# Patient Record
Sex: MALE | Race: WHITE | Employment: OTHER | URBAN - METROPOLITAN AREA
[De-identification: names, ages, dates, MRNs, and addresses within clinical notes are randomized per-mention and may not be internally consistent; named-entity substitution may affect disease eponyms.]

---

## 2022-11-11 NOTE — PROGRESS NOTES
Patient awake, alert, and oriented.  No complaints of pain; abdomen soft and tender.  Patient passing flatus without difficulty.  Vital signs stable.  Patient tolerated po fluids well.  Dr. Bardales spoke with patient and family member prior to discharge.  Patient and family verbalize understanding of all discharge instructions given.  Patient discharged to home accompanied by family   Place patient label inside box (if no patient label, complete below)  Name:  :    MR#:   Erin Calender / PROCEDURE  I (we), Micheal Morales (Patient Name) authorize Kamille Hadley MD (Provider / Dawn High) and/or such assistants as may be selected by him/her, to perform the following operation/procedure(s): T5-T9 DECOMPRESSION WITH RESECTION OF EPIDURAL LIPOMATOSIS WITH L2-L5 DECOMPRESSION WITH RESECTION OF EPIDURAL LIPOMATOSIS       Note: If unable to obtain consent prior to an emergent procedure, document the emergent reason in the medical record. This procedure has been explained to my (our) satisfaction and included in the explanation was: The intended benefit, nature, and extent of the procedure to be performed; The significant risks involved and the probability of success; Alternative procedures and methods of treatment; The dangers and probable consequences of such alternatives (including no procedure or treatment); The expected consequences of the procedure on my future health; Whether other qualified individuals would be performing important surgical tasks and/or whether  would be present to advise or support the procedure. I (we) understand that there are other risks of infection and other serious complications in the pre-operative/procedural and postoperative/procedural stages of my (our) care. I (we) have asked all of the questions which I (we) thought were important in deciding whether or not to undergo treatment or diagnosis. These questions have been answered to my (our) satisfaction. I (we) understand that no assurance can be given that the procedure will be a success, and no guarantee or warranty of success has been given to me (us).     It has been explained to me (us) that during the course of the operation/procedure, unforeseen conditions may be revealed that necessitate extension of the original procedure(s) or different procedure(s) than those set forth in Paragraph 1. I (we) authorize and request that the above-named physician, his/her assistants or his/her designees, perform procedures as necessary and desirable if deemed to be in my (our) best interest.     Revised 8/2/2021                                                                          Page 1 of 2       I acknowledge that health care personnel may be observing this procedure for the purpose of medical education or other specified purposes as may be necessary as requested and/or approved by my (our) physician. I (we) consent to the disposal by the hospital Pathologist of the removed tissue, parts or organs in accordance with hospital policy. I do ____ do not ____ consent to the use of a local infiltration pain blocking agent that will be used by my provider/surgical provider to help alleviate pain during my procedure. I do ____ do not ____ consent to an emergent blood transfusion in the case of a life-threatening situation that requires blood components to be administered. This consent is valid for 24 hours from the beginning of the procedure. This patient does ____ or does not ____ currently have a DNR status/order. If DNR order is in place, obtain Addendum to the Surgical Consent for ALL Patients with a DNR Order to address evie-operative status for limited intervention or DNR suspension.      I have read and fully understand the above Consent for Operation/Procedure and that all blanks were completed before I signed the consent.   _____________________________       _____________________      ____/____am/pm  Signature of Patient or legal representative      Printed Name / Relationship            Date / Time   ____________________________       _____________________      ____/____am/pm  Witness to Signature                                    Printed Name                    Date / Time    If patient is unable to sign or is a minor, complete the following)  Patient is a minor, ____ years of age, or unable to sign because:   ______________________________________________________________________________________________    If a phone consent is obtained, consent will be documented by using two health care professionals, each affirming that the consenting party has no questions and gives consent for the procedure discussed with the physician/provider.   _____________________          ____________________       _____/_____am/pm   2nd witness to phone consent        Printed name           Date / Time    Informed Consent:  I have provided the explanation described above in section 1 to the patient and/or legal representative.  I have provided the patient and/or legal representative with an opportunity to ask any questions about the proposed operation/procedure.   ___________________________          ____________________         ____/____am/pm  Provider / Proceduralist                            Printed name            Date / Time  Revised 8/2/2021                                                                      Page 2 of 2

## 2022-11-14 ENCOUNTER — ANESTHESIA EVENT (OUTPATIENT)
Dept: OPERATING ROOM | Age: 67
DRG: 519 | End: 2022-11-14
Payer: MEDICARE

## 2022-11-14 RX ORDER — DIMENHYDRINATE 50 MG
TABLET ORAL
Status: ON HOLD | COMMUNITY
End: 2022-11-21 | Stop reason: HOSPADM

## 2022-11-14 RX ORDER — CITALOPRAM 40 MG/1
40 TABLET ORAL DAILY
COMMUNITY

## 2022-11-14 RX ORDER — BISOPROLOL FUMARATE 10 MG/1
10 TABLET, FILM COATED ORAL NIGHTLY
Status: ON HOLD | COMMUNITY
End: 2022-11-24 | Stop reason: HOSPADM

## 2022-11-14 RX ORDER — ASPIRIN 81 MG/1
81 TABLET, CHEWABLE ORAL DAILY
Status: ON HOLD | COMMUNITY
End: 2022-11-21 | Stop reason: HOSPADM

## 2022-11-14 RX ORDER — LOSARTAN POTASSIUM AND HYDROCHLOROTHIAZIDE 25; 100 MG/1; MG/1
1 TABLET ORAL DAILY
Status: ON HOLD | COMMUNITY
End: 2022-11-24 | Stop reason: HOSPADM

## 2022-11-14 RX ORDER — EZETIMIBE 10 MG/1
10 TABLET ORAL DAILY
Status: ON HOLD | COMMUNITY
End: 2022-11-24 | Stop reason: HOSPADM

## 2022-11-14 RX ORDER — ASCORBIC ACID 500 MG
1 TABLET ORAL DAILY
Status: ON HOLD | COMMUNITY
Start: 2010-12-07 | End: 2022-11-21 | Stop reason: HOSPADM

## 2022-11-14 RX ORDER — M-VIT,TX,IRON,MINS/CALC/FOLIC 27MG-0.4MG
1 TABLET ORAL DAILY
Status: ON HOLD | COMMUNITY
End: 2022-11-21 | Stop reason: HOSPADM

## 2022-11-14 RX ORDER — OMEPRAZOLE 20 MG/1
20 CAPSULE, DELAYED RELEASE ORAL DAILY
COMMUNITY

## 2022-11-14 RX ORDER — ROSUVASTATIN CALCIUM 10 MG/1
10 TABLET, COATED ORAL DAILY
COMMUNITY

## 2022-11-14 NOTE — PROGRESS NOTES
[facility] PRE-SURGICAL TESTING INSTRUCTIONS                      PRIOR TO PROCEDURE DATE:    1. PLEASE FOLLOW ANY INSTRUCTIONS GIVEN TO YOU PER YOUR SURGEON. 2. Arrange for someone to drive you home and be with you for the first 24 hours after discharge for your safety after your procedure for which you received sedation. Ensure it is someone we can share information with regarding your discharge. NOTE: At this time ONLY 2 ADULTS may accompany you   One person MUST stay at hospital entire time if outpatient surgery      3. You must contact your surgeon for instructions IF:  You are taking any blood thinners, aspirin, anti-inflammatory or vitamins. There is a change in your physical condition such as a cold, fever, rash, cuts, sores, or any other infection, especially near your surgical site. 4. Do not drink alcohol the day before or day of your procedure. Do not use any recreational marijuana at least 24 hours or street drugs (heroin, cocaine) at minimum 5 days prior to your procedure. 5. A Pre-Surgical History and Physical MUST be completed WITHIN 30 DAYS OR LESS prior to your procedure. by your Physician or an Urgent Care        THE DAY OF YOUR PROCEDURE:  1. Follow instructions for ARRIVAL TIME as DIRECTED BY YOUR SURGEON. 2. Enter the MAIN entrance from [address] and follow the signs to the free Parking [employer] or [employer] (offered free of charge 7 am-5pm). 3. Enter the Main Entrance of the hospital (do not enter from the lower level of the parking garage). Upon entrance, check in with the  at the surgical information desk on your LEFT. Bring your insurance card and photo ID to register      4. DO NOT EAT ANYTHING 8 hours prior to arrival for surgery. You may have up to 8 ounces of water 4 hours prior to your arrival for surgery.    NOTE: ALL Gastric, Bariatric & Bowel surgery patients - you MUST follow your surgeon's instructions regarding eating/ drinking as you will have very specific instructions to follow. If you did not receive these, call your surgeon's office immediately. 5. MEDICATIONS:  Take the following medications with a SMALL sip of water: CELEXA  (BASAL RATE INSULIN PUMP) OMEPRAZOLE  Use your usual dose of inhalers the morning of surgery. BRING your rescue inhaler with you to hospital.   Anesthesia does NOT want you to take insulin the morning of surgery. They will control your blood sugar while you are at the hospital. Please contact your ordering physician for instructions regarding your insulin the night before your procedure. If you have an insulin pump, please keep it set on basal rate. Bariatric patient's call your surgeon if on diabetic medications as some may need to be stopped 1 week prior to surgery    6. Do not swallow additional water when brushing teeth. No gum, candy, mints, or ice chips. Refrain from smoking or at least decrease the amount on day of surgery. 7. Morning of surgery:   Take a shower with an antibacterial soap (i.e., Safeguard or Dial) OR your physician may have instructed you to use Hibiclens. Dress in loose, comfortable clothing appropriate for redressing after your procedure. Do not wear jewelry (including body piercings), make-up (especially NO eye make-up), fingernail polish (NO toenail polish if foot/leg surgery), lotion, powders, or metal hairclips. Do not shave or wax for 72 hours prior to procedure near your operative site. Shaving with a razor can irritate your skin and make it easier to develop an infection. On the day of your procedure, any hair that needs to be removed near the surgical site will be 'clipped' by a healthcare worker using a special clipper designed to avoid skin irritation. 8. Dentures, glasses, or contacts will need to be removed before your procedure. Bring cases for your glasses, contacts, dentures, or hearing aids to protect them while you are in surgery.       9. If you use a CPAP, please bring it with you on the day of your procedure. 10. We recommend that valuable personal belongings such as cash, cell phones, e-tablets, or jewelry, be left at home during your stay. The hospital will not be responsible for valuables that are not secured in the hospital safe. However, if your insurance requires a co-pay, you may want to bring a method of payment, i.e., Check or credit card, if you wish to pay your co-pay the day of surgery. 11. If you are to stay overnight, you may bring a bag with personal items. Please have any large items you may need brought in by your family after your arrival to your hospital room. 12. If you have a Living Will or Durable Power of , please bring a copy on the day of your procedure. How we keep you safe and work to prevent surgical site infections:   1. Health care workers should always check your ID bracelet to verify your name and birth date. You will be asked many times to state your name, date of birth, and allergies. 2. Health care workers should always clean their hands with soap or alcohol gel before providing care to you. It is okay to ask anyone if they cleaned their hands before they touch you. 3. You will be actively involved in verifying the type of procedure you are having and ensuring the correct surgical site. This will be confirmed multiple times prior to your procedure. Do NOT fatou your surgery site UNLESS instructed to by your surgeon. 4. When you are in the operating room, your surgical site will be cleansed with a special soap, and in most cases, you will be given an antibiotic before the surgery begins. What to expect AFTER your procedure? 1. Immediately following your procedure, your will be taken to the PACU for the first phase of your recovery.   Your nurse will help you recover from any potential side effects of anesthesia, such as extreme drowsiness, changes in your vital signs or breathing patterns. Nausea, headache, muscle aches, or sore throat may also occur after anesthesia. Your nurse will help you manage these potential side effects. 2. For comfort and safety, arrange to have someone at home with you for the first 24 hours after discharge. 3. You and your family will be given written instructions about your diet, activity, dressing care, medications, and return visits. 4. Once at home, should issues with nausea, pain, or bleeding occur, or should you notice any signs of infection, you should call your surgeon. 5. Always clean your hands before and after caring for your wound. Do not let your family touch your surgery site without cleaning their hands. 6. Narcotic pain medications can cause significant constipation. You may want to add a stool softener to your postoperative medication schedule or speak to your surgeon on how best to manage this SIDE EFFECT. SPECIAL INSTRUCTIONS BRING CPAP    Thank you for allowing us to care for you. We strive to exceed your expectations in the delivery of care and service provided to you and your family. If you need to contact the [place] [address] staff for any reason, please call us at [phone]    Instructions reviewed with patient during preadmission testing phone interview.   Mary Delacruz RN.11/14/2022 .9:15 AM      ADDITIONAL EDUCATIONAL INFORMATION REVIEWED PER PHONE WITH YOU AND/OR YOUR FAMILY:  Yes Hibiclens® Bathing Instructions   Yes Antibacterial Soap

## 2022-11-15 ENCOUNTER — HOSPITAL ENCOUNTER (INPATIENT)
Age: 67
LOS: 6 days | Discharge: NURSING, REHAB OR LONG-TERM CARE | DRG: 519 | End: 2022-11-21
Attending: NEUROLOGICAL SURGERY | Admitting: NEUROLOGICAL SURGERY
Payer: MEDICARE

## 2022-11-15 ENCOUNTER — APPOINTMENT (OUTPATIENT)
Dept: GENERAL RADIOLOGY | Age: 67
DRG: 519 | End: 2022-11-15
Attending: NEUROLOGICAL SURGERY
Payer: MEDICARE

## 2022-11-15 ENCOUNTER — ANESTHESIA (OUTPATIENT)
Dept: OPERATING ROOM | Age: 67
DRG: 519 | End: 2022-11-15
Payer: MEDICARE

## 2022-11-15 DIAGNOSIS — M48.061 SPINAL STENOSIS OF LUMBAR REGION, UNSPECIFIED WHETHER NEUROGENIC CLAUDICATION PRESENT: ICD-10-CM

## 2022-11-15 DIAGNOSIS — M47.14 THORACIC MYELOPATHY: ICD-10-CM

## 2022-11-15 DIAGNOSIS — M48.04 THORACIC STENOSIS: ICD-10-CM

## 2022-11-15 PROBLEM — M48.062 LUMBAR STENOSIS WITH NEUROGENIC CLAUDICATION: Status: ACTIVE | Noted: 2022-11-15

## 2022-11-15 LAB
ABO/RH: NORMAL
ANTIBODY SCREEN: NORMAL
APTT: 28.1 SEC (ref 23–34.3)
EKG ATRIAL RATE: 61 BPM
EKG DIAGNOSIS: NORMAL
EKG P AXIS: 35 DEGREES
EKG P-R INTERVAL: 182 MS
EKG Q-T INTERVAL: 424 MS
EKG QRS DURATION: 90 MS
EKG QTC CALCULATION (BAZETT): 426 MS
EKG R AXIS: -19 DEGREES
EKG T AXIS: 14 DEGREES
EKG VENTRICULAR RATE: 61 BPM
GLUCOSE BLD-MCNC: 139 MG/DL (ref 70–99)
GLUCOSE BLD-MCNC: 156 MG/DL (ref 70–99)
GLUCOSE BLD-MCNC: 173 MG/DL (ref 70–99)
GLUCOSE BLD-MCNC: 194 MG/DL (ref 70–99)
GLUCOSE BLD-MCNC: 248 MG/DL (ref 70–99)
HCT VFR BLD CALC: 41.2 % (ref 40.5–52.5)
HEMOGLOBIN: 14.6 G/DL (ref 13.5–17.5)
INR BLD: 1.07 (ref 0.87–1.14)
MCH RBC QN AUTO: 33 PG (ref 26–34)
MCHC RBC AUTO-ENTMCNC: 35.5 G/DL (ref 31–36)
MCV RBC AUTO: 92.9 FL (ref 80–100)
PDW BLD-RTO: 13.4 % (ref 12.4–15.4)
PERFORMED ON: ABNORMAL
PLATELET # BLD: 174 K/UL (ref 135–450)
PMV BLD AUTO: 7.8 FL (ref 5–10.5)
PROTHROMBIN TIME: 13.8 SEC (ref 11.7–14.5)
RBC # BLD: 4.44 M/UL (ref 4.2–5.9)
WBC # BLD: 3.9 K/UL (ref 4–11)

## 2022-11-15 PROCEDURE — 6360000002 HC RX W HCPCS: Performed by: NEUROLOGICAL SURGERY

## 2022-11-15 PROCEDURE — 72020 X-RAY EXAM OF SPINE 1 VIEW: CPT

## 2022-11-15 PROCEDURE — 6360000002 HC RX W HCPCS: Performed by: PHYSICIAN ASSISTANT

## 2022-11-15 PROCEDURE — 01NB0ZZ RELEASE LUMBAR NERVE, OPEN APPROACH: ICD-10-PCS | Performed by: NEUROLOGICAL SURGERY

## 2022-11-15 PROCEDURE — 2500000003 HC RX 250 WO HCPCS: Performed by: NEUROLOGICAL SURGERY

## 2022-11-15 PROCEDURE — 3600000004 HC SURGERY LEVEL 4 BASE: Performed by: NEUROLOGICAL SURGERY

## 2022-11-15 PROCEDURE — 2580000003 HC RX 258: Performed by: NURSE PRACTITIONER

## 2022-11-15 PROCEDURE — 2580000003 HC RX 258: Performed by: PHYSICIAN ASSISTANT

## 2022-11-15 PROCEDURE — 51798 US URINE CAPACITY MEASURE: CPT

## 2022-11-15 PROCEDURE — 3700000000 HC ANESTHESIA ATTENDED CARE: Performed by: NEUROLOGICAL SURGERY

## 2022-11-15 PROCEDURE — 85730 THROMBOPLASTIN TIME PARTIAL: CPT

## 2022-11-15 PROCEDURE — 86901 BLOOD TYPING SEROLOGIC RH(D): CPT

## 2022-11-15 PROCEDURE — 7100000001 HC PACU RECOVERY - ADDTL 15 MIN: Performed by: NEUROLOGICAL SURGERY

## 2022-11-15 PROCEDURE — 86900 BLOOD TYPING SEROLOGIC ABO: CPT

## 2022-11-15 PROCEDURE — 1200000000 HC SEMI PRIVATE

## 2022-11-15 PROCEDURE — 93005 ELECTROCARDIOGRAM TRACING: CPT | Performed by: NEUROLOGICAL SURGERY

## 2022-11-15 PROCEDURE — 3209999900 FLUORO FOR SURGICAL PROCEDURES

## 2022-11-15 PROCEDURE — 00NY0ZZ RELEASE LUMBAR SPINAL CORD, OPEN APPROACH: ICD-10-PCS | Performed by: NEUROLOGICAL SURGERY

## 2022-11-15 PROCEDURE — 3600000014 HC SURGERY LEVEL 4 ADDTL 15MIN: Performed by: NEUROLOGICAL SURGERY

## 2022-11-15 PROCEDURE — 6360000002 HC RX W HCPCS: Performed by: ANESTHESIOLOGY

## 2022-11-15 PROCEDURE — 2500000003 HC RX 250 WO HCPCS: Performed by: NURSE ANESTHETIST, CERTIFIED REGISTERED

## 2022-11-15 PROCEDURE — 6370000000 HC RX 637 (ALT 250 FOR IP): Performed by: INTERNAL MEDICINE

## 2022-11-15 PROCEDURE — 6360000002 HC RX W HCPCS: Performed by: NURSE ANESTHETIST, CERTIFIED REGISTERED

## 2022-11-15 PROCEDURE — 2580000003 HC RX 258: Performed by: ANESTHESIOLOGY

## 2022-11-15 PROCEDURE — 86850 RBC ANTIBODY SCREEN: CPT

## 2022-11-15 PROCEDURE — 2580000003 HC RX 258: Performed by: NEUROLOGICAL SURGERY

## 2022-11-15 PROCEDURE — 6370000000 HC RX 637 (ALT 250 FOR IP): Performed by: PHYSICIAN ASSISTANT

## 2022-11-15 PROCEDURE — 6360000002 HC RX W HCPCS: Performed by: NURSE PRACTITIONER

## 2022-11-15 PROCEDURE — 7100000000 HC PACU RECOVERY - FIRST 15 MIN: Performed by: NEUROLOGICAL SURGERY

## 2022-11-15 PROCEDURE — 00NX0ZZ RELEASE THORACIC SPINAL CORD, OPEN APPROACH: ICD-10-PCS | Performed by: NEUROLOGICAL SURGERY

## 2022-11-15 PROCEDURE — 3700000001 HC ADD 15 MINUTES (ANESTHESIA): Performed by: NEUROLOGICAL SURGERY

## 2022-11-15 PROCEDURE — 85610 PROTHROMBIN TIME: CPT

## 2022-11-15 PROCEDURE — 88304 TISSUE EXAM BY PATHOLOGIST: CPT

## 2022-11-15 PROCEDURE — 2720000010 HC SURG SUPPLY STERILE: Performed by: NEUROLOGICAL SURGERY

## 2022-11-15 PROCEDURE — 2709999900 HC NON-CHARGEABLE SUPPLY: Performed by: NEUROLOGICAL SURGERY

## 2022-11-15 PROCEDURE — C9290 INJ, BUPIVACAINE LIPOSOME: HCPCS | Performed by: NEUROLOGICAL SURGERY

## 2022-11-15 PROCEDURE — A4217 STERILE WATER/SALINE, 500 ML: HCPCS | Performed by: NEUROLOGICAL SURGERY

## 2022-11-15 PROCEDURE — 85027 COMPLETE CBC AUTOMATED: CPT

## 2022-11-15 PROCEDURE — 6370000000 HC RX 637 (ALT 250 FOR IP): Performed by: NURSE PRACTITIONER

## 2022-11-15 PROCEDURE — 93010 ELECTROCARDIOGRAM REPORT: CPT | Performed by: INTERNAL MEDICINE

## 2022-11-15 PROCEDURE — 2580000003 HC RX 258: Performed by: NURSE ANESTHETIST, CERTIFIED REGISTERED

## 2022-11-15 RX ORDER — SODIUM CHLORIDE 0.9 % (FLUSH) 0.9 %
5-40 SYRINGE (ML) INJECTION EVERY 12 HOURS SCHEDULED
Status: DISCONTINUED | OUTPATIENT
Start: 2022-11-15 | End: 2022-11-21 | Stop reason: HOSPADM

## 2022-11-15 RX ORDER — SODIUM CHLORIDE, SODIUM LACTATE, POTASSIUM CHLORIDE, CALCIUM CHLORIDE 600; 310; 30; 20 MG/100ML; MG/100ML; MG/100ML; MG/100ML
INJECTION, SOLUTION INTRAVENOUS CONTINUOUS
Status: DISCONTINUED | OUTPATIENT
Start: 2022-11-15 | End: 2022-11-15 | Stop reason: HOSPADM

## 2022-11-15 RX ORDER — LIDOCAINE HYDROCHLORIDE AND EPINEPHRINE 10; 10 MG/ML; UG/ML
INJECTION, SOLUTION INFILTRATION; PERINEURAL PRN
Status: DISCONTINUED | OUTPATIENT
Start: 2022-11-15 | End: 2022-11-15 | Stop reason: HOSPADM

## 2022-11-15 RX ORDER — PROPOFOL 10 MG/ML
INJECTION, EMULSION INTRAVENOUS CONTINUOUS PRN
Status: DISCONTINUED | OUTPATIENT
Start: 2022-11-15 | End: 2022-11-15 | Stop reason: SDUPTHER

## 2022-11-15 RX ORDER — SENNA AND DOCUSATE SODIUM 50; 8.6 MG/1; MG/1
1 TABLET, FILM COATED ORAL 2 TIMES DAILY
Status: DISCONTINUED | OUTPATIENT
Start: 2022-11-15 | End: 2022-11-21 | Stop reason: HOSPADM

## 2022-11-15 RX ORDER — POLYETHYLENE GLYCOL 3350 17 G/17G
17 POWDER, FOR SOLUTION ORAL DAILY
Status: DISCONTINUED | OUTPATIENT
Start: 2022-11-15 | End: 2022-11-21 | Stop reason: HOSPADM

## 2022-11-15 RX ORDER — SODIUM CHLORIDE 0.9 % (FLUSH) 0.9 %
5-40 SYRINGE (ML) INJECTION EVERY 12 HOURS SCHEDULED
Status: DISCONTINUED | OUTPATIENT
Start: 2022-11-15 | End: 2022-11-15 | Stop reason: HOSPADM

## 2022-11-15 RX ORDER — DIPHENHYDRAMINE HYDROCHLORIDE 50 MG/ML
12.5 INJECTION INTRAMUSCULAR; INTRAVENOUS
Status: DISCONTINUED | OUTPATIENT
Start: 2022-11-15 | End: 2022-11-15 | Stop reason: HOSPADM

## 2022-11-15 RX ORDER — ENOXAPARIN SODIUM 100 MG/ML
40 INJECTION SUBCUTANEOUS DAILY
Status: DISCONTINUED | OUTPATIENT
Start: 2022-11-16 | End: 2022-11-21 | Stop reason: HOSPADM

## 2022-11-15 RX ORDER — CLINDAMYCIN PHOSPHATE 900 MG/50ML
900 INJECTION INTRAVENOUS ONCE
Status: COMPLETED | OUTPATIENT
Start: 2022-11-15 | End: 2022-11-15

## 2022-11-15 RX ORDER — EZETIMIBE 10 MG/1
10 TABLET ORAL DAILY
Status: DISCONTINUED | OUTPATIENT
Start: 2022-11-15 | End: 2022-11-21 | Stop reason: HOSPADM

## 2022-11-15 RX ORDER — SODIUM CHLORIDE 0.9 % (FLUSH) 0.9 %
5-40 SYRINGE (ML) INJECTION PRN
Status: DISCONTINUED | OUTPATIENT
Start: 2022-11-15 | End: 2022-11-21 | Stop reason: HOSPADM

## 2022-11-15 RX ORDER — POLYETHYLENE GLYCOL 3350 17 G/17G
17 POWDER, FOR SOLUTION ORAL DAILY PRN
Status: DISCONTINUED | OUTPATIENT
Start: 2022-11-15 | End: 2022-11-21 | Stop reason: HOSPADM

## 2022-11-15 RX ORDER — FENTANYL CITRATE 50 UG/ML
INJECTION, SOLUTION INTRAMUSCULAR; INTRAVENOUS PRN
Status: DISCONTINUED | OUTPATIENT
Start: 2022-11-15 | End: 2022-11-15 | Stop reason: SDUPTHER

## 2022-11-15 RX ORDER — GLYCOPYRROLATE 0.2 MG/ML
INJECTION INTRAMUSCULAR; INTRAVENOUS PRN
Status: DISCONTINUED | OUTPATIENT
Start: 2022-11-15 | End: 2022-11-15 | Stop reason: SDUPTHER

## 2022-11-15 RX ORDER — METHOCARBAMOL 750 MG/1
750 TABLET, FILM COATED ORAL 4 TIMES DAILY PRN
Status: DISCONTINUED | OUTPATIENT
Start: 2022-11-16 | End: 2022-11-15

## 2022-11-15 RX ORDER — ONDANSETRON 4 MG/1
4 TABLET, ORALLY DISINTEGRATING ORAL EVERY 8 HOURS PRN
Status: DISCONTINUED | OUTPATIENT
Start: 2022-11-15 | End: 2022-11-21 | Stop reason: HOSPADM

## 2022-11-15 RX ORDER — EPHEDRINE SULFATE 50 MG/ML
INJECTION INTRAVENOUS PRN
Status: DISCONTINUED | OUTPATIENT
Start: 2022-11-15 | End: 2022-11-15 | Stop reason: SDUPTHER

## 2022-11-15 RX ORDER — DIAZEPAM 5 MG/1
5 TABLET ORAL EVERY 6 HOURS PRN
Status: DISCONTINUED | OUTPATIENT
Start: 2022-11-15 | End: 2022-11-21 | Stop reason: HOSPADM

## 2022-11-15 RX ORDER — METHOCARBAMOL 750 MG/1
750 TABLET, FILM COATED ORAL EVERY 6 HOURS
Status: DISCONTINUED | OUTPATIENT
Start: 2022-11-16 | End: 2022-11-17

## 2022-11-15 RX ORDER — SODIUM CHLORIDE, SODIUM LACTATE, POTASSIUM CHLORIDE, CALCIUM CHLORIDE 600; 310; 30; 20 MG/100ML; MG/100ML; MG/100ML; MG/100ML
INJECTION, SOLUTION INTRAVENOUS CONTINUOUS PRN
Status: DISCONTINUED | OUTPATIENT
Start: 2022-11-15 | End: 2022-11-15 | Stop reason: SDUPTHER

## 2022-11-15 RX ORDER — SUCCINYLCHOLINE/SOD CL,ISO/PF 200MG/10ML
SYRINGE (ML) INTRAVENOUS PRN
Status: DISCONTINUED | OUTPATIENT
Start: 2022-11-15 | End: 2022-11-15 | Stop reason: SDUPTHER

## 2022-11-15 RX ORDER — ROSUVASTATIN CALCIUM 10 MG/1
10 TABLET, COATED ORAL DAILY
Status: DISCONTINUED | OUTPATIENT
Start: 2022-11-15 | End: 2022-11-21 | Stop reason: HOSPADM

## 2022-11-15 RX ORDER — CITALOPRAM 40 MG/1
40 TABLET ORAL DAILY
Status: DISCONTINUED | OUTPATIENT
Start: 2022-11-15 | End: 2022-11-21 | Stop reason: HOSPADM

## 2022-11-15 RX ORDER — ONDANSETRON 2 MG/ML
INJECTION INTRAMUSCULAR; INTRAVENOUS PRN
Status: DISCONTINUED | OUTPATIENT
Start: 2022-11-15 | End: 2022-11-15 | Stop reason: SDUPTHER

## 2022-11-15 RX ORDER — DEXTROSE MONOHYDRATE 100 MG/ML
INJECTION, SOLUTION INTRAVENOUS CONTINUOUS PRN
Status: DISCONTINUED | OUTPATIENT
Start: 2022-11-15 | End: 2022-11-21 | Stop reason: HOSPADM

## 2022-11-15 RX ORDER — ONDANSETRON 2 MG/ML
4 INJECTION INTRAMUSCULAR; INTRAVENOUS EVERY 6 HOURS PRN
Status: DISCONTINUED | OUTPATIENT
Start: 2022-11-15 | End: 2022-11-21 | Stop reason: HOSPADM

## 2022-11-15 RX ORDER — METOPROLOL SUCCINATE 100 MG/1
100 TABLET, EXTENDED RELEASE ORAL DAILY
Status: DISCONTINUED | OUTPATIENT
Start: 2022-11-15 | End: 2022-11-21 | Stop reason: HOSPADM

## 2022-11-15 RX ORDER — SODIUM CHLORIDE 9 MG/ML
INJECTION, SOLUTION INTRAVENOUS PRN
Status: DISCONTINUED | OUTPATIENT
Start: 2022-11-15 | End: 2022-11-15 | Stop reason: HOSPADM

## 2022-11-15 RX ORDER — MEPERIDINE HYDROCHLORIDE 25 MG/ML
12.5 INJECTION INTRAMUSCULAR; INTRAVENOUS; SUBCUTANEOUS AS NEEDED
Status: DISCONTINUED | OUTPATIENT
Start: 2022-11-15 | End: 2022-11-15 | Stop reason: HOSPADM

## 2022-11-15 RX ORDER — SODIUM CHLORIDE 0.9 % (FLUSH) 0.9 %
5-40 SYRINGE (ML) INJECTION PRN
Status: DISCONTINUED | OUTPATIENT
Start: 2022-11-15 | End: 2022-11-15 | Stop reason: HOSPADM

## 2022-11-15 RX ORDER — METHOCARBAMOL 750 MG/1
750 TABLET, FILM COATED ORAL 4 TIMES DAILY
Status: DISCONTINUED | OUTPATIENT
Start: 2022-11-16 | End: 2022-11-15

## 2022-11-15 RX ORDER — LOSARTAN POTASSIUM AND HYDROCHLOROTHIAZIDE 25; 100 MG/1; MG/1
1 TABLET ORAL DAILY
Status: DISCONTINUED | OUTPATIENT
Start: 2022-11-16 | End: 2022-11-21 | Stop reason: HOSPADM

## 2022-11-15 RX ORDER — PROCHLORPERAZINE EDISYLATE 5 MG/ML
5 INJECTION INTRAMUSCULAR; INTRAVENOUS
Status: DISCONTINUED | OUTPATIENT
Start: 2022-11-15 | End: 2022-11-15 | Stop reason: HOSPADM

## 2022-11-15 RX ORDER — SODIUM CHLORIDE, SODIUM LACTATE, POTASSIUM CHLORIDE, CALCIUM CHLORIDE 600; 310; 30; 20 MG/100ML; MG/100ML; MG/100ML; MG/100ML
INJECTION, SOLUTION INTRAVENOUS CONTINUOUS PRN
Status: DISCONTINUED | OUTPATIENT
Start: 2022-11-15 | End: 2022-11-15

## 2022-11-15 RX ORDER — REMIFENTANIL HYDROCHLORIDE 1 MG/ML
INJECTION, POWDER, LYOPHILIZED, FOR SOLUTION INTRAVENOUS CONTINUOUS PRN
Status: DISCONTINUED | OUTPATIENT
Start: 2022-11-15 | End: 2022-11-15 | Stop reason: SDUPTHER

## 2022-11-15 RX ORDER — OXYCODONE HYDROCHLORIDE 5 MG/1
5 TABLET ORAL EVERY 4 HOURS PRN
Status: DISCONTINUED | OUTPATIENT
Start: 2022-11-15 | End: 2022-11-21 | Stop reason: HOSPADM

## 2022-11-15 RX ORDER — CALCIUM CARBONATE 200(500)MG
1000 TABLET,CHEWABLE ORAL 3 TIMES DAILY PRN
Status: DISCONTINUED | OUTPATIENT
Start: 2022-11-15 | End: 2022-11-21 | Stop reason: HOSPADM

## 2022-11-15 RX ORDER — PANTOPRAZOLE SODIUM 40 MG/1
40 TABLET, DELAYED RELEASE ORAL
Status: DISCONTINUED | OUTPATIENT
Start: 2022-11-16 | End: 2022-11-17

## 2022-11-15 RX ORDER — PROPOFOL 10 MG/ML
INJECTION, EMULSION INTRAVENOUS PRN
Status: DISCONTINUED | OUTPATIENT
Start: 2022-11-15 | End: 2022-11-15 | Stop reason: SDUPTHER

## 2022-11-15 RX ORDER — SODIUM CHLORIDE, SODIUM LACTATE, POTASSIUM CHLORIDE, CALCIUM CHLORIDE 600; 310; 30; 20 MG/100ML; MG/100ML; MG/100ML; MG/100ML
INJECTION, SOLUTION INTRAVENOUS CONTINUOUS
Status: DISCONTINUED | OUTPATIENT
Start: 2022-11-15 | End: 2022-11-16

## 2022-11-15 RX ORDER — ONDANSETRON 2 MG/ML
4 INJECTION INTRAMUSCULAR; INTRAVENOUS
Status: DISCONTINUED | OUTPATIENT
Start: 2022-11-15 | End: 2022-11-15 | Stop reason: HOSPADM

## 2022-11-15 RX ORDER — FAMOTIDINE 20 MG/1
20 TABLET, FILM COATED ORAL 2 TIMES DAILY
Status: DISCONTINUED | OUTPATIENT
Start: 2022-11-15 | End: 2022-11-17

## 2022-11-15 RX ORDER — MIDAZOLAM HYDROCHLORIDE 1 MG/ML
INJECTION INTRAMUSCULAR; INTRAVENOUS PRN
Status: DISCONTINUED | OUTPATIENT
Start: 2022-11-15 | End: 2022-11-15 | Stop reason: SDUPTHER

## 2022-11-15 RX ORDER — LIDOCAINE HYDROCHLORIDE 20 MG/ML
INJECTION, SOLUTION INTRAVENOUS PRN
Status: DISCONTINUED | OUTPATIENT
Start: 2022-11-15 | End: 2022-11-15 | Stop reason: SDUPTHER

## 2022-11-15 RX ORDER — MAGNESIUM HYDROXIDE 1200 MG/15ML
LIQUID ORAL CONTINUOUS PRN
Status: DISCONTINUED | OUTPATIENT
Start: 2022-11-15 | End: 2022-11-15 | Stop reason: HOSPADM

## 2022-11-15 RX ORDER — OXYCODONE HYDROCHLORIDE 5 MG/1
10 TABLET ORAL EVERY 4 HOURS PRN
Status: DISCONTINUED | OUTPATIENT
Start: 2022-11-15 | End: 2022-11-21 | Stop reason: HOSPADM

## 2022-11-15 RX ORDER — SODIUM CHLORIDE 9 MG/ML
INJECTION, SOLUTION INTRAVENOUS PRN
Status: DISCONTINUED | OUTPATIENT
Start: 2022-11-15 | End: 2022-11-21 | Stop reason: HOSPADM

## 2022-11-15 RX ORDER — HYDRALAZINE HYDROCHLORIDE 20 MG/ML
10 INJECTION INTRAMUSCULAR; INTRAVENOUS
Status: DISCONTINUED | OUTPATIENT
Start: 2022-11-15 | End: 2022-11-15 | Stop reason: HOSPADM

## 2022-11-15 RX ORDER — DEXTROSE MONOHYDRATE 100 MG/ML
INJECTION, SOLUTION INTRAVENOUS CONTINUOUS PRN
Status: DISCONTINUED | OUTPATIENT
Start: 2022-11-15 | End: 2022-11-15 | Stop reason: SDUPTHER

## 2022-11-15 RX ORDER — SODIUM PHOSPHATE, DIBASIC AND SODIUM PHOSPHATE, MONOBASIC 7; 19 G/133ML; G/133ML
1 ENEMA RECTAL DAILY PRN
Status: DISCONTINUED | OUTPATIENT
Start: 2022-11-15 | End: 2022-11-21 | Stop reason: HOSPADM

## 2022-11-15 RX ORDER — ROCURONIUM BROMIDE 10 MG/ML
INJECTION, SOLUTION INTRAVENOUS PRN
Status: DISCONTINUED | OUTPATIENT
Start: 2022-11-15 | End: 2022-11-15 | Stop reason: SDUPTHER

## 2022-11-15 RX ADMIN — SENNOSIDES AND DOCUSATE SODIUM 1 TABLET: 50; 8.6 TABLET ORAL at 20:09

## 2022-11-15 RX ADMIN — PHENYLEPHRINE HYDROCHLORIDE 100 MCG: 10 INJECTION, SOLUTION INTRAMUSCULAR; INTRAVENOUS; SUBCUTANEOUS at 11:13

## 2022-11-15 RX ADMIN — ROCURONIUM BROMIDE 45 MG: 10 INJECTION INTRAVENOUS at 09:17

## 2022-11-15 RX ADMIN — FENTANYL CITRATE 50 MCG: 50 INJECTION, SOLUTION INTRAMUSCULAR; INTRAVENOUS at 11:59

## 2022-11-15 RX ADMIN — ONDANSETRON 4 MG: 2 INJECTION INTRAMUSCULAR; INTRAVENOUS at 11:35

## 2022-11-15 RX ADMIN — FAMOTIDINE 20 MG: 20 TABLET ORAL at 20:09

## 2022-11-15 RX ADMIN — OXYCODONE 10 MG: 5 TABLET ORAL at 18:22

## 2022-11-15 RX ADMIN — PROPOFOL 120 MCG/KG/MIN: 10 INJECTION, EMULSION INTRAVENOUS at 09:17

## 2022-11-15 RX ADMIN — SUGAMMADEX 200 MG: 100 INJECTION, SOLUTION INTRAVENOUS at 10:02

## 2022-11-15 RX ADMIN — CALCIUM CARBONATE 1000 MG: 500 TABLET, CHEWABLE ORAL at 22:30

## 2022-11-15 RX ADMIN — LIDOCAINE HYDROCHLORIDE 100 MG: 20 INJECTION, SOLUTION INTRAVENOUS at 08:55

## 2022-11-15 RX ADMIN — REMIFENTANIL HYDROCHLORIDE 0.1 MCG/KG/MIN: 1 INJECTION, POWDER, LYOPHILIZED, FOR SOLUTION INTRAVENOUS at 09:17

## 2022-11-15 RX ADMIN — METHOCARBAMOL 1000 MG: 100 INJECTION, SOLUTION INTRAMUSCULAR; INTRAVENOUS at 20:09

## 2022-11-15 RX ADMIN — OXYCODONE 10 MG: 5 TABLET ORAL at 14:19

## 2022-11-15 RX ADMIN — HYDROMORPHONE HYDROCHLORIDE 0.5 MG: 1 INJECTION, SOLUTION INTRAMUSCULAR; INTRAVENOUS; SUBCUTANEOUS at 19:19

## 2022-11-15 RX ADMIN — ROSUVASTATIN CALCIUM 10 MG: 10 TABLET, COATED ORAL at 14:19

## 2022-11-15 RX ADMIN — PHENYLEPHRINE HYDROCHLORIDE 100 MCG: 10 INJECTION, SOLUTION INTRAMUSCULAR; INTRAVENOUS; SUBCUTANEOUS at 11:33

## 2022-11-15 RX ADMIN — HYDROMORPHONE HYDROCHLORIDE 0.5 MG: 1 INJECTION, SOLUTION INTRAMUSCULAR; INTRAVENOUS; SUBCUTANEOUS at 12:22

## 2022-11-15 RX ADMIN — DIAZEPAM 5 MG: 5 TABLET ORAL at 15:05

## 2022-11-15 RX ADMIN — DIAZEPAM 5 MG: 5 TABLET ORAL at 22:30

## 2022-11-15 RX ADMIN — DEXMEDETOMIDINE HYDROCHLORIDE 4 MCG: 100 INJECTION, SOLUTION INTRAVENOUS at 09:00

## 2022-11-15 RX ADMIN — VANCOMYCIN HYDROCHLORIDE 1500 MG: 10 INJECTION, POWDER, LYOPHILIZED, FOR SOLUTION INTRAVENOUS at 09:00

## 2022-11-15 RX ADMIN — METHOCARBAMOL 1000 MG: 100 INJECTION, SOLUTION INTRAMUSCULAR; INTRAVENOUS at 17:18

## 2022-11-15 RX ADMIN — GLYCOPYRROLATE 0.2 MG: 0.2 INJECTION INTRAMUSCULAR; INTRAVENOUS at 09:02

## 2022-11-15 RX ADMIN — METOPROLOL SUCCINATE 100 MG: 100 TABLET, EXTENDED RELEASE ORAL at 14:18

## 2022-11-15 RX ADMIN — MIDAZOLAM HYDROCHLORIDE 2 MG: 2 INJECTION, SOLUTION INTRAMUSCULAR; INTRAVENOUS at 08:47

## 2022-11-15 RX ADMIN — DEXMEDETOMIDINE HYDROCHLORIDE 4 MCG: 100 INJECTION, SOLUTION INTRAVENOUS at 08:51

## 2022-11-15 RX ADMIN — POLYETHYLENE GLYCOL 3350 17 G: 17 POWDER, FOR SOLUTION ORAL at 14:18

## 2022-11-15 RX ADMIN — HYDROMORPHONE HYDROCHLORIDE 0.5 MG: 1 INJECTION, SOLUTION INTRAMUSCULAR; INTRAVENOUS; SUBCUTANEOUS at 23:56

## 2022-11-15 RX ADMIN — SODIUM CHLORIDE, POTASSIUM CHLORIDE, SODIUM LACTATE AND CALCIUM CHLORIDE: 600; 310; 30; 20 INJECTION, SOLUTION INTRAVENOUS at 07:34

## 2022-11-15 RX ADMIN — OXYCODONE 10 MG: 5 TABLET ORAL at 22:30

## 2022-11-15 RX ADMIN — PHENYLEPHRINE HYDROCHLORIDE 100 MCG: 10 INJECTION, SOLUTION INTRAMUSCULAR; INTRAVENOUS; SUBCUTANEOUS at 10:57

## 2022-11-15 RX ADMIN — SODIUM CHLORIDE, POTASSIUM CHLORIDE, SODIUM LACTATE AND CALCIUM CHLORIDE: 600; 310; 30; 20 INJECTION, SOLUTION INTRAVENOUS at 15:00

## 2022-11-15 RX ADMIN — ROCURONIUM BROMIDE 5 MG: 10 INJECTION INTRAVENOUS at 08:55

## 2022-11-15 RX ADMIN — PHENOL 1 SPRAY: 1.5 LIQUID ORAL at 22:30

## 2022-11-15 RX ADMIN — Medication 160 MG: at 08:56

## 2022-11-15 RX ADMIN — HYDROMORPHONE HYDROCHLORIDE 0.5 MG: 1 INJECTION, SOLUTION INTRAMUSCULAR; INTRAVENOUS; SUBCUTANEOUS at 12:43

## 2022-11-15 RX ADMIN — SODIUM CHLORIDE, SODIUM LACTATE, POTASSIUM CHLORIDE, AND CALCIUM CHLORIDE: .6; .31; .03; .02 INJECTION, SOLUTION INTRAVENOUS at 08:52

## 2022-11-15 RX ADMIN — VANCOMYCIN HYDROCHLORIDE 1500 MG: 10 INJECTION, POWDER, LYOPHILIZED, FOR SOLUTION INTRAVENOUS at 20:17

## 2022-11-15 RX ADMIN — SODIUM CHLORIDE, SODIUM LACTATE, POTASSIUM CHLORIDE, AND CALCIUM CHLORIDE: .6; .31; .03; .02 INJECTION, SOLUTION INTRAVENOUS at 09:47

## 2022-11-15 RX ADMIN — SENNOSIDES AND DOCUSATE SODIUM 1 TABLET: 50; 8.6 TABLET ORAL at 14:19

## 2022-11-15 RX ADMIN — EZETIMIBE 10 MG: 10 TABLET ORAL at 15:38

## 2022-11-15 RX ADMIN — PROPOFOL 150 MG: 10 INJECTION, EMULSION INTRAVENOUS at 08:55

## 2022-11-15 RX ADMIN — FENTANYL CITRATE 50 MCG: 50 INJECTION, SOLUTION INTRAMUSCULAR; INTRAVENOUS at 11:40

## 2022-11-15 RX ADMIN — EPHEDRINE SULFATE 10 MG: 50 INJECTION INTRAVENOUS at 09:21

## 2022-11-15 RX ADMIN — CLINDAMYCIN PHOSPHATE 900 MG: 900 INJECTION, SOLUTION INTRAVENOUS at 08:49

## 2022-11-15 RX ADMIN — SODIUM CHLORIDE, PRESERVATIVE FREE 10 ML: 5 INJECTION INTRAVENOUS at 20:09

## 2022-11-15 RX ADMIN — CITALOPRAM 40 MG: 40 TABLET, FILM COATED ORAL at 14:18

## 2022-11-15 RX ADMIN — HYDROMORPHONE HYDROCHLORIDE 0.5 MG: 1 INJECTION, SOLUTION INTRAMUSCULAR; INTRAVENOUS; SUBCUTANEOUS at 16:04

## 2022-11-15 ASSESSMENT — PAIN SCALES - GENERAL
PAINLEVEL_OUTOF10: 10
PAINLEVEL_OUTOF10: 8
PAINLEVEL_OUTOF10: 9
PAINLEVEL_OUTOF10: 7
PAINLEVEL_OUTOF10: 9
PAINLEVEL_OUTOF10: 5

## 2022-11-15 ASSESSMENT — PAIN DESCRIPTION - LOCATION
LOCATION: BACK

## 2022-11-15 ASSESSMENT — PAIN DESCRIPTION - ONSET
ONSET: ON-GOING

## 2022-11-15 ASSESSMENT — PAIN DESCRIPTION - DESCRIPTORS
DESCRIPTORS: ACHING
DESCRIPTORS: ACHING
DESCRIPTORS: ACHING;DISCOMFORT
DESCRIPTORS: ACHING
DESCRIPTORS: ACHING;DISCOMFORT
DESCRIPTORS: ACHING
DESCRIPTORS: BURNING;ACHING
DESCRIPTORS: ACHING;DISCOMFORT

## 2022-11-15 ASSESSMENT — PAIN DESCRIPTION - ORIENTATION
ORIENTATION: UPPER;LOWER
ORIENTATION: LOWER
ORIENTATION: OUTER;UPPER
ORIENTATION: LOWER
ORIENTATION: LOWER

## 2022-11-15 ASSESSMENT — PAIN DESCRIPTION - FREQUENCY
FREQUENCY: CONTINUOUS

## 2022-11-15 ASSESSMENT — LIFESTYLE VARIABLES: SMOKING_STATUS: 0

## 2022-11-15 ASSESSMENT — PAIN - FUNCTIONAL ASSESSMENT
PAIN_FUNCTIONAL_ASSESSMENT: 0-10
PAIN_FUNCTIONAL_ASSESSMENT: PREVENTS OR INTERFERES SOME ACTIVE ACTIVITIES AND ADLS

## 2022-11-15 ASSESSMENT — PAIN DESCRIPTION - PAIN TYPE
TYPE: ACUTE PAIN;SURGICAL PAIN

## 2022-11-15 NOTE — PROGRESS NOTES
Pt alert and oriented. IV started x 2. Blood drawn and sent to lab. MRSA swab sent. Vancomycin and Clindamycin to OR.

## 2022-11-15 NOTE — PROGRESS NOTES
PACU Transfer Note    Vitals:    11/15/22 1300   BP: 122/68   Pulse: 69   Resp: 12   Temp:    SpO2: 100%       In: 2200 [I.V.:1900]  Out: 350 [Drains:50]    Pain assessment:  level of pain (1-10, 10 severe), 5/10 upper/lower back . Report given to Rekha Kellogg RN at bedside. Patient moving all extremities without difficulty. Patient to room #5509 as scheduled.    Pain Level: 5    Report given to Receiving unit RN.    11/15/2022 1:18 PM

## 2022-11-15 NOTE — PROGRESS NOTES
Notified Dr. Chan Braun patient has insulin pump with continual basal rate. Dr. Chan Braun will discuss with patient.

## 2022-11-15 NOTE — BRIEF OP NOTE
Brief Postoperative Note      Patient: Rachel Oro  YOB: 1955  MRN: [id number]    Date of Procedure: 11/15/2022    Pre-Op Diagnosis: Thoracic myelopathy [M47.14] Thoracic stenosis [M48.04] Spinal stenosis of lumbar region, unspecified whether neurogenic claudication present [M48.061]    Post-Op Diagnosis: Same       Procedure(s):  T5-T9 DECOMPRESSION WITH RESECTION OF EPIDURAL LIPOMATOSIS WITH L2-L5 DECOMPRESSION WITH RESECTION OF EPIDURAL LIPOMATOSIS    Surgeon(s):  Shantanu Riley MD    Assistant:  Physician Assistant: SASHA Molina    Anesthesia: General    Estimated Blood Loss (mL): 093     Complications: None    Specimens:   ID Type Source Tests Collected by Time Destination   A : A) THORACIC EPIDURAL LIPOMA Tissue Tissue SURGICAL PATHOLOGY Shantanu Riley MD 11/15/2022 1035    B : B) LUMBAR EPIDURAL LIPOMA Tissue Tissue SURGICAL PATHOLOGY Shantanu Riley MD 11/15/2022 1041        Implants:  * No implants in log *      Drains:   Closed/Suction Drain Left Back Accordion (Active)       Closed/Suction Drain Right Back Accordion (Active)       Findings: Large Epidural Lipomatosis - lipoma at T5-T9 and L2-L5    Electronically signed by Shantanu Riley MD on 11/15/2022 at 11:24 AM

## 2022-11-15 NOTE — PROGRESS NOTES
Pt admitted to [address]. Oriented to room and call light. 4 eyes skin assessment completed with 2nd RN. All fall precautions in place.

## 2022-11-15 NOTE — CONSULTS
Hospital Medicine  Consult History & Physical        Chief Complaint: Neck pain, back pain    Date of Service: Pt seen/examined in consultation on 11/15/21    History Of Present Illness:      61 y.o. male who we are asked to see/evaluate by Nithya Pedroza MD for medical management of diabetes mellitus type 2, hypertension     Patient has a medical history of insulin-dependent DM , hypertension, hyperlipidemia, TIAs. .. He has been having chronic thoracic and lumbar pain with lower extremity weakness and an MRI demonstrated epidural lipomatosis severe spinal stenosis with cord compression from the T5-6 to T9 levels and severe central stenosis at L3-4 and L4-5 with moderate to severe stenosis at L2-3 this is secondary to a combination of epidural lipomatosis and facet arthropathy. Patient failed outpatient conservative treatment and underwent an elective T5-T9--L2-5,laminectomy, medial facetectomy for decompression and foraminotomies,Resection of Epidural Lipoma/lipomatosis  11/15/22. Hospitalist service was consulted for postop medical management    Past Medical History:        Diagnosis Date    Cerebral artery occlusion with cerebral infarction ([address]     TIA's x 4    Diabetes mellitus ([address]     Hyperlipidemia     Hypertension     TIA (transient ischemic attack)        Past Surgical History:        Procedure Laterality Date    BACK SURGERY      LUMBAR X2    CARDIAC SURGERY      OPEN HEART 2006, SEES DR RASHID NOW    CERVICAL FUSION      C 6-7    ELBOW SURGERY Bilateral     TENDON REPAIR    HAND SURGERY      x9    LUMBAR SPINE SURGERY N/A 11/15/2022    T5-T9 DECOMPRESSION WITH RESECTION OF EPIDURAL LIPOMATOSIS WITH L2-L5 DECOMPRESSION WITH RESECTION OF EPIDURAL LIPOMATOSIS performed by Nithya Pedroza MD at [address] [place] [address] Bilateral     ROTATOR CUFF       Medications Prior to Admission:    Prior to Admission medications    Medication Sig Start Date End Date Taking?  Authorizing Provider insulin lispro (HUMALOG) 100 UNIT/ML injection vial Inject into the skin 3 times daily (before meals) Insulin pump   Yes Historical Provider, MD   rosuvastatin (CRESTOR) 10 MG tablet Take 10 mg by mouth daily   Yes Historical Provider, MD   ezetimibe (ZETIA) 10 MG tablet Take 10 mg by mouth daily   Yes Historical Provider, MD   omeprazole (PRILOSEC) 20 MG delayed release capsule Take 20 mg by mouth daily   Yes Historical Provider, MD   aspirin 81 MG chewable tablet Take 81 mg by mouth daily Taking, but had to stop for Sx,  says he can only take the coated ones so he doesn't get ulcer   Yes Historical Provider, MD   Multiple Vitamins-Minerals (THERAPEUTIC MULTIVITAMIN-MINERALS) tablet Take 1 tablet by mouth daily   Yes Historical Provider, MD   losartan-hydroCHLOROthiazide (HYZAAR) 100-25 MG per tablet Take 1 tablet by mouth daily   Yes Historical Provider, MD   Garlic 7518 MG CAPS Take by mouth   Yes Historical Provider, MD   Coenzyme Q10 (CO Q-10) 100 MG CAPS Take by mouth   Yes Historical Provider, MD   bisoprolol (ZEBETA) 10 MG tablet Take 10 mg by mouth at bedtime   Yes Historical Provider, MD   vitamin E 100 units capsule Take 1 capsule by mouth daily 4/25/19  Yes Historical Provider, MD   ascorbic acid (VITAMIN C) 500 MG tablet Take 1 tablet by mouth daily 12/7/10  Yes Historical Provider, MD   citalopram (CELEXA) 40 MG tablet Take 40 mg by mouth daily   Yes Historical Provider, MD   vitamin D3 (CHOLECALCIFEROL) 125 MCG (5000 UT) TABS tablet Take 1 tablet by mouth daily    Historical Provider, MD       Allergies:  Acetaminophen, Atorvastatin, Ciprofloxacin, Cortisone, Gabapentin, Hydrocodone, Liraglutide, Simvastatin, Tetracycline, and Penicillins    Social History:      The patient currently lives     TOBACCO:   reports that he has never smoked. He has never used smokeless tobacco.  ETOH:   has no history on file for alcohol use.       Family History:      Reviewed in detail and negative for DM, CAD, Cancer, CVA. Positive as follows:    History reviewed. No pertinent family history. REVIEW OF SYSTEMS COMPLETED:   Pertinent positives as noted in the HPI. All other systems reviewed and negative. PHYSICAL EXAM PERFORMED:  /70   Pulse 70   Temp 97.4 °F (36.3 °C) (Oral)   Resp 14   Ht 5' 8.5\" (1.74 m)   Wt 217 lb (98.4 kg)   SpO2 99%   BMI 32.51 kg/m²   General appearance: No apparent distress, appears stated age and cooperative. HEENT: Normal cephalic, atraumatic without obvious deformity. Pupils equal, round, and reactive to light. Extra ocular muscles intact. Conjunctivae/corneas clear. Neck: Supple, with full range of motion. No jugular venous distention. Trachea midline. Respiratory:  Normal respiratory effort. Clear to auscultation, bilaterally without Rales/Wheezes/Rhonchi. Cardiovascular: Regular rate and rhythm with normal S1/S2 without murmurs, rubs or gallops. Abdomen: Soft, non-tender, non-distended with normal bowel sounds. Musculoskeletal:  No clubbing, cyanosis or edema bilaterally. Skin: Skin color, texture, turgor normal.  No rashes or lesions. Neurologic:  Neurovascularly intact without any focal sensory/motor deficits. Cranial nerves: II-XII intact, grossly non-focal.  Psychiatric: Alert and oriented, thought content appropriate, normal insight  Capillary Refill: Brisk,3 seconds, normal   Peripheral Pulses: +2 palpable, equal bilaterally     Labs:     Recent Labs     11/15/22  0757   WBC 3.9*   HGB 14.6   HCT 41.2        No results for input(s): NA, K, CL, CO2, BUN, CREATININE, CALCIUM, PHOS in the last 72 hours. Invalid input(s): MAGNES  No results for input(s): AST, ALT, BILIDIR, BILITOT, ALKPHOS in the last 72 hours. Recent Labs     11/15/22  0757   INR 1.07     No results for input(s): Gisele Tellez in the last 72 hours.     Urinalysis:  No results found for: Sonia Furnish, BACTERIA, RBCUA, BLOODU, [address], [address]    Radiology: I have reviewed the radiology reports with the following interpretation:     XR THORACIC SPINE 1 VW   Final Result   1. Intraoperative fluoroscopy during spine surgery as described. Images demonstrate localization of posterior thoracic and lumbar vertebral elements. XR LUMBAR SPINE 1 VW   Final Result   1. Intraoperative fluoroscopy during spine surgery as described. Images demonstrate localization of posterior thoracic and lumbar vertebral elements. FLUORO FOR SURGICAL PROCEDURES   Final Result   1. Intraoperative fluoroscopy during spine surgery as described. Images demonstrate localization of posterior thoracic and lumbar vertebral elements. EKG:  I have reviewed the EKG with the following interpretation:    @ekgread@      ASSESSMENT:    -Severe central stenosis at T5-T9 with Epidural Lipomatosis/lipoma  -Severe central stenosis at L2-3-4-5 with Epidural Lipomatosis/lipoma    S/p  T5-T9--L2-5,   laminectomy, medial facetectomy for decompression and foraminotomies,Resection of Epidural Lipoma/lipomatosis  11/15/22     Continue postop care per neurosurgery-pain management, PT and OT    Insulin-dependent diabetes mellitus type 2-on insulin pump-continue with insulin pump    Essential hypertension-stable-continue metoprolol, losartan/HCTZ    Hyperlipidemia--continue rosuvastatin, ezetemibe    GERD-continue PPI    History of TIAs--continue statin. .  ASA on hold      DVT Prophylaxis: scds  Diet: ADULT DIET;  Regular  Code Status: Full Code        Thank you for the consultation, will follow up as needed    Electronically signed by Riccardo Mojica MD on 11/15/22 at 2:48 PM EST

## 2022-11-15 NOTE — H&P
[address] [facility] Update H & P  Department of General Surgery   Surgical Service   Pre-operative History and Physical  Last H & P within the last 30 days. DIAGNOSIS:   Thoracic myelopathy [M47.14]  Thoracic stenosis [M48.04]  Spinal stenosis of lumbar region, unspecified whether neurogenic claudication present [M48.061]    Procedure(s):  T5-T9 DECOMPRESSION WITH RESECTION OF EPIDURAL LIPOMATOSIS WITH L2-L5 DECOMPRESSION WITH RESECTION OF EPIDURAL LIPOMATOSIS     History obtained from: Patient interview and EHR     HISTORY OF PRESENT ILLNESS:   The patient is a 61 y.o. male with c/o chronic thoracic pain, lumbar pain  bilateral LE pain and weakness in the setting of MRI demonstrated epidural lipomatosis which demonstrates severe spinal stenosis with cord compression from the T5-6 to T9 levels and severe central stenosis at L3-4 and L4-5 with moderate to severe stenosis at L2-3 this is secondary to a combination of epidural lipomatosis and facet arthropathy. Their symptoms have been recalcitrant to conservative treatment and the patient presents today for the above procedure. Illness Screening: Patient denies fever, chills, worsening cough, or close contact with sick individuals. Past Medical History:        Diagnosis Date    Diabetes mellitus ([address]     Hyperlipidemia     Hypertension     TIA (transient ischemic attack)      Past Surgical History:        Procedure Laterality Date    BACK SURGERY      LUMBAR X2    CARDIAC SURGERY      OPEN HEART 2006, SEES DR RASHID NOW    CERVICAL FUSION      C 6-7    ELBOW SURGERY Bilateral     TENDON REPAIR    HAND SURGERY      x9    SHOULDER SURGERY Bilateral     ROTATOR CUFF           Medications Prior to Admission:      Prior to Admission medications    Medication Sig Start Date End Date Taking?  Authorizing Provider   insulin lispro (HUMALOG) 100 UNIT/ML injection vial Inject into the skin 3 times daily (before meals) Insulin pump   Yes Historical Provider, MD   rosuvastatin (CRESTOR) 10 MG tablet Take 10 mg by mouth daily   Yes Historical Provider, MD   ezetimibe (ZETIA) 10 MG tablet Take 10 mg by mouth daily   Yes Historical Provider, MD   omeprazole (PRILOSEC) 20 MG delayed release capsule Take 20 mg by mouth daily   Yes Historical Provider, MD   aspirin 81 MG chewable tablet Take 81 mg by mouth daily   Yes Historical Provider, MD   Multiple Vitamins-Minerals (THERAPEUTIC MULTIVITAMIN-MINERALS) tablet Take 1 tablet by mouth daily   Yes Historical Provider, MD   losartan-hydroCHLOROthiazide (HYZAAR) 100-25 MG per tablet Take 1 tablet by mouth daily   Yes Historical Provider, MD   Garlic 7691 MG CAPS Take by mouth   Yes Historical Provider, MD   Coenzyme Q10 (CO Q-10) 100 MG CAPS Take by mouth   Yes Historical Provider, MD   bisoprolol (ZEBETA) 10 MG tablet Take 10 mg by mouth at bedtime   Yes Historical Provider, MD   vitamin E 100 units capsule Take 1 capsule by mouth daily 4/25/19  Yes Historical Provider, MD   ascorbic acid (VITAMIN C) 500 MG tablet Take 1 tablet by mouth daily 12/7/10  Yes Historical Provider, MD   citalopram (CELEXA) 40 MG tablet Take 40 mg by mouth daily   Yes Historical Provider, MD   vitamin D3 (CHOLECALCIFEROL) 125 MCG (5000 UT) TABS tablet Take 1 tablet by mouth daily    Historical Provider, MD         Allergies:  Acetaminophen, Atorvastatin, Ciprofloxacin, Cortisone, Gabapentin, Hydrocodone, Liraglutide, Simvastatin, Tetracycline, and Penicillins    PHYSICAL EXAM:      /70   Pulse 65   Temp 97.5 °F (36.4 °C) (Temporal)   Resp 14   Ht 5' 8.5\" (1.74 m)   Wt 217 lb (98.4 kg)   SpO2 96%   BMI 32.51 kg/m²      Airway:  Airway patent with no audible stridor    Heart:  Regular rate and rhythm, No murmur noted    Lungs:  No increased work of breathing, good air exchange, clear to auscultation bilaterally, no crackles or wheezing    Abdomen:  Soft, non-distended, non-tender, no masses palpated    ASSESSMENT AND PLAN    Patient is a 61 y.o. male with above specified procedure planned. 1.  The patients history and physical was obtained and signed off by the pre-admission testing department. Patient seen and focused exam done today- no new changes since last physical exam on 11/7/22    2. Access to ancillary services are available per request of the provider.     AURELIO Castro - DEEPA     11/15/2022

## 2022-11-15 NOTE — ANESTHESIA POSTPROCEDURE EVALUATION
Department of Anesthesiology  Postprocedure Note    Patient: Luc Patel  MRN: [id number]  YOB: 1955  Date of evaluation: 11/15/2022      Procedure Summary     Date: 11/15/22 Room / Location: [address]    Anesthesia Start: 5008 Anesthesia Stop: 9526    Procedure: T5-T9 DECOMPRESSION WITH RESECTION OF EPIDURAL LIPOMATOSIS WITH L2-L5 DECOMPRESSION WITH RESECTION OF EPIDURAL LIPOMATOSIS (Back) Diagnosis:       Thoracic myelopathy      Thoracic stenosis      Spinal stenosis of lumbar region, unspecified whether neurogenic claudication present      (Thoracic myelopathy [M47.14] Thoracic stenosis [M48.04] Spinal stenosis of lumbar region, unspecified whether neurogenic claudication present [M48.061])    Surgeons: Cresencio Walker MD Responsible Provider: Alverto Membreno DO    Anesthesia Type: general ASA Status: 3          Anesthesia Type: No value filed.     Vida Phase I: Vida Score: 8    Vida Phase II:        Anesthesia Post Evaluation    Patient location during evaluation: PACU  Level of consciousness: awake  Complications: no  Multimodal analgesia pain management approach

## 2022-11-15 NOTE — OP NOTE
OPERATIVE NOTE    PATIENT NAME: Nancy Walton  YOB: 1955  MEDICAL RECORD# [id number]  SURGERY DATE: 11/15/2022  SURGEON:  Real Jesus MD  ASSISTANT:   Higinio Rose PA-C was the assistant for the procedure. She provided medically necessary assistance in patient positioning, prep and drape, intraoperative exposure and critical anatomical retraction, closure and postoperative care. PREOPERATIVE DIAGNOSIS:  1. Severe central stenosis at T5-T9 with Epidural Lipomatosis/lipoma  2. Severe central stenosis at L2-3-4-5 with Epidural Lipomatosis/lipoma     POSTOPERATIVE DIAGNOSIS:  1. Same     PROCEDURES PERFORMED:  1. W7-T1-S9-T8-T9 laminectomy, medial facetectomy for decompression and foraminotomies  2. L2/3, L3-4, L4-5, L5  laminectomy, medial facetectomy for decompression and foraminotomies  3. Microdissection using the operating room microscope. 4. Resection of Epidural Lipoma/lipomatosis - W7-Z2-E2-T8-T9 and L2/3, L3-4, L4-5, L5   5. EVOKES  6. Fluoroscopy     ANESTHESIA: General     INDICATIONS FOR SURGERY: The patient is 61 y.o. with back and leg pain and myelopathy. Symptoms failed to respond to conservative intervention. An MRI scan was performed and this showed evidence of   lumbar and degenerative disc disease; however, the most significant finding was a severe central  stenosis at K4-A6-F3-T8-T9 and L2/3, L3-4, L4-5, L5/S1 with associated cord compression and epidural lipomatosis and lipoma. Having failed conservative management and experiencing persistent symptoms, the patient elected to proceed ahead with the surgical option of I9-C3-A6-T8-T9 and L2/3, L3-4, L4-5, L5 laminectomy, decompression and foraminotomies. DETAILS OF PROCEDURE: The patient was brought to the operating room and placed under general anesthesia. The patient was then placed prone on a [employer]. All bony prominences were inspected and padded prior to sterile draping. EVOKES were started.   The Thoracic and lumbosacral area was then prepped and draped in the usual sterile fashion. Using a #15 blade knife, the skin was incised in the midline with 2 incisions and monopolar cautery was used to dissect through the subcutaneous tissue to open the fascia and reflect paraspinal muscles laterally, exposing the posterior elements at T0-R8-U4-T8-T9 and L2/3, L3-4, L4-5, L5. A self-retaining retractor was placed. Lateral fluoroscopic imaging confirmed proper localization at Q5-N4-V8-T8-T9 and L2/3, L3-4, L4-5, L5/. The  Y2-K0-N9-T8-T9 and L2/3, L3-4, L4-5, L5 spinous process were removed with the double action rongeur. The microscope was then brought into the field and used to assist with performing the microsurgical decompression. Using the high-speed [employer] drill, I carefully burred down the adjacent laminar portions of D8-J4-T8-T8-T9 and L2/3, L3-4, L4-5, L5  exposing the intervening ligamentum flavum, which was removed with a 2-mm micropunch bilaterally. Medial facetectomies were performed at each level for full thecal sac decompression - I7-D5-T1-T8-T9 and L2/3, L3-4, L4-5, L5. There was severe  stenosis at the E8-Q7-U9-T8-T9 and L2/3, L3-4, L4-5, L5/S1 levels from the epidural lipoma/lipomatosis, the ligament was removed along the lateral recess bilaterally using the 2 mm micropunch. The descending  nerve roots were then generously decompressed and followed rostrally performing bilateral foraminotomies at each level - E5-X3-S8-T8-T9 and L2/3, L3-4, L4-5, L5. All of L5 was removed. The epidural lipomatosis/lipoma was resected from K3-F5-X9-T8-T9 and L2/3, L3-4, L4-5, L5 and sent to pathology. The entire course of the exiting thecal sac and nerve roots were explored and found to be free of compression. EVOKES were stable. The wounds was copiously irrigated with antibiotic solution and 0.5% Marcaine injected into the subcutaneous tissues. A   drain was place into the subfascial space in each incision.  The wounds were  then closed in the usual fashion using interrupted 0 Vicryl sutures in the fascia, followed by 3-0 Vicryl in the deep dermis and running 4-0 Monocryl in the subcuticular layer. A Dermabond dressing was then applied. The patient was extubated in the operating room and transferred to the recovery room in stable condition. There were no complications. All needle, instrument, and sponge counts were correct. Kenzie Wright PA-C was the assistant for the procedure. She provided medically necessary assistance in patient positioning, prep and drape, intraoperative exposure and critical anatomical retraction, closure and postoperative care. In accordance with CMS guidelines, I attest that I was present for the entire procedure from the creation of the skin incision to the closure. ESTIMATED BLOOD LOSS: 300 mL     SPECIMEN: EPIDURAL LIPOMA    COMPLICATIONS: No complications apparent. DRAINS: ARIEL drain x2     DISPOSITION: The patient was transferred to the PACU in stable condition, awake, alert, and moving all extremities on command.      Dictated by: Marivel Benites MD

## 2022-11-15 NOTE — ANESTHESIA PRE PROCEDURE
Department of Anesthesiology  Preprocedure Note       Name:  Gogo Green   Age:  61 y.o.  :  1955                                          MRN:  [id number]         Date:  11/15/2022      Surgeon: Ren Rene):  Nithya Pedroza MD    Procedure: Procedure(s):  T5-T9 DECOMPRESSION WITH RESECTION OF EPIDURAL LIPOMATOSIS WITH L2-L5 DECOMPRESSION WITH RESECTION OF EPIDURAL LIPOMATOSIS    Medications prior to admission:   Prior to Admission medications    Medication Sig Start Date End Date Taking?  Authorizing Provider   insulin lispro (HUMALOG) 100 UNIT/ML injection vial Inject into the skin 3 times daily (before meals) Insulin pump   Yes Historical Provider, MD   rosuvastatin (CRESTOR) 10 MG tablet Take 10 mg by mouth daily   Yes Historical Provider, MD   ezetimibe (ZETIA) 10 MG tablet Take 10 mg by mouth daily   Yes Historical Provider, MD   omeprazole (PRILOSEC) 20 MG delayed release capsule Take 20 mg by mouth daily   Yes Historical Provider, MD   aspirin 81 MG chewable tablet Take 81 mg by mouth daily   Yes Historical Provider, MD   Multiple Vitamins-Minerals (THERAPEUTIC MULTIVITAMIN-MINERALS) tablet Take 1 tablet by mouth daily   Yes Historical Provider, MD   losartan-hydroCHLOROthiazide (HYZAAR) 100-25 MG per tablet Take 1 tablet by mouth daily   Yes Historical Provider, MD   Garlic 4488 MG CAPS Take by mouth   Yes Historical Provider, MD   Coenzyme Q10 (CO Q-10) 100 MG CAPS Take by mouth   Yes Historical Provider, MD   bisoprolol (ZEBETA) 10 MG tablet Take 10 mg by mouth at bedtime   Yes Historical Provider, MD   vitamin E 100 units capsule Take 1 capsule by mouth daily 19  Yes Historical Provider, MD   ascorbic acid (VITAMIN C) 500 MG tablet Take 1 tablet by mouth daily 12/7/10  Yes Historical Provider, MD   citalopram (CELEXA) 40 MG tablet Take 40 mg by mouth daily   Yes Historical Provider, MD   vitamin D3 (CHOLECALCIFEROL) 125 MCG (5000 UT) TABS tablet Take 1 tablet by mouth daily    Historical Provider, MD       Current medications:    Current Facility-Administered Medications   Medication Dose Route Frequency Provider Last Rate Last Admin    clindamycin (CLEOCIN) 900 mg in dextrose 5 % 50 mL IVPB  900 mg IntraVENous Once Mónica Dang MD        vancomycin (VANCOCIN) 1,500 mg in dextrose 5 % 250 mL IVPB  15 mg/kg IntraVENous Once Mónica Dang MD        lactated ringers infusion   IntraVENous Continuous Amarilis Shahrzad,  mL/hr at 11/15/22 0734 New Bag at 11/15/22 0734    sodium chloride flush 0.9 % injection 5-40 mL  5-40 mL IntraVENous 2 times per day Amarilis Crick, DO        sodium chloride flush 0.9 % injection 5-40 mL  5-40 mL IntraVENous PRN Amarilis Crick, DO        0.9 % sodium chloride infusion   IntraVENous PRN Amarilis Crick, DO           Allergies: Allergies   Allergen Reactions    Acetaminophen Other (See Comments)     Patient states Acetaminophen affects his glucose monitor    Atorvastatin Other (See Comments) and Myalgia       Joint pain      Ciprofloxacin Other (See Comments)     Joint and muscle pain       Cortisone Other (See Comments)     Severe HYPERGLYCEMIA      Gabapentin Other (See Comments)     Other reaction(s): Other (See Comments)  Increased sugar readings  Increased sugar readings  Increased sugar readings  Increased sugar readings      Hydrocodone Other (See Comments)     Agitation, unable to sleep    Liraglutide Other (See Comments)     Heartburn    Simvastatin Other (See Comments) and Myalgia     Joint pain    Tetracycline     Penicillins Rash       Problem List:  There is no problem list on file for this patient.       Past Medical History:        Diagnosis Date    Cerebral artery occlusion with cerebral infarction ([address]     TIA's x 4    Diabetes mellitus ([address]     Hyperlipidemia     Hypertension     TIA (transient ischemic attack)        Past Surgical History:        Procedure Laterality Date    BACK SURGERY      LUMBAR X2    CARDIAC SURGERY      OPEN HEART 2006, SEES DR RASHID NOW    CERVICAL FUSION      C 6-7    ELBOW SURGERY Bilateral     TENDON REPAIR    HAND SURGERY      x9    SHOULDER SURGERY Bilateral     ROTATOR CUFF       Social History:    Social History     Tobacco Use    Smoking status: Never    Smokeless tobacco: Never   Substance Use Topics    Alcohol use: Not on file     Comment: 2 beer a week                                Counseling given: Not Answered      Vital Signs (Current):   Vitals:    11/14/22 0846 11/15/22 0714   BP:  132/70   Pulse:  65   Resp:  14   Temp:  97.5 °F (36.4 °C)   TempSrc:  Temporal   SpO2:  96%   Weight: 215 lb (97.5 kg) 217 lb (98.4 kg)   Height: 5' 8.5\" (1.74 m) 5' 8.5\" (1.74 m)                                              BP Readings from Last 3 Encounters:   11/15/22 132/70       NPO Status: Time of last liquid consumption: 0500 (sips with meds)                        Time of last solid consumption: 1400                        Date of last liquid consumption: 11/15/22                        Date of last solid food consumption: 11/14/22    BMI:   Wt Readings from Last 3 Encounters:   11/15/22 217 lb (98.4 kg)     Body mass index is 32.51 kg/m².     CBC:   Lab Results   Component Value Date/Time    WBC 3.9 11/15/2022 07:57 AM    RBC 4.44 11/15/2022 07:57 AM    HGB 14.6 11/15/2022 07:57 AM    HCT 41.2 11/15/2022 07:57 AM    MCV 92.9 11/15/2022 07:57 AM    RDW 13.4 11/15/2022 07:57 AM     11/15/2022 07:57 AM       CMP: No results found for: NA, K, CL, CO2, BUN, CREATININE, GFRAA, AGRATIO, LABGLOM, GLUCOSE, GLU, PROT, CALCIUM, BILITOT, ALKPHOS, AST, ALT    POC Tests:   Recent Labs     11/15/22  0738   POCGLU 156*       Coags:   Lab Results   Component Value Date/Time    APTT 28.1 11/15/2022 07:57 AM       HCG (If Applicable): No results found for: PREGTESTUR, PREGSERUM, HCG, HCGQUANT     ABGs: No results found for: PHART, PO2ART, DKA8QUZ, OMI4OOW, BEART, D8UFSHLT     Type & Screen (If Applicable):  No results found for: LABABO, LABRH    Drug/Infectious Status (If Applicable):  No results found for: HIV, HEPCAB    COVID-19 Screening (If Applicable): No results found for: COVID19        Anesthesia Evaluation  Patient summary reviewed and Nursing notes reviewed no history of anesthetic complications:   Airway: Mallampati: II  TM distance: <3 FB   Neck ROM: full  Mouth opening: > = 3 FB   Dental: normal exam         Pulmonary: breath sounds clear to auscultation      (-) not a current smoker (quit 1980      )                           Cardiovascular:  Exercise tolerance: good (>4 METS),   (+) hypertension:, CABG/stent (cabg 2006   2V    doing well ):,     (-) past MI    NYHA Classification: II  ECG reviewed  Rhythm: regular  Rate: normal      Cleared by cardiology     Beta Blocker:  Not on Beta Blocker      ROS comment:        Neuro/Psych:   (+) CVA: no interval change, TIA (x 4    tx with asa only   some memory deficits? ),              ROS comment: Bilateral leg pain  No weakness legs GI/Hepatic/Renal:        (-) GERD       Endo/Other:    (+) Diabetes (pt wishes to leave insulin monitor on whilst in sx    well controlled HgA1c  < 7)Type I DM, well controlled, using insulin, . Abdominal:             Vascular: Other Findings:           Anesthesia Plan      general     ASA 3       Induction: intravenous. MIPS: Prophylactic antiemetics administered. Anesthetic plan and risks discussed with patient and spouse. Plan discussed with CRNA.     Attending anesthesiologist reviewed and agrees with Preprocedure content                Anant Oliveira DO   11/15/2022

## 2022-11-15 NOTE — PROGRESS NOTES
Patient to room # 3 PACu post op   Date: 11/15/22 Room / Location: [facility]   Anesthesia Start: 8629 Anesthesia Stop: 9625   Procedure: T5-T9 DECOMPRESSION WITH RESECTION OF EPIDURAL LIPOMATOSIS WITH L2-L5 DECOMPRESSION WITH RESECTION OF EPIDURAL LIPOMATOSIS (Back) Diagnosis:        Thoracic myelopathy       Thoracic stenosis       Spinal stenosis of lumbar region, unspecified whether neurogenic claudication present       (Thoracic myelopathy [M47.14] Thoracic stenosis [M48.04] Spinal stenosis of lumbar region, unspecified whether neurogenic claudication present [M48.061])   Surgeons: Yasir Oh MD Responsible Provider: Chetan De León DO   Anesthesia Type: Not recorded ASA Status: 3   Report received from CRNA at bedside- no problems or concerns during the case. Patient drowsy, awkes easily when spoken to. Upper and lower back incision with prinio intact. ARIEL drain x 2 incisions- compressed with bloddy drainage present. Follows commands- moving upper and lower extremities without difficulty. Good peripheral pulses present. Denies any numbness or tingling. Post op Blood sugar 248 in PACU- patient has own insulin pump intact and bolus given as needed. Wife- called and at bedside- gave patient personal glasses on. Dilaudid IV given for discomfort in upper and lower back area.

## 2022-11-16 LAB
ANION GAP SERPL CALCULATED.3IONS-SCNC: 10 MMOL/L (ref 3–16)
BUN BLDV-MCNC: 9 MG/DL (ref 7–20)
CALCIUM SERPL-MCNC: 8.5 MG/DL (ref 8.3–10.6)
CHLORIDE BLD-SCNC: 93 MMOL/L (ref 99–110)
CO2: 28 MMOL/L (ref 21–32)
CREAT SERPL-MCNC: 0.7 MG/DL (ref 0.8–1.3)
CREAT SERPL-MCNC: 0.8 MG/DL (ref 0.8–1.3)
GFR SERPL CREATININE-BSD FRML MDRD: >60 ML/MIN/{1.73_M2}
GFR SERPL CREATININE-BSD FRML MDRD: >60 ML/MIN/{1.73_M2}
GLUCOSE BLD-MCNC: 124 MG/DL (ref 70–99)
GLUCOSE BLD-MCNC: 127 MG/DL (ref 70–99)
GLUCOSE BLD-MCNC: 142 MG/DL (ref 70–99)
GLUCOSE BLD-MCNC: 159 MG/DL (ref 70–99)
GLUCOSE BLD-MCNC: 177 MG/DL (ref 70–99)
GLUCOSE BLD-MCNC: 178 MG/DL (ref 70–99)
HCT VFR BLD CALC: 36.9 % (ref 40.5–52.5)
HEMOGLOBIN: 12.9 G/DL (ref 13.5–17.5)
MCH RBC QN AUTO: 33 PG (ref 26–34)
MCHC RBC AUTO-ENTMCNC: 35.1 G/DL (ref 31–36)
MCV RBC AUTO: 93.9 FL (ref 80–100)
PDW BLD-RTO: 13 % (ref 12.4–15.4)
PERFORMED ON: ABNORMAL
PLATELET # BLD: 171 K/UL (ref 135–450)
PMV BLD AUTO: 7.9 FL (ref 5–10.5)
POTASSIUM SERPL-SCNC: 3.9 MMOL/L (ref 3.5–5.1)
RBC # BLD: 3.93 M/UL (ref 4.2–5.9)
SODIUM BLD-SCNC: 131 MMOL/L (ref 136–145)
WBC # BLD: 7.2 K/UL (ref 4–11)

## 2022-11-16 PROCEDURE — 97162 PT EVAL MOD COMPLEX 30 MIN: CPT

## 2022-11-16 PROCEDURE — 99024 POSTOP FOLLOW-UP VISIT: CPT | Performed by: NURSE PRACTITIONER

## 2022-11-16 PROCEDURE — 6360000002 HC RX W HCPCS: Performed by: PHYSICIAN ASSISTANT

## 2022-11-16 PROCEDURE — 51798 US URINE CAPACITY MEASURE: CPT

## 2022-11-16 PROCEDURE — 6360000002 HC RX W HCPCS: Performed by: NURSE PRACTITIONER

## 2022-11-16 PROCEDURE — 82565 ASSAY OF CREATININE: CPT

## 2022-11-16 PROCEDURE — 83036 HEMOGLOBIN GLYCOSYLATED A1C: CPT

## 2022-11-16 PROCEDURE — 2580000003 HC RX 258: Performed by: NURSE PRACTITIONER

## 2022-11-16 PROCEDURE — 2580000003 HC RX 258: Performed by: PHYSICIAN ASSISTANT

## 2022-11-16 PROCEDURE — 6370000000 HC RX 637 (ALT 250 FOR IP): Performed by: NURSE PRACTITIONER

## 2022-11-16 PROCEDURE — 51701 INSERT BLADDER CATHETER: CPT

## 2022-11-16 PROCEDURE — 36415 COLL VENOUS BLD VENIPUNCTURE: CPT

## 2022-11-16 PROCEDURE — 97530 THERAPEUTIC ACTIVITIES: CPT

## 2022-11-16 PROCEDURE — 80048 BASIC METABOLIC PNL TOTAL CA: CPT

## 2022-11-16 PROCEDURE — 97116 GAIT TRAINING THERAPY: CPT

## 2022-11-16 PROCEDURE — 6370000000 HC RX 637 (ALT 250 FOR IP): Performed by: PHYSICIAN ASSISTANT

## 2022-11-16 PROCEDURE — 85027 COMPLETE CBC AUTOMATED: CPT

## 2022-11-16 PROCEDURE — 1200000000 HC SEMI PRIVATE

## 2022-11-16 RX ORDER — LIDOCAINE 4 G/G
2 PATCH TOPICAL DAILY
Status: DISCONTINUED | OUTPATIENT
Start: 2022-11-16 | End: 2022-11-21 | Stop reason: HOSPADM

## 2022-11-16 RX ADMIN — OXYCODONE 10 MG: 5 TABLET ORAL at 18:48

## 2022-11-16 RX ADMIN — OXYCODONE 10 MG: 5 TABLET ORAL at 14:47

## 2022-11-16 RX ADMIN — FAMOTIDINE 20 MG: 20 TABLET ORAL at 20:08

## 2022-11-16 RX ADMIN — SENNOSIDES AND DOCUSATE SODIUM 1 TABLET: 50; 8.6 TABLET ORAL at 20:08

## 2022-11-16 RX ADMIN — LOSARTAN POTASSIUM AND HYDROCHLOROTHIAZIDE 1 TABLET: 100; 25 TABLET, FILM COATED ORAL at 08:07

## 2022-11-16 RX ADMIN — SODIUM CHLORIDE, PRESERVATIVE FREE 10 ML: 5 INJECTION INTRAVENOUS at 11:38

## 2022-11-16 RX ADMIN — EZETIMIBE 10 MG: 10 TABLET ORAL at 08:07

## 2022-11-16 RX ADMIN — METHOCARBAMOL 1000 MG: 100 INJECTION, SOLUTION INTRAMUSCULAR; INTRAVENOUS at 04:19

## 2022-11-16 RX ADMIN — HYDROMORPHONE HYDROCHLORIDE 0.5 MG: 1 INJECTION, SOLUTION INTRAMUSCULAR; INTRAVENOUS; SUBCUTANEOUS at 17:22

## 2022-11-16 RX ADMIN — SENNOSIDES AND DOCUSATE SODIUM 1 TABLET: 50; 8.6 TABLET ORAL at 08:07

## 2022-11-16 RX ADMIN — HYDROMORPHONE HYDROCHLORIDE 0.5 MG: 1 INJECTION, SOLUTION INTRAMUSCULAR; INTRAVENOUS; SUBCUTANEOUS at 20:09

## 2022-11-16 RX ADMIN — DIAZEPAM 5 MG: 5 TABLET ORAL at 06:41

## 2022-11-16 RX ADMIN — CITALOPRAM 40 MG: 40 TABLET, FILM COATED ORAL at 08:07

## 2022-11-16 RX ADMIN — SODIUM CHLORIDE, PRESERVATIVE FREE 10 ML: 5 INJECTION INTRAVENOUS at 20:08

## 2022-11-16 RX ADMIN — HYDROMORPHONE HYDROCHLORIDE 0.5 MG: 1 INJECTION, SOLUTION INTRAMUSCULAR; INTRAVENOUS; SUBCUTANEOUS at 08:08

## 2022-11-16 RX ADMIN — SODIUM CHLORIDE, POTASSIUM CHLORIDE, SODIUM LACTATE AND CALCIUM CHLORIDE: 600; 310; 30; 20 INJECTION, SOLUTION INTRAVENOUS at 03:09

## 2022-11-16 RX ADMIN — DIAZEPAM 5 MG: 5 TABLET ORAL at 13:50

## 2022-11-16 RX ADMIN — OXYCODONE 10 MG: 5 TABLET ORAL at 10:38

## 2022-11-16 RX ADMIN — POLYETHYLENE GLYCOL 3350 17 G: 17 POWDER, FOR SOLUTION ORAL at 08:07

## 2022-11-16 RX ADMIN — FAMOTIDINE 20 MG: 20 TABLET ORAL at 08:07

## 2022-11-16 RX ADMIN — OXYCODONE 10 MG: 5 TABLET ORAL at 22:43

## 2022-11-16 RX ADMIN — ENOXAPARIN SODIUM 40 MG: 100 INJECTION SUBCUTANEOUS at 08:06

## 2022-11-16 RX ADMIN — METHOCARBAMOL 1000 MG: 100 INJECTION, SOLUTION INTRAMUSCULAR; INTRAVENOUS at 15:27

## 2022-11-16 RX ADMIN — METHOCARBAMOL 750 MG: 750 TABLET ORAL at 20:08

## 2022-11-16 RX ADMIN — SODIUM CHLORIDE: 9 INJECTION, SOLUTION INTRAVENOUS at 03:05

## 2022-11-16 RX ADMIN — OXYCODONE 10 MG: 5 TABLET ORAL at 06:41

## 2022-11-16 RX ADMIN — ROSUVASTATIN CALCIUM 10 MG: 10 TABLET, COATED ORAL at 08:06

## 2022-11-16 RX ADMIN — DIAZEPAM 5 MG: 5 TABLET ORAL at 20:08

## 2022-11-16 RX ADMIN — HYDROMORPHONE HYDROCHLORIDE 0.5 MG: 1 INJECTION, SOLUTION INTRAMUSCULAR; INTRAVENOUS; SUBCUTANEOUS at 04:19

## 2022-11-16 RX ADMIN — METOPROLOL SUCCINATE 100 MG: 100 TABLET, EXTENDED RELEASE ORAL at 08:06

## 2022-11-16 RX ADMIN — HYDROMORPHONE HYDROCHLORIDE 0.5 MG: 1 INJECTION, SOLUTION INTRAMUSCULAR; INTRAVENOUS; SUBCUTANEOUS at 12:40

## 2022-11-16 RX ADMIN — PANTOPRAZOLE SODIUM 40 MG: 40 TABLET, DELAYED RELEASE ORAL at 06:41

## 2022-11-16 RX ADMIN — OXYCODONE 10 MG: 5 TABLET ORAL at 03:05

## 2022-11-16 ASSESSMENT — PAIN - FUNCTIONAL ASSESSMENT
PAIN_FUNCTIONAL_ASSESSMENT: PREVENTS OR INTERFERES SOME ACTIVE ACTIVITIES AND ADLS

## 2022-11-16 ASSESSMENT — PAIN DESCRIPTION - PAIN TYPE
TYPE: ACUTE PAIN;SURGICAL PAIN

## 2022-11-16 ASSESSMENT — PAIN SCALES - GENERAL
PAINLEVEL_OUTOF10: 9
PAINLEVEL_OUTOF10: 9
PAINLEVEL_OUTOF10: 8
PAINLEVEL_OUTOF10: 8
PAINLEVEL_OUTOF10: 10
PAINLEVEL_OUTOF10: 9
PAINLEVEL_OUTOF10: 10
PAINLEVEL_OUTOF10: 9
PAINLEVEL_OUTOF10: 10
PAINLEVEL_OUTOF10: 8
PAINLEVEL_OUTOF10: 9

## 2022-11-16 ASSESSMENT — PAIN DESCRIPTION - FREQUENCY
FREQUENCY: CONTINUOUS

## 2022-11-16 ASSESSMENT — PAIN DESCRIPTION - DESCRIPTORS
DESCRIPTORS: ACHING;BURNING;DISCOMFORT;DULL
DESCRIPTORS: ACHING;BURNING;DISCOMFORT
DESCRIPTORS: ACHING;DISCOMFORT
DESCRIPTORS: ACHING;DISCOMFORT

## 2022-11-16 ASSESSMENT — PAIN DESCRIPTION - LOCATION
LOCATION: BACK

## 2022-11-16 ASSESSMENT — PAIN DESCRIPTION - ORIENTATION
ORIENTATION: LOWER
ORIENTATION: LOWER;UPPER
ORIENTATION: LOWER
ORIENTATION: LOWER;UPPER

## 2022-11-16 ASSESSMENT — PAIN DESCRIPTION - ONSET
ONSET: ON-GOING

## 2022-11-16 NOTE — PROGRESS NOTES
Hospital Medicine  Consult progress note      Chief Complaint: Neck pain, back pain    Date of Service: Pt seen/examined in consultation on 11/15/21    History Of Present Illness:      61 y.o. male who we are asked to see/evaluate by Evelyn Walton MD for medical management of diabetes mellitus type 2, hypertension     Patient has a medical history of insulin-dependent DM , hypertension, hyperlipidemia, TIAs. .. He has been having chronic thoracic and lumbar pain with lower extremity weakness and an MRI demonstrated epidural lipomatosis severe spinal stenosis with cord compression from the T5-6 to T9 levels and severe central stenosis at L3-4 and L4-5 with moderate to severe stenosis at L2-3 this is secondary to a combination of epidural lipomatosis and facet arthropathy. Patient failed outpatient conservative treatment and underwent an elective T5-T9--L2-5,laminectomy, medial facetectomy for decompression and foraminotomies,Resection of Epidural Lipoma/lipomatosis  11/15/22. Hospitalist service was consulted for postop medical management    Interval history:  Patient was seen and examined at bedside today, alert and oriented. Pleasant gentleman. No acute events reported or observed overnight. Was ambulating with physical therapist upon my evaluation. Continue to report back pain, improve with analgesia. No new complaints.       Past Medical History:        Diagnosis Date    Cerebral artery occlusion with cerebral infarction ([address]     TIA's x 4    Diabetes mellitus ([address]     Hyperlipidemia     Hypertension     TIA (transient ischemic attack)        Past Surgical History:        Procedure Laterality Date    BACK SURGERY      LUMBAR X2    CARDIAC SURGERY      OPEN HEART 2006, SEES DR RASHID NOW    CERVICAL FUSION      C 6-7    ELBOW SURGERY Bilateral     TENDON REPAIR    HAND SURGERY      x9    LUMBAR SPINE SURGERY N/A 11/15/2022    T5-T9 DECOMPRESSION WITH RESECTION OF EPIDURAL LIPOMATOSIS WITH Tetracycline, and Penicillins    Social History:      The patient currently lives     TOBACCO:   reports that he has never smoked. He has never used smokeless tobacco.  ETOH:   has no history on file for alcohol use. Family History:      Reviewed in detail and negative for DM, CAD, Cancer, CVA. Positive as follows:    History reviewed. No pertinent family history. REVIEW OF SYSTEMS COMPLETED:   Pertinent positives as noted in the HPI. All other systems reviewed and negative. PHYSICAL EXAM PERFORMED:  BP (!) 102/55 Comment: Resting in bed  Pulse 79   Temp 98.5 °F (36.9 °C) (Oral)   Resp 17   Ht 5' 8.5\" (1.74 m)   Wt 217 lb (98.4 kg)   SpO2 92%   BMI 32.51 kg/m²   General appearance: No apparent distress, appears stated age and cooperative. HEENT: Normal cephalic, atraumatic without obvious deformity. Pupils equal, round, and reactive to light. Extra ocular muscles intact. Conjunctivae/corneas clear. Neck: Supple, with full range of motion. No jugular venous distention. Trachea midline. Respiratory:  Normal respiratory effort. Clear to auscultation, bilaterally without Rales/Wheezes/Rhonchi. Cardiovascular: Regular rate and rhythm with normal S1/S2 without murmurs, rubs or gallops. Abdomen: Soft, non-tender, non-distended with normal bowel sounds. Musculoskeletal:  No clubbing, cyanosis or edema bilaterally. Skin: Skin color, texture, turgor normal.  No rashes or lesions. Neurologic:  Neurovascularly intact without any focal sensory/motor deficits. Cranial nerves: II-XII intact, grossly non-focal.  Psychiatric: Alert and oriented, thought content appropriate, normal insight  Capillary Refill: Brisk,3 seconds, normal     Labs:     Recent Labs     11/15/22  0757 11/16/22  0636   WBC 3.9* 7.2   HGB 14.6 12.9*   HCT 41.2 36.9*    171     Recent Labs     11/16/22  0636   CREATININE 0.8     No results for input(s): AST, ALT, BILIDIR, BILITOT, ALKPHOS in the last 72 hours.   Recent Labs 11/15/22  0757   INR 1.07     No results for input(s): Carina Gallardo in the last 72 hours. Urinalysis:  No results found for: Vicky Serrano, BACTERIA, RBCUA, BLOODU, [address], [address]    Radiology: I have reviewed the radiology reports with the following interpretation:     XR THORACIC SPINE 1 VW   Final Result   1. Intraoperative fluoroscopy during spine surgery as described. Images demonstrate localization of posterior thoracic and lumbar vertebral elements. XR LUMBAR SPINE 1 VW   Final Result   1. Intraoperative fluoroscopy during spine surgery as described. Images demonstrate localization of posterior thoracic and lumbar vertebral elements. FLUORO FOR SURGICAL PROCEDURES   Final Result   1. Intraoperative fluoroscopy during spine surgery as described. Images demonstrate localization of posterior thoracic and lumbar vertebral elements. ASSESSMENT:    -Severe central stenosis at T5-T9 with Epidural Lipomatosis/lipoma  -Severe central stenosis at L2-3-4-5 with Epidural Lipomatosis/lipoma    S/p  T5-T9--L2-5,   laminectomy, medial facetectomy for decompression and foraminotomies,Resection of Epidural Lipoma/lipomatosis  11/15/22     Continue postop care per neurosurgery-pain management, PT and OT    Insulin-dependent diabetes mellitus type 2-on insulin pump-continue with insulin pump    Essential hypertension-elevated-  -Decrease IV fluid from 125 mL to 50 mL/h. Consider discontinuation of IV fluid. Continue metoprolol, losartan/HCTZ    Hyperlipidemia--continue rosuvastatin, ezetemibe    GERD-continue PPI    History of TIAs--continue statin. .  ASA on hold      DVT Prophylaxis: scds  Diet: ADULT DIET;  Regular  Code Status: Full Code        Thank you for the consultation, will follow up as needed    Electronically signed by Ricarda Pfeiffer MD on 11/16/2022 at 2:02 PM

## 2022-11-16 NOTE — PLAN OF CARE
Problem: Pain  Goal: Verbalizes/displays adequate comfort level or baseline comfort level  Outcome: Progressing   Pain to upper and lower back treated with PO and IV medication as well as ambulation and ICE therapy. Problem: Safety - Adult  Goal: Free from fall injury  Outcome: Progressing  Calls out appropriately. Bed locked in lowest position. Bed alarm on. Call light/belongings within reach.

## 2022-11-16 NOTE — PROGRESS NOTES
Physical Therapy  Facility/Department: [facility] [address] ORTHO/NEURO  Physical Therapy Initial Assessment / treatment    Name: Jose Barros  : 1955  MRN: [id number]  Date of Service: 2022    Discharge Recommendations: Jose Barros scored a 17/24 on the AM-PAC short mobility form. Current research shows that an AM-PAC score of 18 or greater is typically associated with a discharge to the patient's home setting. Based on the patient's AM-PAC score and their current functional mobility deficits, it is recommended that the patient have 2-3 sessions per week of Physical Therapy at d/c to increase the patient's independence. At this time, this patient demonstrates the endurance and safety to discharge home with home services and a follow up treatment frequency of 2-3x/wk. Please see assessment section for further patient specific details. If patient discharges prior to next session this note will serve as a discharge summary. Please see below for the latest assessment towards goals. PT Equipment Recommendations  Other: may need RW - will continue to assess      Patient Diagnosis(es): Diagnoses of Thoracic myelopathy, Thoracic stenosis, and Spinal stenosis of lumbar region, unspecified whether neurogenic claudication present were pertinent to this visit. Past Medical History:  has a past medical history of Cerebral artery occlusion with cerebral infarction ([address] Diabetes mellitus ([address] Hyperlipidemia, Hypertension, and TIA (transient ischemic attack). Past Surgical History:  has a past surgical history that includes Cardiac surgery; Hand surgery; cervical fusion; shoulder surgery (Bilateral); Elbow surgery (Bilateral); back surgery; and Lumbar spine surgery (N/A, 11/15/2022).     Assessment   Body Structures, Functions, Activity Limitations Requiring Skilled Therapeutic Intervention: Decreased functional mobility   Assessment: Pt is 61 y.o. male POD #1 s/p T5-T9 DECOMPRESSION WITH RESECTION OF EPIDURAL LIPOMATOSIS WITH L2-L5 DECOMPRESSION WITH RESECTION OF EPIDURAL LIPOMATOSIS. Pt is from home and indep with ambulation at baseline. Requires CGA to min A and use of RW for safe amb today. Pt adament about taking long walk in dal[place] this AM (note 3rd amb in dalal today per pt report). Increased pain post amb. Rec frequent shorter walkers for pain control purposes. Discussed with RN and pt. Pt prefers to amb without AD at home - will trial amb without AD at future visit if appropriate. Pt plans to return home at d/c with family providing assist.  Rec 24hr assist initially and home PT. Will follow  Treatment Diagnosis: impaired gait and transfers  Therapy Prognosis: Good  Decision Making: Medium Complexity  Requires PT Follow-Up: Yes     Plan   Physcial Therapy Plan  General Plan: 5-7 times per week  Current Treatment Recommendations: Functional mobility training, Transfer training, Balance training, Gait training, Stair training, Patient/Caregiver education & training, Therapeutic activities, Equipment evaluation, education, & procurement  Safety Devices  Type of Devices: Call light within reach, Bed alarm in place, Left in bed, Nurse notified, Gait belt     Restrictions  Position Activity Restriction  Spinal Precautions: No Bending, No Twisting, No Lifting  Other position/activity restrictions: activity as tolerated , 2 drains in place, insulin pump    Subjective   General  Chart Reviewed: Yes  Additional Pertinent Hx: Admit 11/15 for T5-T9 DECOMPRESSION WITH RESECTION OF EPIDURAL LIPOMATOSIS WITH L2-L5 DECOMPRESSION WITH RESECTION OF EPIDURAL LIPOMATOSIS; PMHx:  Referring Practitioner: SASHA Dodson  Diagnosis: lumbar and thoracic spinal stenosis  Subjective  Subjective: Pt found standing in room with nsg staff, pt preparing to amb into bathroom. Reports 10/10 back pain, RN notified. \"I've alredy walked twice. \"         Social/Functional History  Social/Functional History  Lives With: Spouse  Type of Home: House  Home Layout: Able to Live on Main level with bedroom/bathroom (bed/bath on main level; 13 steps down to basement)  Bathroom Shower/Tub: Walk-in shower  Bathroom Toilet: Standard  Bathroom Equipment:  (none)  Home Equipment:  (none)  ADL Assistance: Independent  Homemaking Assistance: Independent  Ambulation Assistance: Independent  Transfer Assistance: Needs assistance (pt reports wife provides assist with getting LEs into bed at home; otherwise, pt indep)  Active : Yes  Occupation: Part time employment  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  Additional Comments: wife works as RN on the weekends  [address]: Within Functional Limits  Hearing  Hearing: Within functional limits    Cognition   Orientation  Overall Orientation Status: Within Functional Limits  Cognition  Overall Cognitive Status: Exceptions     Objective               AROM RLE (degrees)  RLE AROM: WFL  AROM LLE (degrees)  LLE AROM : WFL  Strength RLE  Strength RLE: WFL  Strength LLE  Strength LLE: WFL           Bed mobility  Rolling to Left: Contact guard assistance  Sit to Supine:  Moderate assistance (HOB elevated, use of rail, cues for log roll)  Scooting: Contact guard assistance (scooting to EOB)  Transfers  Sit to Stand: Contact guard assistance  Stand to Sit: Contact guard assistance  Comment: cues for safe hand placement with transfers  Ambulation  Device: [address]: Minimal assistance (mostly CGA but required min A towards end of amb)  Quality of Gait: slow but continuous steps, guarded posture with B shoulders elevated - min cues to correct, slightly unsteady towards end of amb when pain increased  Gait Deviations: Decreased step height;Decreased step length  Distance: 30 ft; 400 ft     Balance  Sitting - Static: Fair  Sitting - Dynamic: Fair  Standing - Static: Fair  Standing - Dynamic: Fair           OutComes Score AM-PAC Score  AM-PAC Inpatient Mobility Raw Score : 17 (11/16/22 1044)  AM-PAC Inpatient T-Scale Score : 42.13 (11/16/22 1044)  Mobility Inpatient CMS 0-100% Score: 50.57 (11/16/22 1044)  Mobility Inpatient CMS G-Code Modifier : CK (11/16/22 1044)          Tinneti Score       Goals  Short Term Goals  Time Frame for Short Term Goals: discharge  Short Term Goal 1: Pt will transfer supine <--> sit with CGA via log roll  Short Term Goal 2: Pt will transfer sit <--> stand with supervision  Short Term Goal 3: Pt will ambulate 150 ft with LRAD and supervision  Short Term Goal 4: Pt will negotiate 4 steps with CGA  Patient Goals   Patient Goals : return home, ambulate without AD       Education  Patient Education  Education Given To: Patient  Education Provided: Role of Therapy  Education Method: Demonstration  Barriers to Learning: None  Education Outcome: Verbalized understanding;Continued education needed      Therapy Time   Individual Concurrent Group Co-treatment   Time In 0940         Time Out 1025         Minutes 45               Timed Code Treatment Minutes:  30    Total Treatment Minutes:  45    If patient is discharged prior to next treatment, this note will serve as the discharge summary.   Marisol Galvin, PT, DPT  [phone]

## 2022-11-16 NOTE — PROGRESS NOTES
NEUROSURGERY POST-OP PROGRESS NOTE    Patient Name: Sharron Beltran YOB: 1955   Sex: Male Age: 61 yrs     Medical Record Number: [id number] Acct Number: [de-identified]   Room Number: 6805/3373-98 Hospital Day: Hospital Day: 2     Interval History:  Post-operative Day# 1 s/p Procedure(s) (LRB):  T5-T9 DECOMPRESSION WITH RESECTION OF EPIDURAL LIPOMATOSIS WITH L2-L5 DECOMPRESSION WITH RESECTION OF EPIDURAL LIPOMATOSIS (N/A)    Subjective: Pt struggling with incisional pain. The medicine takes the edge off he reports. Objective:    VITAL SIGNS   BP (!) 102/55 Comment: Resting in bed  Pulse 79   Temp 98.5 °F (36.9 °C) (Oral)   Resp 17   Ht 5' 8.5\" (1.74 m)   Wt 217 lb (98.4 kg)   SpO2 92%   BMI 32.51 kg/m²    Height Height: 5' 8.5\" (174 cm)   Weight Weight: 217 lb (98.4 kg)        Allergies Allergies   Allergen Reactions    Acetaminophen Other (See Comments)     Patient states Acetaminophen affects his glucose monitor    Atorvastatin Other (See Comments) and Myalgia       Joint pain      Ciprofloxacin Other (See Comments)     Joint and muscle pain       Cortisone Other (See Comments)     Severe HYPERGLYCEMIA      Gabapentin Other (See Comments)     Other reaction(s): Other (See Comments)  Increased sugar readings  Increased sugar readings  Increased sugar readings  Increased sugar readings      Hydrocodone Other (See Comments)     Agitation, unable to sleep    Liraglutide Other (See Comments)     Heartburn    Simvastatin Other (See Comments) and Myalgia     Joint pain    Tetracycline     Penicillins Rash      NPO Status ADULT DIET;  Regular   Isolation No active isolations     LABS   Basic Metabolic Profile Recent Labs     11/16/22  0636   CREATININE 0.8      Complete Blood Count Recent Labs     11/15/22  0757 11/16/22  0636   WBC 3.9* 7.2 RBC 4.44 3.93*      Coagulation Studies Recent Labs     11/15/22  0757   INR 1.07        MEDICATIONS   Inpatient Medications     rosuvastatin, 10 mg, Oral, Daily    pantoprazole, 40 mg, Oral, QAM AC    losartan-hydroCHLOROthiazide, 1 tablet, Oral, Daily    ezetimibe, 10 mg, Oral, Daily    citalopram, 40 mg, Oral, Daily    metoprolol succinate, 100 mg, Oral, Daily    sodium chloride flush, 5-40 mL, IntraVENous, 2 times per day    famotidine, 20 mg, Oral, BID **OR** famotidine (PEPCID) injection, 20 mg, IntraVENous, BID    enoxaparin, 40 mg, SubCUTAneous, Daily    sennosides-docusate sodium, 1 tablet, Oral, BID    Insulin Pump - Bolus Dose, , SubCUTAneous, 4x Daily AC & HS    Insulin Pump - Basal Dose, , SubCUTAneous, Daily    methocarbamol IVPB, 1,000 mg, IntraVENous, Q8H **FOLLOWED BY** methocarbamol, 750 mg, Oral, Q6H    polyethylene glycol, 17 g, Oral, Daily   Infusions    lactated ringers 125 mL/hr at 11/16/22 0309    sodium chloride Stopped (11/16/22 0306)    dextrose        Antibiotics   Recent Abx Admin                     vancomycin (VANCOCIN) 1,500 mg in dextrose 5 % 250 mL IVPB (mg) 1,500 mg New Bag 11/15/22 2017                     Neurologic Exam:  Mental status: awake and alert and oriented x4    Musculoskeletal:   Gait: Not tested   Tone: normal  Sensory: intact to all extremities  Motor strength:    Right  Left    Right  Left    Deltoid  5 5  Hip Flex  5 5   Biceps  5 5  Knee Extensors  5 5   Triceps  5 5  Knee Flexors  5 5   Wrist Ext  5 5  Ankle Dorsiflex. 5 5   Wrist Flex  5 5  Ankle Plantarflex.   5 5   Handgrip  5 5  Ext Kishan Longus  5 5   Thumb Ext  5 5         Incision: intact, clean and dry  Drain:295/230=925    Respiratory:  Unlabored respiratory pattern    Abdomen:   Soft, ND   Last BM preop    Cardiovascular:  Warm, well perfused    Assessment   Patient is a 62 yo M s/p Procedure(s) (LRB):  T5-T9 DECOMPRESSION WITH RESECTION OF EPIDURAL LIPOMATOSIS WITH L2-L5 DECOMPRESSION WITH RESECTION OF EPIDURAL LIPOMATOSIS (N/A) per Dr. Gerson Dominguez . Plan:    Neurologic exam frequency:q4  Mobility:PT/OT eval  Diet:ADAT  Antibiotics:post op complete  Nichols:removed  Drain: continue till output < 30  shift  DVT Prophylaxis: SCDs and lovenox  Bowel Regimen: senna and glycolax   Pain control:elli and lidocaine patch  Spasm: valium prn and robaxin scheduled  Ice to incision area q2  Incisional Care:open to air and wash with warm soapy water daily  Hospitalist for comorbidity management  Dispo Plannin-2 more days to get pain under control   Patient was discussed with Dr. Gerson Dominguez who agrees with above assessment and plan. Electronically signed by: AURELIO West CNP, 2022 11:21 AM   Neurosurgery Nurse Practitioner  [phone]   I spent 30 minutes in the care of this patient.   Over 50% of that time was in face-to-face counseling regarding post op progression, pain management strategies, and answering patient and family questions

## 2022-11-16 NOTE — PROGRESS NOTES
Patient a/o x4. VSS. Pain to upper and lower back treated with PO and IV medication. Incisions C/D/I with scant serosanguinous drainage. x2 ARIEL in place to bulb suction with bloody output. Patient ambulating well x1 walker/gb. Straight cath x1 overnight. Resting intermittently overnight.

## 2022-11-16 NOTE — CARE COORDINATION
CM following. Patient is from home with his wife; no current home services. Referral placed to [facility]. Patient continues inpatient stay for pain control and drain monitoring. Pt may need RW at dc; cm will continue to follow chart for dc needs.     Electronically signed by Fei Skelton RN on 11/16/2022 at 2:01 PM

## 2022-11-16 NOTE — PROGRESS NOTES
VSS on room air. Aox4. No acute events this shift. Ambulating assist x1 with rw and gb, denies dizziness. Voiding via BRP/urinal, experiencing retention, but not enough to meet straight cath orders. Pt urinated 200ml at 1626, PVR was 271. Will continue to monitor. Skin C/D/I with exception to incisions. Incisions C/D/I, cleansed and painted with CHG this shift. Drains x2 C/D/I, compressed. Tolerating oral diet and PO fluids, denies N/V. Endorsing pain 9/10 to back, treated with PRN pain medications and scheduled medications per MAR. All fall precautions in place.

## 2022-11-16 NOTE — PLAN OF CARE
Problem: Pain  Goal: Verbalizes/displays adequate comfort level or baseline comfort level  11/16/2022 1148 by Mikki Merida RN  Outcome: Progressing   Pt endorsing pain to back. Being treated with PRN pain medication, rest, and frequent repositioning with pillow support for comfort and pressure relief. Pt reports some relief from pain with above interventions. Problem: Safety - Adult  Goal: Free from fall injury  11/16/2022 1148 by Mikki Merida RN  Outcome: Progressing   All fall precautions in place. Bed locked and in lowest position with alarm on. Overbed table and personal belonings within reach. Call light within reach and patient instructed to use call light for assistance. Non-skid socks on.

## 2022-11-17 ENCOUNTER — APPOINTMENT (OUTPATIENT)
Dept: CT IMAGING | Age: 67
DRG: 519 | End: 2022-11-17
Attending: NEUROLOGICAL SURGERY
Payer: MEDICARE

## 2022-11-17 ENCOUNTER — APPOINTMENT (OUTPATIENT)
Dept: ULTRASOUND IMAGING | Age: 67
DRG: 519 | End: 2022-11-17
Attending: NEUROLOGICAL SURGERY
Payer: MEDICARE

## 2022-11-17 LAB
AMYLASE: 16 U/L (ref 25–115)
ESTIMATED AVERAGE GLUCOSE: 151.3 MG/DL
GLUCOSE BLD-MCNC: 138 MG/DL (ref 70–99)
GLUCOSE BLD-MCNC: 138 MG/DL (ref 70–99)
GLUCOSE BLD-MCNC: 171 MG/DL (ref 70–99)
HBA1C MFR BLD: 6.9 %
LIPASE: 6 U/L (ref 13–60)
PERFORMED ON: ABNORMAL
TROPONIN: <0.01 NG/ML

## 2022-11-17 PROCEDURE — 6360000002 HC RX W HCPCS: Performed by: PHYSICIAN ASSISTANT

## 2022-11-17 PROCEDURE — 6370000000 HC RX 637 (ALT 250 FOR IP): Performed by: NURSE PRACTITIONER

## 2022-11-17 PROCEDURE — 97116 GAIT TRAINING THERAPY: CPT

## 2022-11-17 PROCEDURE — 83690 ASSAY OF LIPASE: CPT

## 2022-11-17 PROCEDURE — 97535 SELF CARE MNGMENT TRAINING: CPT

## 2022-11-17 PROCEDURE — 74177 CT ABD & PELVIS W/CONTRAST: CPT

## 2022-11-17 PROCEDURE — 82150 ASSAY OF AMYLASE: CPT

## 2022-11-17 PROCEDURE — 84484 ASSAY OF TROPONIN QUANT: CPT

## 2022-11-17 PROCEDURE — 36415 COLL VENOUS BLD VENIPUNCTURE: CPT

## 2022-11-17 PROCEDURE — 6370000000 HC RX 637 (ALT 250 FOR IP): Performed by: PHYSICIAN ASSISTANT

## 2022-11-17 PROCEDURE — 2580000003 HC RX 258: Performed by: PHYSICIAN ASSISTANT

## 2022-11-17 PROCEDURE — 97166 OT EVAL MOD COMPLEX 45 MIN: CPT

## 2022-11-17 PROCEDURE — 6360000004 HC RX CONTRAST MEDICATION: Performed by: INTERNAL MEDICINE

## 2022-11-17 PROCEDURE — 6370000000 HC RX 637 (ALT 250 FOR IP): Performed by: INTERNAL MEDICINE

## 2022-11-17 PROCEDURE — 1200000000 HC SEMI PRIVATE

## 2022-11-17 PROCEDURE — 99024 POSTOP FOLLOW-UP VISIT: CPT | Performed by: NURSE PRACTITIONER

## 2022-11-17 PROCEDURE — 76770 US EXAM ABDO BACK WALL COMP: CPT

## 2022-11-17 PROCEDURE — 93005 ELECTROCARDIOGRAM TRACING: CPT | Performed by: NURSE PRACTITIONER

## 2022-11-17 RX ORDER — METHOCARBAMOL 500 MG/1
1000 TABLET, FILM COATED ORAL EVERY 6 HOURS
Status: DISCONTINUED | OUTPATIENT
Start: 2022-11-17 | End: 2022-11-21 | Stop reason: HOSPADM

## 2022-11-17 RX ORDER — PANTOPRAZOLE SODIUM 40 MG/1
40 TABLET, DELAYED RELEASE ORAL
Status: DISCONTINUED | OUTPATIENT
Start: 2022-11-17 | End: 2022-11-21 | Stop reason: HOSPADM

## 2022-11-17 RX ORDER — TAMSULOSIN HYDROCHLORIDE 0.4 MG/1
0.4 CAPSULE ORAL DAILY
Status: DISCONTINUED | OUTPATIENT
Start: 2022-11-17 | End: 2022-11-21 | Stop reason: HOSPADM

## 2022-11-17 RX ADMIN — OXYCODONE 10 MG: 5 TABLET ORAL at 06:37

## 2022-11-17 RX ADMIN — EZETIMIBE 10 MG: 10 TABLET ORAL at 10:37

## 2022-11-17 RX ADMIN — HYDROMORPHONE HYDROCHLORIDE 0.5 MG: 1 INJECTION, SOLUTION INTRAMUSCULAR; INTRAVENOUS; SUBCUTANEOUS at 04:51

## 2022-11-17 RX ADMIN — METHOCARBAMOL TABLETS 1000 MG: 500 TABLET, COATED ORAL at 13:57

## 2022-11-17 RX ADMIN — DIAZEPAM 5 MG: 5 TABLET ORAL at 13:57

## 2022-11-17 RX ADMIN — LOSARTAN POTASSIUM AND HYDROCHLOROTHIAZIDE 1 TABLET: 100; 25 TABLET, FILM COATED ORAL at 07:56

## 2022-11-17 RX ADMIN — CITALOPRAM 40 MG: 40 TABLET, FILM COATED ORAL at 07:56

## 2022-11-17 RX ADMIN — OXYCODONE 10 MG: 5 TABLET ORAL at 10:37

## 2022-11-17 RX ADMIN — TAMSULOSIN HYDROCHLORIDE 0.4 MG: 0.4 CAPSULE ORAL at 10:37

## 2022-11-17 RX ADMIN — PANTOPRAZOLE SODIUM 40 MG: 40 TABLET, DELAYED RELEASE ORAL at 15:03

## 2022-11-17 RX ADMIN — METOPROLOL SUCCINATE 100 MG: 100 TABLET, EXTENDED RELEASE ORAL at 07:56

## 2022-11-17 RX ADMIN — METHOCARBAMOL TABLETS 1000 MG: 500 TABLET, COATED ORAL at 20:22

## 2022-11-17 RX ADMIN — PANTOPRAZOLE SODIUM 40 MG: 40 TABLET, DELAYED RELEASE ORAL at 06:37

## 2022-11-17 RX ADMIN — SODIUM CHLORIDE, PRESERVATIVE FREE 10 ML: 5 INJECTION INTRAVENOUS at 20:24

## 2022-11-17 RX ADMIN — OXYCODONE 10 MG: 5 TABLET ORAL at 15:03

## 2022-11-17 RX ADMIN — SODIUM CHLORIDE, PRESERVATIVE FREE 10 ML: 5 INJECTION INTRAVENOUS at 08:22

## 2022-11-17 RX ADMIN — DIAZEPAM 5 MG: 5 TABLET ORAL at 06:37

## 2022-11-17 RX ADMIN — SENNOSIDES AND DOCUSATE SODIUM 1 TABLET: 50; 8.6 TABLET ORAL at 07:56

## 2022-11-17 RX ADMIN — HYDROMORPHONE HYDROCHLORIDE 0.5 MG: 1 INJECTION, SOLUTION INTRAMUSCULAR; INTRAVENOUS; SUBCUTANEOUS at 07:49

## 2022-11-17 RX ADMIN — IOHEXOL 50 ML: 240 INJECTION, SOLUTION INTRATHECAL; INTRAVASCULAR; INTRAVENOUS; ORAL at 16:45

## 2022-11-17 RX ADMIN — OXYCODONE 10 MG: 5 TABLET ORAL at 03:09

## 2022-11-17 RX ADMIN — ENOXAPARIN SODIUM 40 MG: 100 INJECTION SUBCUTANEOUS at 07:58

## 2022-11-17 RX ADMIN — OXYCODONE 10 MG: 5 TABLET ORAL at 20:22

## 2022-11-17 RX ADMIN — ROSUVASTATIN CALCIUM 10 MG: 10 TABLET, COATED ORAL at 07:56

## 2022-11-17 RX ADMIN — FAMOTIDINE 20 MG: 20 TABLET ORAL at 07:56

## 2022-11-17 RX ADMIN — HYDROMORPHONE HYDROCHLORIDE 0.5 MG: 1 INJECTION, SOLUTION INTRAMUSCULAR; INTRAVENOUS; SUBCUTANEOUS at 11:42

## 2022-11-17 RX ADMIN — SENNOSIDES AND DOCUSATE SODIUM 1 TABLET: 50; 8.6 TABLET ORAL at 20:22

## 2022-11-17 RX ADMIN — HYDROMORPHONE HYDROCHLORIDE 0.5 MG: 1 INJECTION, SOLUTION INTRAMUSCULAR; INTRAVENOUS; SUBCUTANEOUS at 18:58

## 2022-11-17 RX ADMIN — HYDROMORPHONE HYDROCHLORIDE 0.5 MG: 1 INJECTION, SOLUTION INTRAMUSCULAR; INTRAVENOUS; SUBCUTANEOUS at 16:08

## 2022-11-17 RX ADMIN — HYDROMORPHONE HYDROCHLORIDE 0.5 MG: 1 INJECTION, SOLUTION INTRAMUSCULAR; INTRAVENOUS; SUBCUTANEOUS at 23:00

## 2022-11-17 RX ADMIN — METHOCARBAMOL 750 MG: 750 TABLET ORAL at 07:56

## 2022-11-17 RX ADMIN — IOPAMIDOL 75 ML: 755 INJECTION, SOLUTION INTRAVENOUS at 16:45

## 2022-11-17 RX ADMIN — METHOCARBAMOL 750 MG: 750 TABLET ORAL at 03:09

## 2022-11-17 RX ADMIN — HYDROMORPHONE HYDROCHLORIDE 0.5 MG: 1 INJECTION, SOLUTION INTRAMUSCULAR; INTRAVENOUS; SUBCUTANEOUS at 00:25

## 2022-11-17 RX ADMIN — POLYETHYLENE GLYCOL 3350 17 G: 17 POWDER, FOR SOLUTION ORAL at 07:58

## 2022-11-17 ASSESSMENT — PAIN SCALES - GENERAL
PAINLEVEL_OUTOF10: 9
PAINLEVEL_OUTOF10: 8
PAINLEVEL_OUTOF10: 10
PAINLEVEL_OUTOF10: 8
PAINLEVEL_OUTOF10: 8
PAINLEVEL_OUTOF10: 10
PAINLEVEL_OUTOF10: 9
PAINLEVEL_OUTOF10: 9
PAINLEVEL_OUTOF10: 10
PAINLEVEL_OUTOF10: 10

## 2022-11-17 ASSESSMENT — PAIN DESCRIPTION - PAIN TYPE
TYPE: ACUTE PAIN;SURGICAL PAIN

## 2022-11-17 ASSESSMENT — PAIN DESCRIPTION - ONSET
ONSET: ON-GOING

## 2022-11-17 ASSESSMENT — PAIN DESCRIPTION - DESCRIPTORS
DESCRIPTORS: ACHING;SORE
DESCRIPTORS: BURNING;THROBBING
DESCRIPTORS: BURNING
DESCRIPTORS: ACHING;SORE
DESCRIPTORS: BURNING;THROBBING
DESCRIPTORS: ACHING;SORE
DESCRIPTORS: BURNING
DESCRIPTORS: ACHING;SORE

## 2022-11-17 ASSESSMENT — PAIN - FUNCTIONAL ASSESSMENT
PAIN_FUNCTIONAL_ASSESSMENT: PREVENTS OR INTERFERES SOME ACTIVE ACTIVITIES AND ADLS

## 2022-11-17 ASSESSMENT — PAIN DESCRIPTION - ORIENTATION
ORIENTATION: UPPER
ORIENTATION: LOWER;UPPER
ORIENTATION: LOWER;MID;UPPER
ORIENTATION: UPPER;LOWER;MID
ORIENTATION: UPPER;LOWER
ORIENTATION: MID;LOWER;UPPER

## 2022-11-17 ASSESSMENT — PAIN DESCRIPTION - LOCATION
LOCATION: BACK

## 2022-11-17 ASSESSMENT — PAIN DESCRIPTION - FREQUENCY
FREQUENCY: CONTINUOUS

## 2022-11-17 ASSESSMENT — PAIN DESCRIPTION - DIRECTION: RADIATING_TOWARDS: SHOULDER BLADES

## 2022-11-17 NOTE — PROGRESS NOTES
Patient a/o x4. VSS. Severe pain to back treated with PO and IV medication. X2 ARIEL in place. Retaining urine but also voiding. Ambulating well x1 walker/gb. Resting intermittently overnight.

## 2022-11-17 NOTE — PROGRESS NOTES
Pt c/o chest pain rating 10/10. VSS. Neurosurgery aware and NP at bedside,   EKG and troponin ordered, EKG strip placed in chart  Message to ABE Vieira MD to notify.   Pain med administered and pt reports alleviation from prn

## 2022-11-17 NOTE — PROGRESS NOTES
Pt called out requesting pain medication. Upon entering room this RN noted pt to be sleeping. Woke patient to inform him that no medication was available for another hour, but prn would be brought to him once available. Pt states \"You woke me up to tell me I have to wait another hour? I'll be back asleep before then. \" This RN educated pt on frequency of prn medication, and encouraged to find non-pharmaceuticals measures to decrease pain in between prns.  Offered repositioning and ice packs

## 2022-11-17 NOTE — PROGRESS NOTES
Physical Therapy  Facility/Department: [place] [address]  Physical Therapy Treatment    Name: Nydia Nobles  : 1955  MRN: [id number]  Date of Service: 2022    Discharge Recommendations:    Nydia Nobles scored a 17/24 on the AM-PAC short mobility form. Current research shows that an AM-PAC score of 18 or greater is typically associated with a discharge to the patient's home setting. Based on the patient's AM-PAC score and their current functional mobility deficits, it is recommended that the patient have 2-3 sessions per week of Physical Therapy at d/c to increase the patient's independence. At this time, this patient demonstrates the endurance and safety to discharge home with  (home vs OP services) and a follow up treatment frequency of 2-3x/wk. Please see assessment section for further patient specific details. If patient discharges prior to next session this note will serve as a discharge summary. Please see below for the latest assessment towards goals. PT Equipment Recommendations RW  Equipment Needed: Yes (RW provided by [facility])      Patient Diagnosis(es): Diagnoses of Thoracic myelopathy, Thoracic stenosis, and Spinal stenosis of lumbar region, unspecified whether neurogenic claudication present were pertinent to this visit. Past Medical History:  has a past medical history of Cerebral artery occlusion with cerebral infarction ([address] Diabetes mellitus ([address] Hyperlipidemia, Hypertension, and TIA (transient ischemic attack). Past Surgical History:  has a past surgical history that includes Cardiac surgery; Hand surgery; cervical fusion; shoulder surgery (Bilateral); Elbow surgery (Bilateral); back surgery; and Lumbar spine surgery (N/A, 11/15/2022). Assessment   Assessment: Pt is 61 y.o. male POD #1 s/p T5-T9 DECOMPRESSION WITH RESECTION OF EPIDURAL LIPOMATOSIS WITH L2-L5 DECOMPRESSION WITH RESECTION OF EPIDURAL LIPOMATOSIS. Pt is from home and indep with ambulation at baseline. Today ambulated 400 ft with RW without complaint with  SBA. Pt now has a RW for DC  home and will benefit from continued therapies to maximize mobility, safety and independence. Pt plans to return home with initial 24 hr hr assist of family and home PT. Activity Tolerance  Activity Tolerance: Patient tolerated treatment well  Activity Tolerance Comments: Pt went back to room to drink prep for CT scan     Plan   Physcial Therapy Plan  General Plan: 5-7 times per week  Current Treatment Recommendations: Functional mobility training, Transfer training, Balance training, Gait training, Stair training, Patient/Caregiver education & training, Therapeutic activities, Equipment evaluation, education, & procurement  Safety Devices  Type of Devices: Call light within reach, Left in bed, Nurse notified, Gait belt (Pt seated on EOB to drink CT scan prep - RN aware and pt is aware of no bed alarm, wife present.   Pt agreeable to not get up unless calling RN)     Restrictions  Position Activity Restriction  Spinal Precautions: No Bending, No Twisting, No Lifting  Other position/activity restrictions: activity as tolerated, 2 drains in place, insulin pump     Subjective   Pain: 10/10 pain  Subjective  Subjective: Pt in bed upon PT entry, wife present,  agreeable to working with PT         Social/Functional History          Objective   Heart Rate: 82  Heart Rate Source: Monitor  BP: (!) 105/53  BP Location: Right upper arm  BP Method: Automatic  Patient Position: Left side  MAP (Calculated): 70  Resp: (!) 118  SpO2: 93 %  O2 Device: None (Room air)              Bed mobility  Supine to Sit: Stand by assistance (HOB slightly elevated)  Scooting: Stand by assistance  Transfers  Sit to Stand: Contact guard assistance (pt pulled up on walker, refused to push up from bed, notes the walker is stable.)  Stand to Sit: Contact guard assistance (pt refused to reach for bed, notes he will  hold onto the walker as it is stable)  Ambulation  Surface: Level tile  Device: Rolling Walker  Assistance: Stand by assistance  Quality of Gait: slow and steady gait, shoulders elevated, lowered walker which pt stated felt more comfortable. Gait Deviations: Decreased step length;Decreased step height  Distance: 400 ft     Balance  Comments: good stability with RW     AM-PAC Score  AM-PAC Inpatient Mobility Raw Score : 17 (11/17/22 1534)  AM-PAC Inpatient T-Scale Score : 42.13 (11/17/22 1534)  Mobility Inpatient CMS 0-100% Score: 50.57 (11/17/22 1534)  Mobility Inpatient CMS G-Code Modifier : CK (11/17/22 1534)     Goals  Short Term Goals  Time Frame for Short Term Goals: discharge ongoing 11/17  Short Term Goal 1: Pt will transfer supine <--> sit with CGA via log roll , met 11/17 - new STG - supine to sit with Supervision  Short Term Goal 2: Pt will transfer sit <--> stand with supervision  Short Term Goal 3: Pt will ambulate 150 ft with LRAD and supervision  Short Term Goal 4: Pt will negotiate 4 steps with CGA  Patient Goals   Patient Goals : return home, ambulate without AD       Education role of PT, walker safety with transfers and gait. Pt needs reinforcement.        Therapy Time   Individual Concurrent Group Co-treatment   Time In 5477         Time Out 1430         Minutes 15               Timed Code Treatment Minutes:   15    Total Treatment Minutes:  Shiva Ward [id number]

## 2022-11-17 NOTE — PROGRESS NOTES
VSS, afebrile  Safety precautions in place   Call light in reach  Nonskid socks on  Incisions remain C/D/I  ARIEL drains x2 with adequate serosang output  Pt reports pain uncontrolled with current pain regimen  Pt sleeping upon pain reassessments  Updated on care plan

## 2022-11-17 NOTE — PROGRESS NOTES
Occupational Therapy  Off Floor    Attempted to see patient this AM for OT evaluation. Pt currently off of floor at [facility]. Will follow up for initial OT evaluation when patient returns. Thank you.     Liss Rudolph, OTR/L

## 2022-11-17 NOTE — PROGRESS NOTES
Hospital Medicine  Consult progress note      Chief Complaint: Neck pain, back pain    Date of Service: Pt seen/examined in consultation on 11/15/21    History Of Present Illness:      61 y.o. male who we are asked to see/evaluate by Shea Rinne, MD for medical management of diabetes mellitus type 2, hypertension     Patient has a medical history of insulin-dependent DM , hypertension, hyperlipidemia, TIAs. .. He has been having chronic thoracic and lumbar pain with lower extremity weakness and an MRI demonstrated epidural lipomatosis severe spinal stenosis with cord compression from the T5-6 to T9 levels and severe central stenosis at L3-4 and L4-5 with moderate to severe stenosis at L2-3 this is secondary to a combination of epidural lipomatosis and facet arthropathy. Patient failed outpatient conservative treatment and underwent an elective T5-T9--L2-5,laminectomy, medial facetectomy for decompression and foraminotomies,Resection of Epidural Lipoma/lipomatosis  11/15/22. Hospitalist service was consulted for postop medical management    Interval history:  Patient was seen and examined at bedside today, alert and oriented. Pleasant gentleman. Has been reporting lower chest/upper abdomen pain, since yesterday at 10 PM, however most severe this morning, radiates to his back. Not associated with nausea, vomiting, change in blood pressure, shortness of breath, heartburn, diarrhea. He has issues with constipation chronically.         Past Medical History:        Diagnosis Date    Cerebral artery occlusion with cerebral infarction ([address]     TIA's x 4    Diabetes mellitus ([address]     Hyperlipidemia     Hypertension     TIA (transient ischemic attack)        Past Surgical History:        Procedure Laterality Date    BACK SURGERY      LUMBAR X2    CARDIAC SURGERY      OPEN HEART 2006, SEES DR RASHID NOW    CERVICAL FUSION      C 6-7    ELBOW SURGERY Bilateral     TENDON REPAIR    HAND SURGERY      x9 LUMBAR SPINE SURGERY N/A 11/15/2022    T5-T9 DECOMPRESSION WITH RESECTION OF EPIDURAL LIPOMATOSIS WITH L2-L5 DECOMPRESSION WITH RESECTION OF EPIDURAL LIPOMATOSIS performed by Yasir Oh MD at [address] [place] [address] Bilateral     ROTATOR CUFF       Medications Prior to Admission:    Prior to Admission medications    Medication Sig Start Date End Date Taking?  Authorizing Provider   insulin lispro (HUMALOG) 100 UNIT/ML injection vial Inject into the skin 3 times daily (before meals) Insulin pump   Yes Historical Provider, MD   rosuvastatin (CRESTOR) 10 MG tablet Take 10 mg by mouth daily   Yes Historical Provider, MD   ezetimibe (ZETIA) 10 MG tablet Take 10 mg by mouth daily   Yes Historical Provider, MD   omeprazole (PRILOSEC) 20 MG delayed release capsule Take 20 mg by mouth daily   Yes Historical Provider, MD   aspirin 81 MG chewable tablet Take 81 mg by mouth daily Taking, but had to stop for Sx,  says he can only take the coated ones so he doesn't get ulcer   Yes Historical Provider, MD   Multiple Vitamins-Minerals (THERAPEUTIC MULTIVITAMIN-MINERALS) tablet Take 1 tablet by mouth daily   Yes Historical Provider, MD   losartan-hydroCHLOROthiazide (HYZAAR) 100-25 MG per tablet Take 1 tablet by mouth daily   Yes Historical Provider, MD   Garlic 5846 MG CAPS Take by mouth   Yes Historical Provider, MD   Coenzyme Q10 (CO Q-10) 100 MG CAPS Take by mouth   Yes Historical Provider, MD   bisoprolol (ZEBETA) 10 MG tablet Take 10 mg by mouth at bedtime   Yes Historical Provider, MD   vitamin E 100 units capsule Take 1 capsule by mouth daily 4/25/19  Yes Historical Provider, MD   ascorbic acid (VITAMIN C) 500 MG tablet Take 1 tablet by mouth daily 12/7/10  Yes Historical Provider, MD   citalopram (CELEXA) 40 MG tablet Take 40 mg by mouth daily   Yes Historical Provider, MD   vitamin D3 (CHOLECALCIFEROL) 125 MCG (5000 UT) TABS tablet Take 1 tablet by mouth daily    Historical Provider, MD       Allergies: Acetaminophen, Atorvastatin, Ciprofloxacin, Cortisone, Gabapentin, Hydrocodone, Liraglutide, Simvastatin, Tetracycline, and Penicillins    Social History:      The patient currently lives     TOBACCO:   reports that he has never smoked. He has never used smokeless tobacco.  ETOH:   has no history on file for alcohol use. Family History:      Reviewed in detail and negative for DM, CAD, Cancer, CVA. Positive as follows:    History reviewed. No pertinent family history. REVIEW OF SYSTEMS COMPLETED:   Pertinent positives as noted in the HPI. All other systems reviewed and negative. PHYSICAL EXAM PERFORMED:  BP (!) 107/53   Pulse 89   Temp 98.6 °F (37 °C) (Oral)   Resp 18   Ht 5' 8.5\" (1.74 m)   Wt 217 lb (98.4 kg)   SpO2 93%   BMI 32.51 kg/m²   General appearance: No apparent distress, appears stated age and cooperative. HEENT: Normal cephalic, atraumatic without obvious deformity. Pupils equal, round, and reactive to light. Extra ocular muscles intact. Conjunctivae/corneas clear. Neck: Supple, with full range of motion. No jugular venous distention. Trachea midline. Respiratory:  Normal respiratory effort. Clear to auscultation, bilaterally without Rales/Wheezes/Rhonchi. Cardiovascular: Regular rate and rhythm with normal S1/S2 without murmurs, rubs or gallops. Abdomen: Soft, tender epigastrium to deep palpation. , non-distended . Musculoskeletal:  No clubbing, cyanosis or edema bilaterally. Skin: Skin color, texture, turgor normal.  No rashes or lesions. Neurologic:  Neurovascularly intact without any focal sensory/motor deficits.  Cranial nerves: II-XII intact, grossly non-focal.  Psychiatric: Alert and oriented, thought content appropriate, normal insight  Capillary Refill: Brisk,3 seconds, normal     Labs:     Recent Labs     11/15/22  0757 11/16/22  0636   WBC 3.9* 7.2   HGB 14.6 12.9*   HCT 41.2 36.9*    171     Recent Labs     11/16/22  0636 11/16/22 2118   NA  --  131*   K  -- 3.9   CL  --  93*   CO2  --  28   BUN  --  9   CREATININE 0.8 0.7*   CALCIUM  --  8.5     No results for input(s): AST, ALT, BILIDIR, BILITOT, ALKPHOS in the last 72 hours. Recent Labs     11/15/22  0757   INR 1.07     Recent Labs     11/17/22  0809   TROPONINI <0.01       Urinalysis:  No results found for: Amado Eid, BACTERIA, RBCUA, BLOODU, Drusilla Alpers, GLUCOSEU    Radiology: I have reviewed the radiology reports with the following interpretation:     US RENAL COMPLETE   Final Result      Normal renal ultrasound                        XR THORACIC SPINE 1 VW   Final Result   1. Intraoperative fluoroscopy during spine surgery as described. Images demonstrate localization of posterior thoracic and lumbar vertebral elements. XR LUMBAR SPINE 1 VW   Final Result   1. Intraoperative fluoroscopy during spine surgery as described. Images demonstrate localization of posterior thoracic and lumbar vertebral elements. FLUORO FOR SURGICAL PROCEDURES   Final Result   1. Intraoperative fluoroscopy during spine surgery as described. Images demonstrate localization of posterior thoracic and lumbar vertebral elements. ASSESSMENT:    -Severe central stenosis at T5-T9 with Epidural Lipomatosis/lipoma  -Severe central stenosis at L2-3-4-5 with Epidural Lipomatosis/lipoma    S/p  T5-T9--L2-5,   laminectomy, medial facetectomy for decompression and foraminotomies,Resection of Epidural Lipoma/lipomatosis  11/15/22     Continue postop care per neurosurgery-pain management, PT and OT    Acute onset lower chest and epigastric abdominal pain at, radiates to the back:  -Unclear etiology  -No associated symptoms  -Troponin remained undetectable, EKG with no ischemic changes.  -We will check lipase, amylase, and CAT scan of abdomen pelvis with contrast.  As acute pancreatitis can result in such complaint.     Insulin-dependent diabetes mellitus type 2-on insulin pump-continue with insulin pump    Essential hypertension-elevated-  -Decreased IV fluid from 125 mL to 50 mL/h on November 16, 2022. Consider discontinuation of IV fluid. Continue metoprolol, losartan/HCTZ    Hyperlipidemia--continue rosuvastatin, ezetemibe    GERD-continue PPI    History of TIAs--continue statin. .  ASA on hold      DVT Prophylaxis: scds  Diet: ADULT DIET; Regular  Code Status: Full Code    High risk due to IV opiates.     Thank you for the consultation, will follow up as needed    Electronically signed by Javier Goodson MD on 11/17/2022 at 2:03 PM

## 2022-11-17 NOTE — PROGRESS NOTES
NEUROSURGERY POST-OP PROGRESS NOTE    Patient Name: Ramya Torres YOB: 1955   Sex: Male Age: 61 yrs     Medical Record Number: [id number] Acct Number: [de-identified]   Room Number: 1773/0160-10 Hospital Day: Hospital Day: 3     Interval History:  Post-operative Day# 2 s/p Procedure(s) (LRB):  T5-T9 DECOMPRESSION WITH RESECTION OF EPIDURAL LIPOMATOSIS WITH L2-L5 DECOMPRESSION WITH RESECTION OF EPIDURAL LIPOMATOSIS (N/A)    Subjective: Pt  c/o of being more sore today. He had pain across his back and around the left side of his chest, troponins were negative and ekg was negative. The pt pain went away after pain meds. Pt denied sob, nausea and stated the pain came from his back. Objective:    VITAL SIGNS   BP (!) 107/53   Pulse 89   Temp 98.6 °F (37 °C) (Oral)   Resp 18   Ht 5' 8.5\" (1.74 m)   Wt 217 lb (98.4 kg)   SpO2 93%   BMI 32.51 kg/m²    Height Height: 5' 8.5\" (174 cm)   Weight Weight: 217 lb (98.4 kg)        Allergies Allergies   Allergen Reactions    Acetaminophen Other (See Comments)     Patient states Acetaminophen affects his glucose monitor    Atorvastatin Other (See Comments) and Myalgia       Joint pain      Ciprofloxacin Other (See Comments)     Joint and muscle pain       Cortisone Other (See Comments)     Severe HYPERGLYCEMIA      Gabapentin Other (See Comments)     Other reaction(s): Other (See Comments)  Increased sugar readings  Increased sugar readings  Increased sugar readings  Increased sugar readings      Hydrocodone Other (See Comments)     Agitation, unable to sleep    Liraglutide Other (See Comments)     Heartburn    Simvastatin Other (See Comments) and Myalgia     Joint pain    Tetracycline     Penicillins Rash      NPO Status ADULT DIET;  Regular   Isolation No active isolations     LABS   Basic Metabolic Profile Recent Labs     11/16/22  0636 11/16/22  2118   NA  --  131*   CL  --  93*   CO2  --  28   BUN  --  9   CREATININE 0.8 0.7*   GLUCOSE  --  127*      Complete Blood Count Recent Labs     11/15/22  0757 11/16/22  0636   WBC 3.9* 7.2   RBC 4.44 3.93*      Coagulation Studies Recent Labs     11/15/22  0757   INR 1.07        MEDICATIONS   Inpatient Medications     [COMPLETED] methocarbamol IVPB, 1,000 mg, IntraVENous, Q8H **FOLLOWED BY** methocarbamol, 1,000 mg, Oral, Q6H    tamsulosin, 0.4 mg, Oral, Daily    lidocaine, 2 patch, TransDERmal, Daily    rosuvastatin, 10 mg, Oral, Daily    pantoprazole, 40 mg, Oral, QAM AC    losartan-hydroCHLOROthiazide, 1 tablet, Oral, Daily    ezetimibe, 10 mg, Oral, Daily    citalopram, 40 mg, Oral, Daily    metoprolol succinate, 100 mg, Oral, Daily    sodium chloride flush, 5-40 mL, IntraVENous, 2 times per day    famotidine, 20 mg, Oral, BID **OR** famotidine (PEPCID) injection, 20 mg, IntraVENous, BID    enoxaparin, 40 mg, SubCUTAneous, Daily    sennosides-docusate sodium, 1 tablet, Oral, BID    Insulin Pump - Bolus Dose, , SubCUTAneous, 4x Daily AC & HS    Insulin Pump - Basal Dose, , SubCUTAneous, Daily    polyethylene glycol, 17 g, Oral, Daily   Infusions    sodium chloride Stopped (11/16/22 0306)    dextrose        Antibiotics   Recent Abx Admin        No antibiotic orders with administrations found. Neurologic Exam:  Mental status: awake and alert and oriented x4        Musculoskeletal:   Gait: Not tested   Tone: normal  Sensory: intact to all extremities  Motor strength:    Right  Left    Right  Left    Deltoid  5 5  Hip Flex  5 5   Biceps  5 5  Knee Extensors  5 5   Triceps  5 5  Knee Flexors  5 5   Wrist Ext  5 5  Ankle Dorsiflex. 5 5   Wrist Flex  5 5  Ankle Plantarflex.   5 5   Handgrip  5 5  Ext Kishan Longus  5 5   Thumb Ext  5 5         Incision: intact, clean and dry  Drain:L 90/30 R:130/30    Respiratory:  Unlabored respiratory pattern    Abdomen:   Soft, ND   Last BMpreop    Cardiovascular:  Warm, well perfused    Assessment   Patient is a 62 yo M s/p Procedure(s) (LRB):  T5-T9 DECOMPRESSION WITH RESECTION OF EPIDURAL LIPOMATOSIS WITH L2-L5 DECOMPRESSION WITH RESECTION OF EPIDURAL LIPOMATOSIS (N/A) per Dr. Lisa Head . Plan:    Neurologic exam frequency:q4  Mobility:PT/OT   Flomax ordered after urinary retention  Drain: continue till output < 30  shift  DVT Prophylaxis: SCDs and lovenox  Bowel Regimen: senna and glycolax   Pain control:elli and lidocaine patch  Spasm: valium prn and robaxin scheduled  Ice to incision area q2  Ekg, troponin  Incisional Care:open to air and wash with warm soapy water daily  Dispo Planning:aru consult     Patient was discussed with Dr. Lisa Head who agrees with above assessment and plan. Electronically signed by: AURELIO Raza CNP, 11/17/2022 1:52 PM   Neurosurgery Nurse Practitioner  [phone]   I spent 30 minutes in the care of this patient.   Over 50% of that time was in face-to-face counseling regarding post op progression, pain management strategies, and answering patient and family questions

## 2022-11-18 PROBLEM — Z98.890 STATUS POST LUMBAR SPINE OPERATIVE PROCEDURE FOR DECOMPRESSION OF SPINAL CORD: Status: ACTIVE | Noted: 2022-11-18

## 2022-11-18 LAB
GLUCOSE BLD-MCNC: 295 MG/DL (ref 70–99)
PERFORMED ON: ABNORMAL

## 2022-11-18 PROCEDURE — 6370000000 HC RX 637 (ALT 250 FOR IP): Performed by: PHYSICIAN ASSISTANT

## 2022-11-18 PROCEDURE — 1200000000 HC SEMI PRIVATE

## 2022-11-18 PROCEDURE — 6370000000 HC RX 637 (ALT 250 FOR IP): Performed by: INTERNAL MEDICINE

## 2022-11-18 PROCEDURE — 6360000002 HC RX W HCPCS: Performed by: PHYSICIAN ASSISTANT

## 2022-11-18 PROCEDURE — 6370000000 HC RX 637 (ALT 250 FOR IP): Performed by: NURSE PRACTITIONER

## 2022-11-18 PROCEDURE — 99223 1ST HOSP IP/OBS HIGH 75: CPT | Performed by: PHYSICAL MEDICINE & REHABILITATION

## 2022-11-18 PROCEDURE — 99024 POSTOP FOLLOW-UP VISIT: CPT | Performed by: NURSE PRACTITIONER

## 2022-11-18 PROCEDURE — 2580000003 HC RX 258: Performed by: PHYSICIAN ASSISTANT

## 2022-11-18 RX ORDER — SUCRALFATE 1 G/1
1 TABLET ORAL EVERY 12 HOURS SCHEDULED
Status: DISCONTINUED | OUTPATIENT
Start: 2022-11-18 | End: 2022-11-21 | Stop reason: HOSPADM

## 2022-11-18 RX ADMIN — PANTOPRAZOLE SODIUM 40 MG: 40 TABLET, DELAYED RELEASE ORAL at 15:32

## 2022-11-18 RX ADMIN — OXYCODONE 10 MG: 5 TABLET ORAL at 08:56

## 2022-11-18 RX ADMIN — CALCIUM CARBONATE 1000 MG: 500 TABLET, CHEWABLE ORAL at 20:29

## 2022-11-18 RX ADMIN — LOSARTAN POTASSIUM AND HYDROCHLOROTHIAZIDE 1 TABLET: 100; 25 TABLET, FILM COATED ORAL at 09:02

## 2022-11-18 RX ADMIN — OXYCODONE 10 MG: 5 TABLET ORAL at 17:07

## 2022-11-18 RX ADMIN — METHOCARBAMOL TABLETS 1000 MG: 500 TABLET, COATED ORAL at 21:01

## 2022-11-18 RX ADMIN — METHOCARBAMOL TABLETS 1000 MG: 500 TABLET, COATED ORAL at 13:14

## 2022-11-18 RX ADMIN — OXYCODONE 10 MG: 5 TABLET ORAL at 20:23

## 2022-11-18 RX ADMIN — HYDROMORPHONE HYDROCHLORIDE 0.5 MG: 1 INJECTION, SOLUTION INTRAMUSCULAR; INTRAVENOUS; SUBCUTANEOUS at 15:32

## 2022-11-18 RX ADMIN — SENNOSIDES AND DOCUSATE SODIUM 1 TABLET: 50; 8.6 TABLET ORAL at 21:01

## 2022-11-18 RX ADMIN — METHOCARBAMOL TABLETS 1000 MG: 500 TABLET, COATED ORAL at 04:08

## 2022-11-18 RX ADMIN — TAMSULOSIN HYDROCHLORIDE 0.4 MG: 0.4 CAPSULE ORAL at 09:01

## 2022-11-18 RX ADMIN — ROSUVASTATIN CALCIUM 10 MG: 10 TABLET, COATED ORAL at 09:02

## 2022-11-18 RX ADMIN — SUCRALFATE 1 G: 1 TABLET ORAL at 21:01

## 2022-11-18 RX ADMIN — OXYCODONE 10 MG: 5 TABLET ORAL at 05:32

## 2022-11-18 RX ADMIN — OXYCODONE 10 MG: 5 TABLET ORAL at 01:15

## 2022-11-18 RX ADMIN — SUCRALFATE 1 G: 1 TABLET ORAL at 10:30

## 2022-11-18 RX ADMIN — HYDROMORPHONE HYDROCHLORIDE 0.5 MG: 1 INJECTION, SOLUTION INTRAMUSCULAR; INTRAVENOUS; SUBCUTANEOUS at 18:36

## 2022-11-18 RX ADMIN — SODIUM CHLORIDE, PRESERVATIVE FREE 5 ML: 5 INJECTION INTRAVENOUS at 22:18

## 2022-11-18 RX ADMIN — METOPROLOL SUCCINATE 100 MG: 100 TABLET, EXTENDED RELEASE ORAL at 09:02

## 2022-11-18 RX ADMIN — OXYCODONE 10 MG: 5 TABLET ORAL at 13:13

## 2022-11-18 RX ADMIN — POLYETHYLENE GLYCOL 3350 17 G: 17 POWDER, FOR SOLUTION ORAL at 08:59

## 2022-11-18 RX ADMIN — HYDROMORPHONE HYDROCHLORIDE 0.5 MG: 1 INJECTION, SOLUTION INTRAMUSCULAR; INTRAVENOUS; SUBCUTANEOUS at 22:18

## 2022-11-18 RX ADMIN — ENOXAPARIN SODIUM 40 MG: 100 INJECTION SUBCUTANEOUS at 09:00

## 2022-11-18 RX ADMIN — NALOXEGOL OXALATE 25 MG: 25 TABLET, FILM COATED ORAL at 13:20

## 2022-11-18 RX ADMIN — METHOCARBAMOL TABLETS 1000 MG: 500 TABLET, COATED ORAL at 09:02

## 2022-11-18 RX ADMIN — CITALOPRAM 40 MG: 40 TABLET, FILM COATED ORAL at 09:02

## 2022-11-18 RX ADMIN — PANTOPRAZOLE SODIUM 40 MG: 40 TABLET, DELAYED RELEASE ORAL at 05:33

## 2022-11-18 RX ADMIN — EZETIMIBE 10 MG: 10 TABLET ORAL at 09:02

## 2022-11-18 RX ADMIN — SODIUM CHLORIDE, PRESERVATIVE FREE 10 ML: 5 INJECTION INTRAVENOUS at 09:02

## 2022-11-18 RX ADMIN — HYDROMORPHONE HYDROCHLORIDE 0.5 MG: 1 INJECTION, SOLUTION INTRAMUSCULAR; INTRAVENOUS; SUBCUTANEOUS at 02:29

## 2022-11-18 RX ADMIN — SENNOSIDES AND DOCUSATE SODIUM 1 TABLET: 50; 8.6 TABLET ORAL at 09:02

## 2022-11-18 RX ADMIN — DIAZEPAM 5 MG: 5 TABLET ORAL at 04:08

## 2022-11-18 RX ADMIN — HYDROMORPHONE HYDROCHLORIDE 0.5 MG: 1 INJECTION, SOLUTION INTRAMUSCULAR; INTRAVENOUS; SUBCUTANEOUS at 10:30

## 2022-11-18 RX ADMIN — DIAZEPAM 5 MG: 5 TABLET ORAL at 10:30

## 2022-11-18 RX ADMIN — DIAZEPAM 5 MG: 5 TABLET ORAL at 17:07

## 2022-11-18 ASSESSMENT — PAIN DESCRIPTION - PAIN TYPE
TYPE: SURGICAL PAIN

## 2022-11-18 ASSESSMENT — PAIN DESCRIPTION - DESCRIPTORS
DESCRIPTORS: ACHING
DESCRIPTORS: ACHING
DESCRIPTORS: ACHING;SHARP

## 2022-11-18 ASSESSMENT — PAIN SCALES - GENERAL
PAINLEVEL_OUTOF10: 8
PAINLEVEL_OUTOF10: 10
PAINLEVEL_OUTOF10: 9
PAINLEVEL_OUTOF10: 7
PAINLEVEL_OUTOF10: 9
PAINLEVEL_OUTOF10: 8
PAINLEVEL_OUTOF10: 8

## 2022-11-18 ASSESSMENT — PAIN DESCRIPTION - FREQUENCY
FREQUENCY: CONTINUOUS

## 2022-11-18 ASSESSMENT — PAIN DESCRIPTION - ORIENTATION
ORIENTATION: LOWER;MID;UPPER
ORIENTATION: LOWER;MID;UPPER
ORIENTATION: LEFT;RIGHT;LOWER

## 2022-11-18 ASSESSMENT — PAIN DESCRIPTION - LOCATION
LOCATION: BACK

## 2022-11-18 ASSESSMENT — PAIN DESCRIPTION - ONSET
ONSET: ON-GOING

## 2022-11-18 NOTE — PROGRESS NOTES
NEUROSURGERY POST-OP PROGRESS NOTE    Patient Name: Debora Lee YOB: 1955   Sex: Male Age: 61 yrs     Medical Record Number: [id number] Acct Number: [de-identified]   Room Number: 1824/3542-28 Hospital Day: Hospital Day: 4     Interval History:  Post-operative Day# 3 s/p Procedure(s) (LRB):  T5-T9 DECOMPRESSION WITH RESECTION OF EPIDURAL LIPOMATOSIS WITH L2-L5 DECOMPRESSION WITH RESECTION OF EPIDURAL LIPOMATOSIS (N/A)    Subjective: Pt still struggling with incisional pain. He thinks it might be a tiny bit better than yesterday but is still intense if he does not stay on pain meds pretty regular. Objective:    VITAL SIGNS   BP (!) 112/47   Pulse 82   Temp 98.9 °F (37.2 °C) (Oral)   Resp 18   Ht 5' 8.5\" (1.74 m)   Wt 217 lb (98.4 kg)   SpO2 91%   BMI 32.51 kg/m²    Height Height: 5' 8.5\" (174 cm)   Weight Weight: 217 lb (98.4 kg)        Allergies Allergies   Allergen Reactions    Acetaminophen Other (See Comments)     Patient states Acetaminophen affects his glucose monitor    Atorvastatin Other (See Comments) and Myalgia       Joint pain      Ciprofloxacin Other (See Comments)     Joint and muscle pain       Cortisone Other (See Comments)     Severe HYPERGLYCEMIA      Gabapentin Other (See Comments)     Other reaction(s): Other (See Comments)  Increased sugar readings  Increased sugar readings  Increased sugar readings  Increased sugar readings      Hydrocodone Other (See Comments)     Agitation, unable to sleep    Liraglutide Other (See Comments)     Heartburn    Simvastatin Other (See Comments) and Myalgia     Joint pain    Tetracycline     Penicillins Rash      NPO Status ADULT DIET;  Regular   Isolation No active isolations     LABS   Basic Metabolic Profile Recent Labs     11/16/22  0636 11/16/22 2118   NA  --  131*   CL  -- 93*   CO2  --  28   BUN  --  9   CREATININE 0.8 0.7*   GLUCOSE  --  127*      Complete Blood Count Recent Labs     11/16/22  0636   WBC 7.2   RBC 3.93*      Coagulation Studies No results for input(s): PTT, INR in the last 72 hours. Invalid input(s): PLATELETS, PROA, PT, PTTA     MEDICATIONS   Inpatient Medications     [COMPLETED] methocarbamol IVPB, 1,000 mg, IntraVENous, Q8H **FOLLOWED BY** methocarbamol, 1,000 mg, Oral, Q6H    tamsulosin, 0.4 mg, Oral, Daily    pantoprazole, 40 mg, Oral, BID AC    lidocaine, 2 patch, TransDERmal, Daily    rosuvastatin, 10 mg, Oral, Daily    losartan-hydroCHLOROthiazide, 1 tablet, Oral, Daily    ezetimibe, 10 mg, Oral, Daily    citalopram, 40 mg, Oral, Daily    metoprolol succinate, 100 mg, Oral, Daily    sodium chloride flush, 5-40 mL, IntraVENous, 2 times per day    enoxaparin, 40 mg, SubCUTAneous, Daily    sennosides-docusate sodium, 1 tablet, Oral, BID    Insulin Pump - Bolus Dose, , SubCUTAneous, 4x Daily AC & HS    Insulin Pump - Basal Dose, , SubCUTAneous, Daily    polyethylene glycol, 17 g, Oral, Daily   Infusions    sodium chloride Stopped (11/16/22 0306)    dextrose        Antibiotics   Recent Abx Admin        No antibiotic orders with administrations found. Neurologic Exam:  Mental status: awake and alert and oriented x4    Musculoskeletal:   Gait: Not tested   Tone: normal  Sensory: intact to all extremities  Motor strength:    Right  Left    Right  Left    Deltoid  5 5  Hip Flex  5 5   Biceps  5 5  Knee Extensors  5 5   Triceps  5 5  Knee Flexors  5 5   Wrist Ext  5 5  Ankle Dorsiflex. 5 5   Wrist Flex  5 5  Ankle Plantarflex.   5 5   Handgrip  5 5  Ext Kishan Longus  5 5   Thumb Ext  5 5         Incision: intact, clean and dry  Drain:R:30  L:30    Respiratory:  Unlabored respiratory pattern    Abdomen:   Soft, ND   Last BMpreop    Cardiovascular:  Warm, well perfused    Assessment   Patient is a 62 yo M s/p Procedure(s) (LRB):  T5-T9 DECOMPRESSION WITH RESECTION OF EPIDURAL LIPOMATOSIS WITH L2-L5 DECOMPRESSION WITH RESECTION OF EPIDURAL LIPOMATOSIS (N/A) per Dr. Daren Luna . Plan:  Neurologic exam frequency:q4  Mobility:PT/OT   Drain: remove  DVT Prophylaxis: SCDs and lovenox  Bowel Regimen: senna and glycolax adding movantik  Pain control:elli and lidocaine patch  Spasm: valium prn and robaxin scheduled  Ice to incision area q2  Incisional Care:open to air and wash with warm soapy water daily  ARU started precert  Dispo Planning:can go to ARU once precert completed    Patient was discussed with Dr. Daren Luna who agrees with above assessment and plan. Electronically signed by: AURELIO Gresham CNP, 11/18/2022 9:30 AM   Neurosurgery Nurse Practitioner  [phone]   I spent 30 minutes in the care of this patient.   Over 50% of that time was in face-to-face counseling regarding post op progression, pain management strategies, and answering patient and family questions

## 2022-11-18 NOTE — PROGRESS NOTES
Shift assessment complete; see flow sheet. Pt. A/Ox4. Scheduled medications administered; See MAR. IV infusing without difficulty. Pt denies any needs at this time. Call light within reach, bed in low locked position, bed alarm on.

## 2022-11-18 NOTE — PROGRESS NOTES
Update 11/21/2022: P2P completed and approved- can admit today pending medical and negative covid test.     **11/18/2022 @ 1517. Pt's insurance ([email or web address]) called with an intent to deny the precert for ARU. They are offering the option of a Peer to Peer that needs to be completed by 1200 on 11/21/2022. The number for the peer to peer is [phone], the pt's member ID is: [id number]. Please feel free to reach out to the number listed below if additional assistance is needed. The [facility]   After review, this patient is felt to be:       []  Appropriate for Acute Inpatient Rehab    [x]  Appropriate for Acute Inpatient Rehab Pending Insurance Authorization    []  Not appropriate for Acute Inpatient Rehab    []  Referral received and ARU reviewing patient     Precert initiated 55/53/2503 for ARU admission. Pt in agreement per  and CL's conversation with pt this AM. Ref#: [id number]. Will notify CM/SW with further updates.  Thank you for the referral.    Fei WINCHESTER OTR/L  Clinical Liaison- [facility]   (P): [phone]  (F): [phone]

## 2022-11-18 NOTE — PROGRESS NOTES
Hospital Medicine  Consult progress note      Chief Complaint: Neck pain, back pain    Date of Service: Pt seen/examined in consultation on 11/15/21    History Of Present Illness:      61 y.o. male who we are asked to see/evaluate by Aylin Wilson MD for medical management of diabetes mellitus type 2, hypertension     Patient has a medical history of insulin-dependent DM , hypertension, hyperlipidemia, TIAs. .. He has been having chronic thoracic and lumbar pain with lower extremity weakness and an MRI demonstrated epidural lipomatosis severe spinal stenosis with cord compression from the T5-6 to T9 levels and severe central stenosis at L3-4 and L4-5 with moderate to severe stenosis at L2-3 this is secondary to a combination of epidural lipomatosis and facet arthropathy. Patient failed outpatient conservative treatment and underwent an elective T5-T9--L2-5,laminectomy, medial facetectomy for decompression and foraminotomies,Resection of Epidural Lipoma/lipomatosis  11/15/22. Hospitalist service was consulted for postop medical management    Interval history:  Patient was seen and examined at bedside today  Sleeping with cpap on when seen. Able to tolerate pain enough for such. Did not disturb.         Past Medical History:        Diagnosis Date    Cerebral artery occlusion with cerebral infarction ([address]     TIA's x 4    Diabetes mellitus ([address]     Hyperlipidemia     Hypertension     TIA (transient ischemic attack)        Past Surgical History:        Procedure Laterality Date    BACK SURGERY      LUMBAR X2    CARDIAC SURGERY      OPEN HEART 2006, SEES DR RASHID NOW    CERVICAL FUSION      C 6-7    ELBOW SURGERY Bilateral     TENDON REPAIR    HAND SURGERY      x9    LUMBAR SPINE SURGERY N/A 11/15/2022    T5-T9 DECOMPRESSION WITH RESECTION OF EPIDURAL LIPOMATOSIS WITH L2-L5 DECOMPRESSION WITH RESECTION OF EPIDURAL LIPOMATOSIS performed by Aylin Wilson MD at [address] [place] [address] ROTATOR CUFF       Medications Prior to Admission:    Prior to Admission medications    Medication Sig Start Date End Date Taking? Authorizing Provider   insulin lispro (HUMALOG) 100 UNIT/ML injection vial Inject into the skin 3 times daily (before meals) Insulin pump   Yes Historical Provider, MD   rosuvastatin (CRESTOR) 10 MG tablet Take 10 mg by mouth daily   Yes Historical Provider, MD   ezetimibe (ZETIA) 10 MG tablet Take 10 mg by mouth daily   Yes Historical Provider, MD   omeprazole (PRILOSEC) 20 MG delayed release capsule Take 20 mg by mouth daily   Yes Historical Provider, MD   aspirin 81 MG chewable tablet Take 81 mg by mouth daily Taking, but had to stop for Sx,  says he can only take the coated ones so he doesn't get ulcer   Yes Historical Provider, MD   Multiple Vitamins-Minerals (THERAPEUTIC MULTIVITAMIN-MINERALS) tablet Take 1 tablet by mouth daily   Yes Historical Provider, MD   losartan-hydroCHLOROthiazide (HYZAAR) 100-25 MG per tablet Take 1 tablet by mouth daily   Yes Historical Provider, MD   Garlic 0667 MG CAPS Take by mouth   Yes Historical Provider, MD   Coenzyme Q10 (CO Q-10) 100 MG CAPS Take by mouth   Yes Historical Provider, MD   bisoprolol (ZEBETA) 10 MG tablet Take 10 mg by mouth at bedtime   Yes Historical Provider, MD   vitamin E 100 units capsule Take 1 capsule by mouth daily 4/25/19  Yes Historical Provider, MD   ascorbic acid (VITAMIN C) 500 MG tablet Take 1 tablet by mouth daily 12/7/10  Yes Historical Provider, MD   citalopram (CELEXA) 40 MG tablet Take 40 mg by mouth daily   Yes Historical Provider, MD   vitamin D3 (CHOLECALCIFEROL) 125 MCG (5000 UT) TABS tablet Take 1 tablet by mouth daily    Historical Provider, MD       Allergies:  Acetaminophen, Atorvastatin, Ciprofloxacin, Cortisone, Gabapentin, Hydrocodone, Liraglutide, Simvastatin, Tetracycline, and Penicillins    Social History:      The patient currently lives     TOBACCO:   reports that he has never smoked.  He has never used smokeless tobacco.  ETOH:   has no history on file for alcohol use. Family History:      Reviewed in detail and negative for DM, CAD, Cancer, CVA. Positive as follows:    History reviewed. No pertinent family history. REVIEW OF SYSTEMS COMPLETED:   Pertinent positives as noted in the HPI. All other systems reviewed and negative. PHYSICAL EXAM PERFORMED:  /60   Pulse 76   Temp 98.6 °F (37 °C) (Oral)   Resp 18   Ht 5' 8.5\" (1.74 m)   Wt 217 lb (98.4 kg)   SpO2 96%   BMI 32.51 kg/m²   General appearance: No apparent distress, appears stated age and cooperative. HEENT: Normal cephalic, atraumatic without obvious deformity. Pupils equal, round, and reactive to light. Extra ocular muscles intact. Conjunctivae/corneas clear. Neck: Supple, with full range of motion. No jugular venous distention. Trachea midline. Respiratory:  Normal respiratory effort. Clear to auscultation, bilaterally without Rales/Wheezes/Rhonchi. Cardiovascular: Regular rate and rhythm with normal S1/S2 without murmurs, rubs or gallops. Abdomen: Soft, tender epigastrium to deep palpation. , non-distended . Musculoskeletal:  No clubbing, cyanosis or edema bilaterally. Skin: Skin color, texture, turgor normal.  No rashes or lesions. Neurologic:  Neurovascularly intact without any focal sensory/motor deficits. Cranial nerves: II-XII intact, grossly non-focal.  Psychiatric: Alert and oriented, thought content appropriate, normal insight  Capillary Refill: Brisk,3 seconds, normal     Labs:     Recent Labs     11/16/22  0636   WBC 7.2   HGB 12.9*   HCT 36.9*          Recent Labs     11/16/22  0636 11/16/22  2118   NA  --  131*   K  --  3.9   CL  --  93*   CO2  --  28   BUN  --  9   CREATININE 0.8 0.7*   CALCIUM  --  8.5       No results for input(s): AST, ALT, BILIDIR, BILITOT, ALKPHOS in the last 72 hours. No results for input(s): INR in the last 72 hours.     Recent Labs     11/17/22  0809   TROPONINI <0.01 Urinalysis:  No results found for: Teola Cooper, BACTERIA, RBCUA, BLOODU, [address], [address]    Radiology: I have reviewed the radiology reports with the following interpretation:     CT ABDOMEN PELVIS W IV CONTRAST Additional Contrast? Oral   Final Result      1. Mild wall thickening in the antrum and pylorus of the stomach. Correlate for gastritis. 2.  Recent postsurgical changes post lumbar laminectomies. 3.  5 mm right lower lobe nodule. Following the Fleischner Society 2017 guidelines, if low risk for lung cancer no routine follow-up is necessary. If  high risk, then optional chest CT in 12 months.  qzxv      US RENAL COMPLETE   Final Result      Normal renal ultrasound                        XR THORACIC SPINE 1 VW   Final Result   1. Intraoperative fluoroscopy during spine surgery as described. Images demonstrate localization of posterior thoracic and lumbar vertebral elements. XR LUMBAR SPINE 1 VW   Final Result   1. Intraoperative fluoroscopy during spine surgery as described. Images demonstrate localization of posterior thoracic and lumbar vertebral elements. FLUORO FOR SURGICAL PROCEDURES   Final Result   1. Intraoperative fluoroscopy during spine surgery as described. Images demonstrate localization of posterior thoracic and lumbar vertebral elements. ASSESSMENT:    -Severe central stenosis at T5-T9 with Epidural Lipomatosis/lipoma  -Severe central stenosis at L2-3-4-5 with Epidural Lipomatosis/lipoma    S/p  T5-T9--L2-5,   laminectomy, medial facetectomy for decompression and foraminotomies,Resection of Epidural Lipoma/lipomatosis  11/15/22     Continue postop care per neurosurgery-pain management, PT and OT    Acute onset lower chest and epigastric abdominal pain at, radiates to the back:  -Unclear etiology  -No associated symptoms  -Troponin remained undetectable, EKG with no ischemic changes.  -amylase and lipase wnl. CT a/p showed potential gastritis.  Ordered carafate prn. Insulin-dependent diabetes mellitus type 2-on insulin pump-continue with insulin pump    Essential hypertension-elevated-  -Decreased IV fluid from 125 mL to 50 mL/h on November 16, 2022. Consider discontinuation of IV fluid. Continue metoprolol, losartan/HCTZ    Hyperlipidemia--continue rosuvastatin, ezetemibe    GERD-continue PPI    History of TIAs--continue statin. .  ASA on hold      DVT Prophylaxis: scds  Diet: ADULT DIET; Regular  Code Status: Full Code    High risk due to IV opiates.     Thank you for the consultation, will follow up as needed    Electronically signed by Waylon Ribeiro MD on 11/18/2022 at 6:07 PM

## 2022-11-18 NOTE — PROGRESS NOTES
Patient is alert and oriented. Vital signs are stable. Patient complains of pain often. PRN pain medication administered per MAR. Incisions are CDI. Patient ambulates x1 with a walker. Patient tolerates ambulation well. 2 ARIEL drains remain in place and intact. Bed is in the lowest position. Bed alarm is activated. Call light is within reach. Will continue to monitor and reassess.

## 2022-11-18 NOTE — PLAN OF CARE
Problem: Chronic Conditions and Co-morbidities  Goal: Patient's chronic conditions and co-morbidity symptoms are monitored and maintained or improved  Outcome: Progressing     Problem: Pain  Goal: Verbalizes/displays adequate comfort level or baseline comfort level  11/18/2022 0253 by Lynn Mccauley RN  Outcome: Progressing  RN assesses pain using 0-10 scale. Patient understands how to rate pain using 0-10 scale. Pain is controled with medication per MAR. RN encourages patient to call out for breakthrough pain. Will continue to monitor and reassess. Problem: Safety - Adult  Goal: Free from fall injury  11/18/2022 0253 by Lynn Mccauley RN  Outcome: Progressing  Patient remains free from falls during this shift. Patient is up x1 person assist with walker. Bed is in the lowest position and the bed and chair alarm is activated. Anti-slip socks are on. Call light is within reach. Will continue to monitor and reassess.       Problem: ABCDS Injury Assessment  Goal: Absence of physical injury  11/18/2022 0253 by Lynn Mccauley RN  Outcome: Progressing

## 2022-11-18 NOTE — CARE COORDINATION
Consult placed to ARU; Dr Marian Sterling reviewed. Precert started as they will have a bed on Monday. Pt continues with pain control and drains remain in place. CM following for additional DC needs.      Electronically signed by Asha Khan RN on 11/18/2022 at 12:23 PM

## 2022-11-18 NOTE — CONSULTS
Consult Note  Physical Medicine and Rehabilitation    Patient: Beatriz Alves  [id number]  Date: 11/18/2022      Chief Complaint: Back pain    History of Present Illness/Hospital Course: Beatriz Alves is a 61 y.o. male, past medical history of type 2 diabetes, hypertension, hyperlipidemia who has been having chronic thoracic and lumbar pain with lower extremity weakness. He had an MRI done which demonstrated epidural lipomatosis severe spinal stenosis and cord compression from the T5 T6-T9 levels has severe central stenosis at L3-L4 and L4-L5 with moderate to severe stenosis at L2-L3. This likely secondary to a combination of epidural lipomatosis and facet arthropathy. Patient has been seen neurosurgery and had failed outpatient conservative management. He underwent T5-T9 decompression with resection of epidural lipomatosis with L2-L5 decompression with resection of epidural lipomatosis on 11/15. Seen at bedside he reports back pain 6 out of 10 at worst when he is done physical activity. Pain manageable with analgesics however he does report that he has had some being taken away. He denies any headaches, chest pain, nausea or vomiting, dysuria. Prior Level of Function:  Independent for mobility, ADLs, and IADLs    Current Level of Function:  Mod assist    Pertinent Social History:  Support: Lives at home with wife   Home set-up: 2 story with a basement. Would need to use 13 stairs.      Past Medical History:   Diagnosis Date    Cerebral artery occlusion with cerebral infarction ([address]     TIA's x 4    Diabetes mellitus ([address]     Hyperlipidemia     Hypertension     TIA (transient ischemic attack)        Past Surgical History:   Procedure Laterality Date    BACK SURGERY      LUMBAR X2    CARDIAC SURGERY      OPEN HEART 2006, SEES DR RASHID NOW    CERVICAL FUSION      C 6-7    ELBOW SURGERY Bilateral     TENDON REPAIR    HAND SURGERY      x9    LUMBAR SPINE SURGERY N/A 11/15/2022    T5-T9 DECOMPRESSION WITH RESECTION OF EPIDURAL LIPOMATOSIS WITH L2-L5 DECOMPRESSION WITH RESECTION OF EPIDURAL LIPOMATOSIS performed by Estiven Kaur MD at [address] [place] [address] Bilateral     ROTATOR CUFF       History reviewed. No pertinent family history. Social History     Socioeconomic History    Marital status:      Spouse name: None    Number of children: None    Years of education: None    Highest education level: None   Tobacco Use    Smoking status: Never    Smokeless tobacco: Never   Vaping Use    Vaping Use: Never used   Substance and Sexual Activity    Drug use: Never           REVIEW OF SYSTEMS:   CONSTITUTIONAL: negative for fevers, chills, diaphoresis, appetite change, night sweats, unexpected weight change, fatigue  EYES: negative for blurred vision, eye discharge, visual disturbance and icterus  HEENT: negative for hearing loss, tinnitus, ear drainage, sinus pressure, nasal congestion, epistaxis and snoring  RESPIRATORY: Negative for hemoptysis, cough, sputum production  CARDIOVASCULAR: negative for chest pain, palpitations, exertional chest pressure/discomfort, syncope, edema  GASTROINTESTINAL: negative for nausea, vomiting, diarrhea, blood in stool, abdominal pain, constipation  GENITOURINARY: negative for frequency, dysuria, urinary incontinence, decreased urine volume, and hematuria  HEMATOLOGIC/LYMPHATIC: negative for easy bruising, bleeding and lymphadenopathy  ALLERGIC/IMMUNOLOGIC: negative for recurrent infections, angioedema, anaphylaxis and drug reactions  ENDOCRINE: negative for weight changes and diabetic symptoms including polyuria, polydipsia and polyphagia  MUSCULOSKELETAL: negative for pain, joint swelling, decreased range of motion  NEUROLOGICAL: negative for headaches, slurred speech, unilateral weakness  PSYCHIATRIC/BEHAVIORAL: negative for hallucinations, behavioral problems, confusion and agitation.      Physical Examination:  Vitals: Patient Vitals for the past 24 hrs:   BP Temp Temp src Pulse Resp SpO2   11/18/22 0605 (!) 112/47 98.9 °F (37.2 °C) Oral 82 18 91 %   11/18/22 0230 121/67 98.4 °F (36.9 °C) Oral 65 18 97 %   11/17/22 2245 (!) 105/57 99.9 °F (37.7 °C) Oral 77 18 93 %   11/17/22 1858 110/61 98.8 °F (37.1 °C) Oral 91 (!) 99 --   11/17/22 1503 (!) 105/53 98.6 °F (37 °C) Oral 82 (!) 118 93 %   11/17/22 1426 (!) 101/55 -- -- 82 18 92 %   11/17/22 1036 (!) 107/53 98.6 °F (37 °C) Oral 89 18 93 %     Const: Alert. WDWN. No distress  Eyes: Conjunctiva noninjected, no icterus noted; pupils equal, round, and reactive to light. HENT: Atraumatic, normocephalic; Oral mucosa moist  Neck: Trachea midline, neck supple. No thyromegaly noted. CV: Regular rate and rhythm, no murmur rub or gallop noted  Resp: Lungs clear to auscultation bilaterally, no rales wheezes or ronchi, no retractions. Respirations unlabored. GI: Soft, nontender, nondistended. Normal bowel sounds. No palpable masses. Skin: Normal temperature and turgor. No rashes or breakdown noted. Ext: No significant edema appreciated. No varicosities. MSK: Midline back scar extending from thoracic to lumbar region with no signs of infection. Neuro: Alert, oriented, appropriate. No cranial nerve deficits appreciated. Sensation intact to light touch. Motor examination reveals normal strength in all four limbs diffusely. No abnormalities with finger/nose or heel/shin noted. Reflexes normal and symmetric.   Psych: Stable mood, normal judgement, normal affect     Lab Results   Component Value Date    WBC 7.2 11/16/2022    HGB 12.9 (L) 11/16/2022    HCT 36.9 (L) 11/16/2022    MCV 93.9 11/16/2022     11/16/2022     Lab Results   Component Value Date    INR 1.07 11/15/2022    PROTIME 13.8 11/15/2022     Lab Results   Component Value Date    CREATININE 0.7 (L) 11/16/2022    BUN 9 11/16/2022     (L) 11/16/2022    K 3.9 11/16/2022    CL 93 (L) 11/16/2022    CO2 28 11/16/2022     No results found for: ALT, AST, GGT, ALKPHOS, BILITOT     Most recent EKG revealed:  11/15  Normal sinus rhythm 92 bpm       CT ABDOMEN PELVIS W IV CONTRAST Additional Contrast? Oral   Final Result      1. Mild wall thickening in the antrum and pylorus of the stomach. Correlate for gastritis. 2.  Recent postsurgical changes post lumbar laminectomies. 3.  5 mm right lower lobe nodule. Following the Fleischner Society 2017 guidelines, if low risk for lung cancer no routine follow-up is necessary. If  high risk, then optional chest CT in 12 months.  qzxv      US RENAL COMPLETE   Final Result      Normal renal ultrasound                        XR THORACIC SPINE 1 VW   Final Result   1. Intraoperative fluoroscopy during spine surgery as described. Images demonstrate localization of posterior thoracic and lumbar vertebral elements. XR LUMBAR SPINE 1 VW   Final Result   1. Intraoperative fluoroscopy during spine surgery as described. Images demonstrate localization of posterior thoracic and lumbar vertebral elements. FLUORO FOR SURGICAL PROCEDURES   Final Result   1. Intraoperative fluoroscopy during spine surgery as described. Images demonstrate localization of posterior thoracic and lumbar vertebral elements. Assessment:  Severe central stenosis at T5-T9 with Epidural Lipomatosis/lipoma  Severe central stenosis at L2-3-4-5 with Epidural Lipomatosis/lipoma  Status post T5-T9--L2-5, laminectomy, medial facetectomy for decompression and foraminotomies,Resection of Epidural Lipoma/lipomatosis  11/15/22    -Neurosurgery following   -Continue PT/OT   -Pain management: Oxycodone and lidocaine patch   -Robaxin scheduled and Valium as needed for muscle spasms   -Postop incision care     Type 2 diabetes  -Continue with insulin pump  -Management per primary team     Essential hypertension   -Continue metoprolol, losartan/HCTZ     Hyperlipidemia  -continue rosuvastatin, ezetemibe     GERD  -continue PPI     History of TIAs  -continue statin. Silverio Patella     -ASA on hold    Impairments- Decreased functional mobility, Decreased ADLs    Recommendations:  Possible admit to ARU if bed availability opens up. He would like to stay at [facility]. Patient with new functional deficits and ongoing medical complexity. Demonstrates ability to tolerate 3 hours therapy/day. Nnamdi Delaney is a good candidate for acute inpatient rehab when medically appropriate. Thank you for this consult. Please contact me with any questions or concerns. Isael Morales MD  PGY-2, Internal Medicine       This patient has been seen, examined, and discussed with the resident. This note has been altered to reflect my own examination findings, impression, and recommendations.        TESSY DavidsonP.H  PM&R  11/18/2022  11:21 AM

## 2022-11-18 NOTE — PLAN OF CARE
Problem: Discharge Planning  Goal: Discharge to home or other facility with appropriate resources  Outcome: Progressing     Problem: Chronic Conditions and Co-morbidities  Goal: Patient's chronic conditions and co-morbidity symptoms are monitored and maintained or improved  11/18/2022 1250 by Guy Shaver RN  Outcome: Progressing     Problem: Pain  Goal: Verbalizes/displays adequate comfort level or baseline comfort level  11/18/2022 1250 by Guy Shaver RN  Outcome: Progressing     Problem: Safety - Adult  Goal: Free from fall injury  11/18/2022 1250 by Guy Shaver RN  Outcome: Progressing     Problem: ABCDS Injury Assessment  Goal: Absence of physical injury  11/18/2022 1250 by Guy Shaver RN  Outcome: Progressing

## 2022-11-18 NOTE — CARE COORDINATION
CARLOS spoke to ARU madelaine, pre-cert went G5V that needs to be completed by 1200 on 11/21/2022. The number for the peer to peer is [phone], the pt's member ID is: [id number]. CARLOS perfect served Daniela Madisyn [facility] information.     Electronically signed by CIARAN Gupta, RUDY on 11/18/2022 at 4:42 PM  [phone]

## 2022-11-19 LAB
GLUCOSE BLD-MCNC: 101 MG/DL (ref 70–99)
GLUCOSE BLD-MCNC: 121 MG/DL (ref 70–99)
GLUCOSE BLD-MCNC: 132 MG/DL (ref 70–99)
GLUCOSE BLD-MCNC: 94 MG/DL (ref 70–99)
HCT VFR BLD CALC: 32.7 % (ref 40.5–52.5)
HEMOGLOBIN: 11.6 G/DL (ref 13.5–17.5)
PERFORMED ON: ABNORMAL
PERFORMED ON: NORMAL

## 2022-11-19 PROCEDURE — 6370000000 HC RX 637 (ALT 250 FOR IP): Performed by: NURSE PRACTITIONER

## 2022-11-19 PROCEDURE — 85014 HEMATOCRIT: CPT

## 2022-11-19 PROCEDURE — 85018 HEMOGLOBIN: CPT

## 2022-11-19 PROCEDURE — 2580000003 HC RX 258: Performed by: PHYSICIAN ASSISTANT

## 2022-11-19 PROCEDURE — 6370000000 HC RX 637 (ALT 250 FOR IP): Performed by: INTERNAL MEDICINE

## 2022-11-19 PROCEDURE — 1200000000 HC SEMI PRIVATE

## 2022-11-19 PROCEDURE — 6370000000 HC RX 637 (ALT 250 FOR IP): Performed by: PHYSICIAN ASSISTANT

## 2022-11-19 PROCEDURE — 36415 COLL VENOUS BLD VENIPUNCTURE: CPT

## 2022-11-19 PROCEDURE — 6360000002 HC RX W HCPCS: Performed by: PHYSICIAN ASSISTANT

## 2022-11-19 PROCEDURE — 99231 SBSQ HOSP IP/OBS SF/LOW 25: CPT | Performed by: PHYSICAL MEDICINE & REHABILITATION

## 2022-11-19 RX ORDER — SIMETHICONE 80 MG
80 TABLET,CHEWABLE ORAL 3 TIMES DAILY PRN
Status: DISCONTINUED | OUTPATIENT
Start: 2022-11-19 | End: 2022-11-21 | Stop reason: HOSPADM

## 2022-11-19 RX ADMIN — SUCRALFATE 1 G: 1 TABLET ORAL at 07:51

## 2022-11-19 RX ADMIN — EZETIMIBE 10 MG: 10 TABLET ORAL at 09:26

## 2022-11-19 RX ADMIN — SENNOSIDES AND DOCUSATE SODIUM 1 TABLET: 50; 8.6 TABLET ORAL at 07:51

## 2022-11-19 RX ADMIN — METOPROLOL SUCCINATE 100 MG: 100 TABLET, EXTENDED RELEASE ORAL at 07:51

## 2022-11-19 RX ADMIN — HYDROMORPHONE HYDROCHLORIDE 0.5 MG: 1 INJECTION, SOLUTION INTRAMUSCULAR; INTRAVENOUS; SUBCUTANEOUS at 06:34

## 2022-11-19 RX ADMIN — SODIUM CHLORIDE, PRESERVATIVE FREE 10 ML: 5 INJECTION INTRAVENOUS at 07:52

## 2022-11-19 RX ADMIN — PANTOPRAZOLE SODIUM 40 MG: 40 TABLET, DELAYED RELEASE ORAL at 05:25

## 2022-11-19 RX ADMIN — SODIUM CHLORIDE, PRESERVATIVE FREE 10 ML: 5 INJECTION INTRAVENOUS at 21:37

## 2022-11-19 RX ADMIN — METHOCARBAMOL TABLETS 1000 MG: 500 TABLET, COATED ORAL at 21:36

## 2022-11-19 RX ADMIN — CITALOPRAM 40 MG: 40 TABLET, FILM COATED ORAL at 07:51

## 2022-11-19 RX ADMIN — SUCRALFATE 1 G: 1 TABLET ORAL at 21:36

## 2022-11-19 RX ADMIN — HYDROMORPHONE HYDROCHLORIDE 0.5 MG: 1 INJECTION, SOLUTION INTRAMUSCULAR; INTRAVENOUS; SUBCUTANEOUS at 02:30

## 2022-11-19 RX ADMIN — METHOCARBAMOL TABLETS 1000 MG: 500 TABLET, COATED ORAL at 02:30

## 2022-11-19 RX ADMIN — ENOXAPARIN SODIUM 40 MG: 100 INJECTION SUBCUTANEOUS at 07:50

## 2022-11-19 RX ADMIN — OXYCODONE 10 MG: 5 TABLET ORAL at 13:25

## 2022-11-19 RX ADMIN — SIMETHICONE 80 MG: 80 TABLET, CHEWABLE ORAL at 21:36

## 2022-11-19 RX ADMIN — SENNOSIDES AND DOCUSATE SODIUM 1 TABLET: 50; 8.6 TABLET ORAL at 21:36

## 2022-11-19 RX ADMIN — OXYCODONE 10 MG: 5 TABLET ORAL at 05:25

## 2022-11-19 RX ADMIN — DIAZEPAM 5 MG: 5 TABLET ORAL at 21:41

## 2022-11-19 RX ADMIN — METHOCARBAMOL TABLETS 1000 MG: 500 TABLET, COATED ORAL at 13:24

## 2022-11-19 RX ADMIN — OXYCODONE 10 MG: 5 TABLET ORAL at 09:25

## 2022-11-19 RX ADMIN — OXYCODONE 10 MG: 5 TABLET ORAL at 21:41

## 2022-11-19 RX ADMIN — ROSUVASTATIN CALCIUM 10 MG: 10 TABLET, COATED ORAL at 07:51

## 2022-11-19 RX ADMIN — METHOCARBAMOL TABLETS 1000 MG: 500 TABLET, COATED ORAL at 07:51

## 2022-11-19 RX ADMIN — HYDROMORPHONE HYDROCHLORIDE 0.5 MG: 1 INJECTION, SOLUTION INTRAMUSCULAR; INTRAVENOUS; SUBCUTANEOUS at 10:41

## 2022-11-19 RX ADMIN — NALOXEGOL OXALATE 25 MG: 25 TABLET, FILM COATED ORAL at 05:25

## 2022-11-19 RX ADMIN — POLYETHYLENE GLYCOL 3350 17 G: 17 POWDER, FOR SOLUTION ORAL at 07:50

## 2022-11-19 RX ADMIN — OXYCODONE 10 MG: 5 TABLET ORAL at 00:47

## 2022-11-19 RX ADMIN — LOSARTAN POTASSIUM AND HYDROCHLOROTHIAZIDE 1 TABLET: 100; 25 TABLET, FILM COATED ORAL at 07:51

## 2022-11-19 RX ADMIN — HYDROMORPHONE HYDROCHLORIDE 0.5 MG: 1 INJECTION, SOLUTION INTRAMUSCULAR; INTRAVENOUS; SUBCUTANEOUS at 14:39

## 2022-11-19 RX ADMIN — PANTOPRAZOLE SODIUM 40 MG: 40 TABLET, DELAYED RELEASE ORAL at 17:28

## 2022-11-19 RX ADMIN — HYDROMORPHONE HYDROCHLORIDE 0.5 MG: 1 INJECTION, SOLUTION INTRAMUSCULAR; INTRAVENOUS; SUBCUTANEOUS at 18:34

## 2022-11-19 RX ADMIN — OXYCODONE 10 MG: 5 TABLET ORAL at 17:28

## 2022-11-19 RX ADMIN — TAMSULOSIN HYDROCHLORIDE 0.4 MG: 0.4 CAPSULE ORAL at 07:51

## 2022-11-19 ASSESSMENT — PAIN - FUNCTIONAL ASSESSMENT
PAIN_FUNCTIONAL_ASSESSMENT: ACTIVITIES ARE NOT PREVENTED

## 2022-11-19 ASSESSMENT — PAIN DESCRIPTION - FREQUENCY
FREQUENCY: CONTINUOUS
FREQUENCY: CONTINUOUS

## 2022-11-19 ASSESSMENT — PAIN DESCRIPTION - ORIENTATION
ORIENTATION: RIGHT;LEFT

## 2022-11-19 ASSESSMENT — PAIN DESCRIPTION - LOCATION
LOCATION: BACK

## 2022-11-19 ASSESSMENT — PAIN SCALES - GENERAL
PAINLEVEL_OUTOF10: 7
PAINLEVEL_OUTOF10: 8
PAINLEVEL_OUTOF10: 10
PAINLEVEL_OUTOF10: 8
PAINLEVEL_OUTOF10: 10
PAINLEVEL_OUTOF10: 8
PAINLEVEL_OUTOF10: 10
PAINLEVEL_OUTOF10: 8
PAINLEVEL_OUTOF10: 9
PAINLEVEL_OUTOF10: 7
PAINLEVEL_OUTOF10: 8
PAINLEVEL_OUTOF10: 7
PAINLEVEL_OUTOF10: 10

## 2022-11-19 ASSESSMENT — PAIN DESCRIPTION - PAIN TYPE
TYPE: SURGICAL PAIN

## 2022-11-19 ASSESSMENT — PAIN DESCRIPTION - DESCRIPTORS
DESCRIPTORS: ACHING;DISCOMFORT
DESCRIPTORS: ACHING

## 2022-11-19 ASSESSMENT — PAIN DESCRIPTION - ONSET: ONSET: ON-GOING

## 2022-11-19 NOTE — PROGRESS NOTES
Scheduled medications administered, see MAR. Pt reporting 10/10 back pain. Scheduled robaxin administered. Ice packs provided for back. Pt requesting something else for pain, PRN oxycodone and PRN dilaudid not due at this time. Refusing lidocaine patch. Refusing PRN valium. Repositioned to left side for comfort. Spoke with MD in person regarding pain, no new orders at this time.

## 2022-11-19 NOTE — PLAN OF CARE
Problem: Discharge Planning  Goal: Discharge to home or other facility with appropriate resources  Outcome: Progressing  Discharge plan reviewed with pt. All questions answered at this time. Problem: Pain  Goal: Verbalizes/displays adequate comfort level or baseline comfort level  11/19/2022 0817 by Anton Higgins RN  Outcome: Progressing   C/o pain addressed with ice therapy, PRN pain medications, repositioning, and rest. Will continue to assess pain on 0-10 scale for appropriate pain management.

## 2022-11-19 NOTE — PROGRESS NOTES
Progress Note  Physical Medicine and Rehabilitation    Patient: Uriah Bah  [id number]  Date: 11/19/2022      Chief Complaint: Back pain    History of Present Illness/Hospital Course: Uriah Bah is a 61 y.o. male, past medical history of type 2 diabetes, hypertension, hyperlipidemia who has been having chronic thoracic and lumbar pain with lower extremity weakness. He had an MRI done which demonstrated epidural lipomatosis severe spinal stenosis and cord compression from the T5 T6-T9 levels has severe central stenosis at L3-L4 and L4-L5 with moderate to severe stenosis at L2-L3. This likely secondary to a combination of epidural lipomatosis and facet arthropathy. Patient has been seen neurosurgery and had failed outpatient conservative management. He underwent T5-T9 decompression with resection of epidural lipomatosis with L2-L5 decompression with resection of epidural lipomatosis on 11/15.    - Denied by insurance     Prior Level of Function:  Independent for mobility, ADLs, and IADLs    Current Level of Function:  Mod assist    Pertinent Social History:  Support: Lives at home with wife   Home set-up: 2 story with a basement. Would need to use 13 stairs. Past Medical History:   Diagnosis Date    Cerebral artery occlusion with cerebral infarction ([address]     TIA's x 4    Diabetes mellitus ([address]     Hyperlipidemia     Hypertension     TIA (transient ischemic attack)        Past Surgical History:   Procedure Laterality Date    BACK SURGERY      LUMBAR X2    CARDIAC SURGERY      OPEN HEART 2006, SEES DR RASHID NOW    CERVICAL FUSION      C 6-7    ELBOW SURGERY Bilateral     TENDON REPAIR    HAND SURGERY      x9    LUMBAR SPINE SURGERY N/A 11/15/2022    T5-T9 DECOMPRESSION WITH RESECTION OF EPIDURAL LIPOMATOSIS WITH L2-L5 DECOMPRESSION WITH RESECTION OF EPIDURAL LIPOMATOSIS performed by Mónica Dang MD at [address] [place] [address] Bilateral     ROTATOR CUFF       History reviewed.  No pertinent family history. Social History     Socioeconomic History    Marital status:      Spouse name: None    Number of children: None    Years of education: None    Highest education level: None   Tobacco Use    Smoking status: Never    Smokeless tobacco: Never   Vaping Use    Vaping Use: Never used   Substance and Sexual Activity    Drug use: Never           REVIEW OF SYSTEMS:   CONSTITUTIONAL: negative for fevers, chills, diaphoresis, appetite change, night sweats, unexpected weight change, fatigue  EYES: negative for blurred vision, eye discharge, visual disturbance and icterus  HEENT: negative for hearing loss, tinnitus, ear drainage, sinus pressure, nasal congestion, epistaxis and snoring  RESPIRATORY: Negative for hemoptysis, cough, sputum production  CARDIOVASCULAR: negative for chest pain, palpitations, exertional chest pressure/discomfort, syncope, edema  GASTROINTESTINAL: negative for nausea, vomiting, diarrhea, blood in stool, abdominal pain, constipation  GENITOURINARY: negative for frequency, dysuria, urinary incontinence, decreased urine volume, and hematuria  HEMATOLOGIC/LYMPHATIC: negative for easy bruising, bleeding and lymphadenopathy  ALLERGIC/IMMUNOLOGIC: negative for recurrent infections, angioedema, anaphylaxis and drug reactions  ENDOCRINE: negative for weight changes and diabetic symptoms including polyuria, polydipsia and polyphagia  MUSCULOSKELETAL: negative for pain, joint swelling, decreased range of motion  NEUROLOGICAL: negative for headaches, slurred speech, unilateral weakness  PSYCHIATRIC/BEHAVIORAL: negative for hallucinations, behavioral problems, confusion and agitation.      Physical Examination:  Vitals: Patient Vitals for the past 24 hrs:   BP Temp Temp src Pulse Resp SpO2   11/19/22 0630 124/67 98.6 °F (37 °C) Oral 67 18 97 %   11/19/22 0230 121/63 98.9 °F (37.2 °C) Oral 76 18 97 %   11/18/22 2218 110/65 97.8 °F (36.6 °C) Oral 71 18 96 %   11/18/22 1841 (!) 111/58 99.6 °F (37.6 °C) Oral 76 18 96 %   11/18/22 1737 -- -- -- -- 18 --   11/18/22 1602 -- -- -- -- 18 --   11/18/22 1532 107/60 98.6 °F (37 °C) Oral 76 18 96 %   11/18/22 1343 -- -- -- -- 18 --       Const: Alert. WDWN. No distress  Eyes: Conjunctiva noninjected, no icterus noted; pupils equal, round, and reactive to light. HENT: Atraumatic, normocephalic; Oral mucosa moist  Neck: Trachea midline, neck supple. No thyromegaly noted. CV: Regular rate and rhythm, no murmur rub or gallop noted  Resp: Lungs clear to auscultation bilaterally, no rales wheezes or ronchi, no retractions. Respirations unlabored. GI: Soft, nontender, nondistended. Normal bowel sounds. No palpable masses. Skin: Normal temperature and turgor. No rashes or breakdown noted. Ext: No significant edema appreciated. No varicosities. MSK: Midline back scar extending from thoracic to lumbar region with no signs of infection. Neuro: Alert, oriented, appropriate. No cranial nerve deficits appreciated. Sensation intact to light touch. Motor examination reveals normal strength in all four limbs diffusely. No abnormalities with finger/nose or heel/shin noted. Reflexes normal and symmetric. Psych: Stable mood, normal judgement, normal affect     Lab Results   Component Value Date    WBC 7.2 11/16/2022    HGB 11.6 (L) 11/19/2022    HCT 32.7 (L) 11/19/2022    MCV 93.9 11/16/2022     11/16/2022     Lab Results   Component Value Date    INR 1.07 11/15/2022    PROTIME 13.8 11/15/2022     Lab Results   Component Value Date    CREATININE 0.7 (L) 11/16/2022    BUN 9 11/16/2022     (L) 11/16/2022    K 3.9 11/16/2022    CL 93 (L) 11/16/2022    CO2 28 11/16/2022     No results found for: ALT, AST, GGT, ALKPHOS, BILITOT     Most recent EKG revealed:  11/15  Normal sinus rhythm 92 bpm       CT ABDOMEN PELVIS W IV CONTRAST Additional Contrast? Oral   Final Result      1. Mild wall thickening in the antrum and pylorus of the stomach.  Correlate for gastritis. 2.  Recent postsurgical changes post lumbar laminectomies. 3.  5 mm right lower lobe nodule. Following the Fleischner Society 2017 guidelines, if low risk for lung cancer no routine follow-up is necessary. If  high risk, then optional chest CT in 12 months.  qzxv      US RENAL COMPLETE   Final Result      Normal renal ultrasound                        XR THORACIC SPINE 1 VW   Final Result   1. Intraoperative fluoroscopy during spine surgery as described. Images demonstrate localization of posterior thoracic and lumbar vertebral elements. XR LUMBAR SPINE 1 VW   Final Result   1. Intraoperative fluoroscopy during spine surgery as described. Images demonstrate localization of posterior thoracic and lumbar vertebral elements. FLUORO FOR SURGICAL PROCEDURES   Final Result   1. Intraoperative fluoroscopy during spine surgery as described. Images demonstrate localization of posterior thoracic and lumbar vertebral elements. Assessment:  Severe central stenosis at T5-T9 with Epidural Lipomatosis/lipoma  Severe central stenosis at L2-3-4-5 with Epidural Lipomatosis/lipoma  Status post T5-T9--L2-5, laminectomy, medial facetectomy for decompression and foraminotomies,Resection of Epidural Lipoma/lipomatosis  11/15/22    -Neurosurgery following   -Continue PT/OT   -Pain management: Oxycodone and lidocaine patch   -Robaxin scheduled and Valium as needed for muscle spasms   -Postop incision care     Type 2 diabetes  -Continue with insulin pump  -Management per primary team     Essential hypertension   -Continue metoprolol, losartan/HCTZ     Hyperlipidemia  -continue rosuvastatin, ezetemibe     GERD  -continue PPI     History of TIAs  -continue statin. Sharlene Stewart     -ASA on hold    Impairments- Decreased functional mobility, Decreased ADLs    Recommendations:  Denied by insurance - peer to peer started     Lazarus Dias D.O. M.P.H  PM&R  11/19/2022  11:55 AM

## 2022-11-19 NOTE — PROGRESS NOTES
[place] neurosurgery note    S: Complains of back pain. O: AFVSS  5/5 in BLEs. Sensation intact  Incision c/d/I    A/P: s/p thoracic and lumbar decompression for epidural lipomatosis  Con't pain control  Ambulate, PT/OT  Awaiting dispo to NIVIA Cook MD

## 2022-11-19 NOTE — PROGRESS NOTES
Patient is alert and oriented. Vital signs are stable. Surgical incisions are clean, dry and intact. Patient' complains of pain often and is administered PRN pain medication per MAR. Patient ambulates x1 with a walker. Patient tolerates ambulation well. Patient voiding urine without complication. Bed is in the lowest position. Bed alarm is activated. Call light is within reach. Will continue to monitor and reassess.

## 2022-11-19 NOTE — PROGRESS NOTES
Pt with complaint of chest pain that he states radiates to back. States has been ongoing since yesterday. Secure message sent to MD to notify of above, awaiting response/orders.

## 2022-11-19 NOTE — PROGRESS NOTES
Hospital Medicine  Consult progress note      Chief Complaint: Neck pain, back pain    Date of Service: Pt seen/examined in consultation on 11/15/21    History Of Present Illness:      61 y.o. male who we are asked to see/evaluate by Devaughn Allison MD for medical management of diabetes mellitus type 2, hypertension     Patient has a medical history of insulin-dependent DM , hypertension, hyperlipidemia, TIAs. .. He has been having chronic thoracic and lumbar pain with lower extremity weakness and an MRI demonstrated epidural lipomatosis severe spinal stenosis with cord compression from the T5-6 to T9 levels and severe central stenosis at L3-4 and L4-5 with moderate to severe stenosis at L2-3 this is secondary to a combination of epidural lipomatosis and facet arthropathy. Patient failed outpatient conservative treatment and underwent an elective T5-T9--L2-5,laminectomy, medial facetectomy for decompression and foraminotomies,Resection of Epidural Lipoma/lipomatosis  11/15/22. Hospitalist service was consulted for postop medical management    Interval history:  Patient was seen and examined at bedside today  No new issues. Progressing through pain.          Past Medical History:        Diagnosis Date    Cerebral artery occlusion with cerebral infarction ([address]     TIA's x 4    Diabetes mellitus ([address]     Hyperlipidemia     Hypertension     TIA (transient ischemic attack)        Past Surgical History:        Procedure Laterality Date    BACK SURGERY      LUMBAR X2    CARDIAC SURGERY      OPEN HEART 2006, SEES DR RASHID NOW    CERVICAL FUSION      C 6-7    ELBOW SURGERY Bilateral     TENDON REPAIR    HAND SURGERY      x9    LUMBAR SPINE SURGERY N/A 11/15/2022    T5-T9 DECOMPRESSION WITH RESECTION OF EPIDURAL LIPOMATOSIS WITH L2-L5 DECOMPRESSION WITH RESECTION OF EPIDURAL LIPOMATOSIS performed by Devaughn Allison MD at [address] [place] [address] Bilateral     ROTATOR CUFF       Medications Prior to Admission: Prior to Admission medications    Medication Sig Start Date End Date Taking? Authorizing Provider   insulin lispro (HUMALOG) 100 UNIT/ML injection vial Inject into the skin 3 times daily (before meals) Insulin pump   Yes Historical Provider, MD   rosuvastatin (CRESTOR) 10 MG tablet Take 10 mg by mouth daily   Yes Historical Provider, MD   ezetimibe (ZETIA) 10 MG tablet Take 10 mg by mouth daily   Yes Historical Provider, MD   omeprazole (PRILOSEC) 20 MG delayed release capsule Take 20 mg by mouth daily   Yes Historical Provider, MD   aspirin 81 MG chewable tablet Take 81 mg by mouth daily Taking, but had to stop for Sx,  says he can only take the coated ones so he doesn't get ulcer   Yes Historical Provider, MD   Multiple Vitamins-Minerals (THERAPEUTIC MULTIVITAMIN-MINERALS) tablet Take 1 tablet by mouth daily   Yes Historical Provider, MD   losartan-hydroCHLOROthiazide (HYZAAR) 100-25 MG per tablet Take 1 tablet by mouth daily   Yes Historical Provider, MD   Garlic 9083 MG CAPS Take by mouth   Yes Historical Provider, MD   Coenzyme Q10 (CO Q-10) 100 MG CAPS Take by mouth   Yes Historical Provider, MD   bisoprolol (ZEBETA) 10 MG tablet Take 10 mg by mouth at bedtime   Yes Historical Provider, MD   vitamin E 100 units capsule Take 1 capsule by mouth daily 4/25/19  Yes Historical Provider, MD   ascorbic acid (VITAMIN C) 500 MG tablet Take 1 tablet by mouth daily 12/7/10  Yes Historical Provider, MD   citalopram (CELEXA) 40 MG tablet Take 40 mg by mouth daily   Yes Historical Provider, MD   vitamin D3 (CHOLECALCIFEROL) 125 MCG (5000 UT) TABS tablet Take 1 tablet by mouth daily    Historical Provider, MD       Allergies:  Acetaminophen, Atorvastatin, Ciprofloxacin, Cortisone, Gabapentin, Hydrocodone, Liraglutide, Simvastatin, Tetracycline, and Penicillins    Social History:      The patient currently lives     TOBACCO:   reports that he has never smoked.  He has never used smokeless tobacco.  ETOH:   has no history on file for alcohol use. Family History:      Reviewed in detail and negative for DM, CAD, Cancer, CVA. Positive as follows:    History reviewed. No pertinent family history. REVIEW OF SYSTEMS COMPLETED:   Pertinent positives as noted in the HPI. All other systems reviewed and negative. PHYSICAL EXAM PERFORMED:  /63   Pulse 74   Temp 97.9 °F (36.6 °C) (Oral)   Resp 16   Ht 5' 8.5\" (1.74 m)   Wt 217 lb (98.4 kg)   SpO2 95%   BMI 32.51 kg/m²   General appearance: No apparent distress, appears stated age and cooperative. HEENT: Normal cephalic, atraumatic without obvious deformity. Pupils equal, round, and reactive to light. Extra ocular muscles intact. Conjunctivae/corneas clear. Neck: Supple, with full range of motion. No jugular venous distention. Trachea midline. Respiratory:  Normal respiratory effort. Clear to auscultation, bilaterally without Rales/Wheezes/Rhonchi. Cardiovascular: Regular rate and rhythm with normal S1/S2 without murmurs, rubs or gallops. Abdomen: Soft, tender epigastrium to deep palpation. , non-distended . Musculoskeletal:  No clubbing, cyanosis or edema bilaterally. Skin: Skin color, texture, turgor normal.  No rashes or lesions. Neurologic:  Neurovascularly intact without any focal sensory/motor deficits. Cranial nerves: II-XII intact, grossly non-focal.  Psychiatric: Alert and oriented, thought content appropriate, normal insight  Capillary Refill: Brisk,3 seconds, normal     Labs:     Recent Labs     11/19/22  0627   HGB 11.6*   HCT 32.7*       Recent Labs     11/16/22  2118   *   K 3.9   CL 93*   CO2 28   BUN 9   CREATININE 0.7*   CALCIUM 8.5       No results for input(s): AST, ALT, BILIDIR, BILITOT, ALKPHOS in the last 72 hours. No results for input(s): INR in the last 72 hours.     Recent Labs     11/17/22  0809   TROPONINI <0.01         Urinalysis:  No results found for: Kt Fabry, BACTERIA, RBCUA, BLOODU, [address], GLUCOSEU    Radiology: I have reviewed the radiology reports with the following interpretation:     CT ABDOMEN PELVIS W IV CONTRAST Additional Contrast? Oral   Final Result      1. Mild wall thickening in the antrum and pylorus of the stomach. Correlate for gastritis. 2.  Recent postsurgical changes post lumbar laminectomies. 3.  5 mm right lower lobe nodule. Following the Fleischner Society 2017 guidelines, if low risk for lung cancer no routine follow-up is necessary. If  high risk, then optional chest CT in 12 months.  qzxv      US RENAL COMPLETE   Final Result      Normal renal ultrasound                        XR THORACIC SPINE 1 VW   Final Result   1. Intraoperative fluoroscopy during spine surgery as described. Images demonstrate localization of posterior thoracic and lumbar vertebral elements. XR LUMBAR SPINE 1 VW   Final Result   1. Intraoperative fluoroscopy during spine surgery as described. Images demonstrate localization of posterior thoracic and lumbar vertebral elements. FLUORO FOR SURGICAL PROCEDURES   Final Result   1. Intraoperative fluoroscopy during spine surgery as described. Images demonstrate localization of posterior thoracic and lumbar vertebral elements. ASSESSMENT:    -Severe central stenosis at T5-T9 with Epidural Lipomatosis/lipoma  -Severe central stenosis at L2-3-4-5 with Epidural Lipomatosis/lipoma    S/p  T5-T9--L2-5,   laminectomy, medial facetectomy for decompression and foraminotomies,Resection of Epidural Lipoma/lipomatosis  11/15/22     Continue postop care per neurosurgery-pain management, PT and OT  11/19: denied aru dispo. Recommend primary team look to alternative (SNF, home with [facility])    Acute onset lower chest and epigastric abdominal pain at, radiates to the back:  -Unclear etiology  -No associated symptoms  -Troponin remained undetectable, EKG with no ischemic changes.  -amylase and lipase wnl. CT a/p showed potential gastritis. Ordered carafate prn.      Insulin-dependent diabetes mellitus type 2-on insulin pump-continue with insulin pump    Essential hypertension-elevated-  -Decreased IV fluid from 125 mL to 50 mL/h on November 16, 2022. Consider discontinuation of IV fluid. Continue metoprolol, losartan/HCTZ    Hyperlipidemia--continue rosuvastatin, ezetemibe    GERD-continue PPI    History of TIAs--continue statin. .  ASA on hold      DVT Prophylaxis: scds  Diet: ADULT DIET; Regular  Code Status: Full Code    High risk due to IV opiates.     Thank you for the consultation, will follow up as needed    Electronically signed by Stacey Ashley MD on 11/19/2022 at 3:32 PM

## 2022-11-20 LAB
EKG ATRIAL RATE: 92 BPM
EKG DIAGNOSIS: NORMAL
EKG P AXIS: 41 DEGREES
EKG P-R INTERVAL: 188 MS
EKG Q-T INTERVAL: 352 MS
EKG QRS DURATION: 88 MS
EKG QTC CALCULATION (BAZETT): 435 MS
EKG R AXIS: -7 DEGREES
EKG T AXIS: 9 DEGREES
EKG VENTRICULAR RATE: 92 BPM
GLUCOSE BLD-MCNC: 117 MG/DL (ref 70–99)
GLUCOSE BLD-MCNC: 129 MG/DL (ref 70–99)
GLUCOSE BLD-MCNC: 130 MG/DL (ref 70–99)
GLUCOSE BLD-MCNC: 93 MG/DL (ref 70–99)
PERFORMED ON: ABNORMAL
PERFORMED ON: NORMAL

## 2022-11-20 PROCEDURE — 6370000000 HC RX 637 (ALT 250 FOR IP): Performed by: NURSE PRACTITIONER

## 2022-11-20 PROCEDURE — 6360000002 HC RX W HCPCS: Performed by: PHYSICIAN ASSISTANT

## 2022-11-20 PROCEDURE — 93010 ELECTROCARDIOGRAM REPORT: CPT | Performed by: INTERNAL MEDICINE

## 2022-11-20 PROCEDURE — 2580000003 HC RX 258: Performed by: PHYSICIAN ASSISTANT

## 2022-11-20 PROCEDURE — 97116 GAIT TRAINING THERAPY: CPT

## 2022-11-20 PROCEDURE — 6370000000 HC RX 637 (ALT 250 FOR IP): Performed by: INTERNAL MEDICINE

## 2022-11-20 PROCEDURE — 97530 THERAPEUTIC ACTIVITIES: CPT

## 2022-11-20 PROCEDURE — 6370000000 HC RX 637 (ALT 250 FOR IP): Performed by: PHYSICIAN ASSISTANT

## 2022-11-20 PROCEDURE — 1200000000 HC SEMI PRIVATE

## 2022-11-20 PROCEDURE — 97535 SELF CARE MNGMENT TRAINING: CPT

## 2022-11-20 RX ORDER — DIPHENHYDRAMINE HCL 25 MG
25 TABLET ORAL EVERY 8 HOURS PRN
Status: DISCONTINUED | OUTPATIENT
Start: 2022-11-20 | End: 2022-11-21 | Stop reason: HOSPADM

## 2022-11-20 RX ORDER — DIPHENHYDRAMINE HCL 25 MG
25 TABLET ORAL EVERY 6 HOURS PRN
Status: DISCONTINUED | OUTPATIENT
Start: 2022-11-20 | End: 2022-11-20

## 2022-11-20 RX ADMIN — DIAZEPAM 5 MG: 5 TABLET ORAL at 03:44

## 2022-11-20 RX ADMIN — HYDROMORPHONE HYDROCHLORIDE 0.5 MG: 1 INJECTION, SOLUTION INTRAMUSCULAR; INTRAVENOUS; SUBCUTANEOUS at 12:00

## 2022-11-20 RX ADMIN — HYDROMORPHONE HYDROCHLORIDE 0.5 MG: 1 INJECTION, SOLUTION INTRAMUSCULAR; INTRAVENOUS; SUBCUTANEOUS at 08:32

## 2022-11-20 RX ADMIN — METOPROLOL SUCCINATE 100 MG: 100 TABLET, EXTENDED RELEASE ORAL at 08:11

## 2022-11-20 RX ADMIN — METHOCARBAMOL TABLETS 1000 MG: 500 TABLET, COATED ORAL at 08:12

## 2022-11-20 RX ADMIN — DIPHENHYDRAMINE HYDROCHLORIDE 25 MG: 25 TABLET ORAL at 12:10

## 2022-11-20 RX ADMIN — SODIUM CHLORIDE, PRESERVATIVE FREE 10 ML: 5 INJECTION INTRAVENOUS at 08:17

## 2022-11-20 RX ADMIN — POLYETHYLENE GLYCOL 3350 17 G: 17 POWDER, FOR SOLUTION ORAL at 06:32

## 2022-11-20 RX ADMIN — LOSARTAN POTASSIUM AND HYDROCHLOROTHIAZIDE 1 TABLET: 100; 25 TABLET, FILM COATED ORAL at 08:11

## 2022-11-20 RX ADMIN — MUPIROCIN: 20 OINTMENT TOPICAL at 13:23

## 2022-11-20 RX ADMIN — OXYCODONE 10 MG: 5 TABLET ORAL at 14:30

## 2022-11-20 RX ADMIN — OXYCODONE 10 MG: 5 TABLET ORAL at 06:29

## 2022-11-20 RX ADMIN — NALOXEGOL OXALATE 25 MG: 25 TABLET, FILM COATED ORAL at 06:29

## 2022-11-20 RX ADMIN — OXYCODONE 10 MG: 5 TABLET ORAL at 23:58

## 2022-11-20 RX ADMIN — CITALOPRAM 40 MG: 40 TABLET, FILM COATED ORAL at 08:11

## 2022-11-20 RX ADMIN — METHOCARBAMOL TABLETS 1000 MG: 500 TABLET, COATED ORAL at 03:43

## 2022-11-20 RX ADMIN — OXYCODONE 10 MG: 5 TABLET ORAL at 18:23

## 2022-11-20 RX ADMIN — DIAZEPAM 5 MG: 5 TABLET ORAL at 23:58

## 2022-11-20 RX ADMIN — SENNOSIDES AND DOCUSATE SODIUM 1 TABLET: 50; 8.6 TABLET ORAL at 08:13

## 2022-11-20 RX ADMIN — HYDROMORPHONE HYDROCHLORIDE 0.5 MG: 1 INJECTION, SOLUTION INTRAMUSCULAR; INTRAVENOUS; SUBCUTANEOUS at 15:38

## 2022-11-20 RX ADMIN — HYDROMORPHONE HYDROCHLORIDE 0.5 MG: 1 INJECTION, SOLUTION INTRAMUSCULAR; INTRAVENOUS; SUBCUTANEOUS at 00:02

## 2022-11-20 RX ADMIN — ENOXAPARIN SODIUM 40 MG: 100 INJECTION SUBCUTANEOUS at 08:13

## 2022-11-20 RX ADMIN — SUCRALFATE 1 G: 1 TABLET ORAL at 08:12

## 2022-11-20 RX ADMIN — SUCRALFATE 1 G: 1 TABLET ORAL at 20:03

## 2022-11-20 RX ADMIN — OXYCODONE 10 MG: 5 TABLET ORAL at 01:43

## 2022-11-20 RX ADMIN — EZETIMIBE 10 MG: 10 TABLET ORAL at 08:11

## 2022-11-20 RX ADMIN — METHOCARBAMOL TABLETS 1000 MG: 500 TABLET, COATED ORAL at 14:30

## 2022-11-20 RX ADMIN — HYDROMORPHONE HYDROCHLORIDE 0.5 MG: 1 INJECTION, SOLUTION INTRAMUSCULAR; INTRAVENOUS; SUBCUTANEOUS at 19:51

## 2022-11-20 RX ADMIN — SENNOSIDES AND DOCUSATE SODIUM 1 TABLET: 50; 8.6 TABLET ORAL at 20:03

## 2022-11-20 RX ADMIN — PANTOPRAZOLE SODIUM 40 MG: 40 TABLET, DELAYED RELEASE ORAL at 06:29

## 2022-11-20 RX ADMIN — MUPIROCIN: 20 OINTMENT TOPICAL at 16:37

## 2022-11-20 RX ADMIN — ROSUVASTATIN CALCIUM 10 MG: 10 TABLET, COATED ORAL at 08:12

## 2022-11-20 RX ADMIN — MUPIROCIN: 20 OINTMENT TOPICAL at 20:06

## 2022-11-20 RX ADMIN — HYDROMORPHONE HYDROCHLORIDE 0.5 MG: 1 INJECTION, SOLUTION INTRAMUSCULAR; INTRAVENOUS; SUBCUTANEOUS at 03:44

## 2022-11-20 RX ADMIN — METHOCARBAMOL TABLETS 1000 MG: 500 TABLET, COATED ORAL at 20:03

## 2022-11-20 RX ADMIN — DIAZEPAM 5 MG: 5 TABLET ORAL at 12:10

## 2022-11-20 RX ADMIN — PANTOPRAZOLE SODIUM 40 MG: 40 TABLET, DELAYED RELEASE ORAL at 15:38

## 2022-11-20 RX ADMIN — TAMSULOSIN HYDROCHLORIDE 0.4 MG: 0.4 CAPSULE ORAL at 08:13

## 2022-11-20 RX ADMIN — SODIUM CHLORIDE, PRESERVATIVE FREE 10 ML: 5 INJECTION INTRAVENOUS at 20:06

## 2022-11-20 RX ADMIN — OXYCODONE 10 MG: 5 TABLET ORAL at 10:31

## 2022-11-20 ASSESSMENT — PAIN SCALES - GENERAL
PAINLEVEL_OUTOF10: 9
PAINLEVEL_OUTOF10: 8
PAINLEVEL_OUTOF10: 8
PAINLEVEL_OUTOF10: 7
PAINLEVEL_OUTOF10: 8
PAINLEVEL_OUTOF10: 7
PAINLEVEL_OUTOF10: 7
PAINLEVEL_OUTOF10: 8
PAINLEVEL_OUTOF10: 8
PAINLEVEL_OUTOF10: 7
PAINLEVEL_OUTOF10: 8
PAINLEVEL_OUTOF10: 8
PAINLEVEL_OUTOF10: 7

## 2022-11-20 ASSESSMENT — PAIN DESCRIPTION - DESCRIPTORS
DESCRIPTORS: ACHING
DESCRIPTORS: SHOOTING
DESCRIPTORS: ACHING

## 2022-11-20 ASSESSMENT — PAIN DESCRIPTION - LOCATION
LOCATION: BACK

## 2022-11-20 ASSESSMENT — PAIN DESCRIPTION - ORIENTATION
ORIENTATION: MID
ORIENTATION: RIGHT;LEFT
ORIENTATION: MID

## 2022-11-20 ASSESSMENT — PAIN DESCRIPTION - FREQUENCY: FREQUENCY: CONTINUOUS

## 2022-11-20 ASSESSMENT — PAIN - FUNCTIONAL ASSESSMENT: PAIN_FUNCTIONAL_ASSESSMENT: PREVENTS OR INTERFERES SOME ACTIVE ACTIVITIES AND ADLS

## 2022-11-20 ASSESSMENT — PAIN DESCRIPTION - PAIN TYPE: TYPE: SURGICAL PAIN

## 2022-11-20 ASSESSMENT — PAIN DESCRIPTION - ONSET: ONSET: ON-GOING

## 2022-11-20 NOTE — PROGRESS NOTES
and Lumbar spine surgery (N/A, 11/15/2022). Assessment   Body Structures, Functions, Activity Limitations Requiring Skilled Therapeutic Intervention: Decreased functional mobility ; Decreased strength;Decreased balance; Increased pain  Assessment: Patient demonstrates impaired functional mobility, presenting below his typically (I) baseline. Patient now requiring (S) to SBA with RW for mobility, able to negotiate stairs with 2 rails and SBA. Patient planning to return home upon d/c with (A) from family PRN. Patient will continue to benefit from additional skilled PT intervention to facilitate safe mobility and to optimize (I) to promote return to prior level of function. Treatment Diagnosis: impaired functional mobility  Therapy Prognosis: Good  Barriers to Learning: none  Requires PT Follow-Up: Yes  Activity Tolerance  Activity Tolerance: Patient tolerated treatment well     Plan   Physcial Therapy Plan  General Plan: 5-7 times per week  Current Treatment Recommendations: Functional mobility training, Transfer training, Balance training, Gait training, Stair training, Patient/Caregiver education & training, Therapeutic activities, Equipment evaluation, education, & procurement, Strengthening, Home exercise program, Safety education & training, Endurance training  Safety Devices  Type of Devices: Bed alarm in place, Call light within reach, Gait belt, Left in bed, Nurse notified     Restrictions  Restrictions/Precautions  Restrictions/Precautions: Fall Risk (high fall risk)  Position Activity Restriction  Spinal Precautions: No Bending, No Twisting, No Lifting  Other position/activity restrictions: activity as tolerated, ambulate patient, adult diet - regular, insulin pump     Subjective   General  Chart Reviewed:  Yes  Additional Pertinent Hx: Admit 11/15 for T5-T9 DECOMPRESSION WITH RESECTION OF EPIDURAL LIPOMATOSIS WITH L2-L5 DECOMPRESSION WITH RESECTION OF EPIDURAL LIPOMATOSIS; PMHx:  Family / Caregiver Present: No  Follows Commands: Within Functional Limits  General Comment  Comments: Patient up in hallway with OT - agreeable to PT session. Subjective  Subjective: Patient c/o midback pain, \"between my shoulder blades\" - exacerbated with standing and walking - RN notified. Patient does not rate. Cognition   Orientation  Overall Orientation Status: Within Functional Limits  Orientation Level: Oriented X4  Cognition  Following Commands:  Follows one step commands with increased time  Attention Span: Attends with cues to redirect  Memory:  (unable to recall spinal precautions)  Safety Judgement: Decreased awareness of need for safety  Insights: Decreased awareness of deficits  Initiation: Requires cues for some  Sequencing: Requires cues for some  Cognition Comment: patient reports planning to return to playing keyboard in a 2 weeks - education provided on carrying/lifting equipment     Objective     Bed mobility  Sit to Supine: Modified independent (with log roll technique and use of rail, increased time to perform)  Scooting: Modified independent (seated at edge of bed)  Transfers  Sit to Stand: Supervision (to RW - cues for hand placement and sequencing)  Stand to Sit: Supervision (from RW - cues for hand placement and sequencing)  Stand Pivot Transfers: Supervision (with RW)  Ambulation  Surface: Level tile  Device: Rolling Walker  Assistance: Stand by assistance  Quality of Gait: slow cate, steady gait - no loss of balance, (B) shoulder elevation noted with heavy use of (B) UEs with fatigue, decreased (B) step length and foot clearance  Distance: 400ft x 2  Stairs/Curb  Stairs?: Yes  Stairs  Stairs Height:  (2 6\" and 3 4\" x 4 sets without seated rest)  Rails: Bilateral  Assistance: Stand by assistance  Comment: reciprocal pattern to ascend and descend     Balance  Posture: Fair (forward flexed, rounded shoulders)  Sitting - Static: Good  Sitting - Dynamic: Good;-  Standing - Static: Fair;+  Standing - Dynamic: Fair  Comments: (S) to SBA with RW for standing balance during mobility       AM-PAC Score  AM-PAC Inpatient Mobility Raw Score : 18 (11/20/22 0925)  AM-PAC Inpatient T-Scale Score : 43.63 (11/20/22 0925)  Mobility Inpatient CMS 0-100% Score: 46.58 (11/20/22 0925)  Mobility Inpatient CMS G-Code Modifier : CK (11/20/22 0925)       Goals  Short Term Goals  Time Frame for Short Term Goals: discharge - all goals ongoing 11/20  Short Term Goal 1: Pt will transfer supine <--> sit with CGA via log roll , met 11/17 - new STG - supine to sit with Supervision  Short Term Goal 2: Pt will transfer sit <--> stand with supervision - goal met 11/20; updated: Pt. will demonstrate sit <-> stand modified (I) with LRAD  Short Term Goal 3: Pt will ambulate 150 ft with LRAD and supervision  Short Term Goal 4: Pt will negotiate 4 steps with CGA - goal met 11/20; updated: Pt. will negotiate >/= 4 stairs with no rails and (S) with LRAD to enter/exit home  Patient Goals   Patient Goals : \"to go home, walk without the walker, get back to playing [employer]"       Education  Patient Education  Education Given To: Patient  Education Provided: Role of Therapy;Plan of Care;Precautions  Education Provided Comments: use of call light, PT recommendations  Education Method: Demonstration;Verbal  Barriers to Learning: None  Education Outcome: Verbalized understanding;Continued education needed      Therapy Time   Individual Concurrent Group Co-treatment   Time In 0902         Time Out 0925         Minutes 23                 Timed Code Treatment Minutes: 23 minutes    Total Treatment Minutes: 23 Minutes    If patient discharges prior to next treatment, this note will serve as discharge summary.     Abi Contreras PT, DPT #[id number]

## 2022-11-20 NOTE — PROGRESS NOTES
The pt was asking for his Mupirocin ointment for his nose, and was also complaining of some general itching on his back. I sent a perfect serve message to the medical MD (Dr. Sheila shipley) letting him know about the pt's requests. I will continue to follow throughout the shift.   Weston Coyne RN

## 2022-11-20 NOTE — PROGRESS NOTES
[place] neurosurgery note    S: No new issues    O: AFVSS  5/5 in BLEs. Sensation intact  Incision c/d/I    A/P: s/p thoracic and lumbar decompression for epidural lipomatosis  Con't pain control  Ambulate, PT/OT  Awaiting dispo to NIVIA Sunshine MD

## 2022-11-20 NOTE — PROGRESS NOTES
Hospital Medicine  Consult progress note      Chief Complaint: Neck pain, back pain    Date of Service: Pt seen/examined in consultation on 11/15/21    History Of Present Illness:      61 y.o. male who we are asked to see/evaluate by Eden Hearn MD for medical management of diabetes mellitus type 2, hypertension     Patient has a medical history of insulin-dependent DM , hypertension, hyperlipidemia, TIAs. .. He has been having chronic thoracic and lumbar pain with lower extremity weakness and an MRI demonstrated epidural lipomatosis severe spinal stenosis with cord compression from the T5-6 to T9 levels and severe central stenosis at L3-4 and L4-5 with moderate to severe stenosis at L2-3 this is secondary to a combination of epidural lipomatosis and facet arthropathy. Patient failed outpatient conservative treatment and underwent an elective T5-T9--L2-5,laminectomy, medial facetectomy for decompression and foraminotomies,Resection of Epidural Lipoma/lipomatosis  11/15/22. Hospitalist service was consulted for postop medical management    Interval history:  Patient was seen and examined at bedside today  C/o sores in nostrils today. Otherwise no new c./o.         Past Medical History:        Diagnosis Date    Cerebral artery occlusion with cerebral infarction ([address]     TIA's x 4    Diabetes mellitus ([address]     Hyperlipidemia     Hypertension     TIA (transient ischemic attack)        Past Surgical History:        Procedure Laterality Date    BACK SURGERY      LUMBAR X2    CARDIAC SURGERY      OPEN HEART 2006, SEES DR RASHID NOW    CERVICAL FUSION      C 6-7    ELBOW SURGERY Bilateral     TENDON REPAIR    HAND SURGERY      x9    LUMBAR SPINE SURGERY N/A 11/15/2022    T5-T9 DECOMPRESSION WITH RESECTION OF EPIDURAL LIPOMATOSIS WITH L2-L5 DECOMPRESSION WITH RESECTION OF EPIDURAL LIPOMATOSIS performed by Eden Hearn MD at [address] [place] [address] Bilateral     ROTATOR CUFF       Medications Prior to Admission:    Prior to Admission medications    Medication Sig Start Date End Date Taking? Authorizing Provider   insulin lispro (HUMALOG) 100 UNIT/ML injection vial Inject into the skin 3 times daily (before meals) Insulin pump   Yes Historical Provider, MD   rosuvastatin (CRESTOR) 10 MG tablet Take 10 mg by mouth daily   Yes Historical Provider, MD   ezetimibe (ZETIA) 10 MG tablet Take 10 mg by mouth daily   Yes Historical Provider, MD   omeprazole (PRILOSEC) 20 MG delayed release capsule Take 20 mg by mouth daily   Yes Historical Provider, MD   aspirin 81 MG chewable tablet Take 81 mg by mouth daily Taking, but had to stop for Sx,  says he can only take the coated ones so he doesn't get ulcer   Yes Historical Provider, MD   Multiple Vitamins-Minerals (THERAPEUTIC MULTIVITAMIN-MINERALS) tablet Take 1 tablet by mouth daily   Yes Historical Provider, MD   losartan-hydroCHLOROthiazide (HYZAAR) 100-25 MG per tablet Take 1 tablet by mouth daily   Yes Historical Provider, MD   Garlic 2375 MG CAPS Take by mouth   Yes Historical Provider, MD   Coenzyme Q10 (CO Q-10) 100 MG CAPS Take by mouth   Yes Historical Provider, MD   bisoprolol (ZEBETA) 10 MG tablet Take 10 mg by mouth at bedtime   Yes Historical Provider, MD   vitamin E 100 units capsule Take 1 capsule by mouth daily 4/25/19  Yes Historical Provider, MD   ascorbic acid (VITAMIN C) 500 MG tablet Take 1 tablet by mouth daily 12/7/10  Yes Historical Provider, MD   citalopram (CELEXA) 40 MG tablet Take 40 mg by mouth daily   Yes Historical Provider, MD   vitamin D3 (CHOLECALCIFEROL) 125 MCG (5000 UT) TABS tablet Take 1 tablet by mouth daily    Historical Provider, MD       Allergies:  Acetaminophen, Atorvastatin, Ciprofloxacin, Cortisone, Gabapentin, Hydrocodone, Liraglutide, Simvastatin, Tetracycline, and Penicillins    Social History:      The patient currently lives     TOBACCO:   reports that he has never smoked.  He has never used smokeless tobacco.  ETOH:   has no history on file for alcohol use. Family History:      Reviewed in detail and negative for DM, CAD, Cancer, CVA. Positive as follows:    History reviewed. No pertinent family history. REVIEW OF SYSTEMS COMPLETED:   Pertinent positives as noted in the HPI. All other systems reviewed and negative. PHYSICAL EXAM PERFORMED:  /63   Pulse 65   Temp 98 °F (36.7 °C) (Oral)   Resp 16   Ht 5' 8.5\" (1.74 m)   Wt 217 lb (98.4 kg)   SpO2 95%   BMI 32.51 kg/m²   General appearance: No apparent distress, appears stated age and cooperative. HEENT: Normal cephalic, atraumatic without obvious deformity. Pupils equal, round, and reactive to light. Extra ocular muscles intact. Conjunctivae/corneas clear. Neck: Supple, with full range of motion. No jugular venous distention. Trachea midline. Respiratory:  Normal respiratory effort. Clear to auscultation, bilaterally without Rales/Wheezes/Rhonchi. Cardiovascular: Regular rate and rhythm with normal S1/S2 without murmurs, rubs or gallops. Abdomen: Soft, tender epigastrium to deep palpation. , non-distended . Musculoskeletal:  No clubbing, cyanosis or edema bilaterally. Skin: Skin color, texture, turgor normal.  No rashes or lesions. Neurologic:  Neurovascularly intact without any focal sensory/motor deficits. Cranial nerves: II-XII intact, grossly non-focal.  Psychiatric: Alert and oriented, thought content appropriate, normal insight  Capillary Refill: Brisk,3 seconds, normal     Labs:     Recent Labs     11/19/22  0627   HGB 11.6*   HCT 32.7*       No results for input(s): NA, K, CL, CO2, BUN, CREATININE, CALCIUM, PHOS in the last 72 hours. Invalid input(s): MAGNES    No results for input(s): AST, ALT, BILIDIR, BILITOT, ALKPHOS in the last 72 hours. No results for input(s): INR in the last 72 hours. No results for input(s): Lexx Ebbs in the last 72 hours.       Urinalysis:  No results found for: Leigh Rape, [address], BACTERIA, RBCUA, BLOODU, Moni Ibrahim    Radiology: I have reviewed the radiology reports with the following interpretation:     CT ABDOMEN PELVIS W IV CONTRAST Additional Contrast? Oral   Final Result      1. Mild wall thickening in the antrum and pylorus of the stomach. Correlate for gastritis. 2.  Recent postsurgical changes post lumbar laminectomies. 3.  5 mm right lower lobe nodule. Following the Fleischner Society 2017 guidelines, if low risk for lung cancer no routine follow-up is necessary. If  high risk, then optional chest CT in 12 months.  qzxv      US RENAL COMPLETE   Final Result      Normal renal ultrasound                        XR THORACIC SPINE 1 VW   Final Result   1. Intraoperative fluoroscopy during spine surgery as described. Images demonstrate localization of posterior thoracic and lumbar vertebral elements. XR LUMBAR SPINE 1 VW   Final Result   1. Intraoperative fluoroscopy during spine surgery as described. Images demonstrate localization of posterior thoracic and lumbar vertebral elements. FLUORO FOR SURGICAL PROCEDURES   Final Result   1. Intraoperative fluoroscopy during spine surgery as described. Images demonstrate localization of posterior thoracic and lumbar vertebral elements. ASSESSMENT:    -Severe central stenosis at T5-T9 with Epidural Lipomatosis/lipoma  -Severe central stenosis at L2-3-4-5 with Epidural Lipomatosis/lipoma    S/p  T5-T9--L2-5,   laminectomy, medial facetectomy for decompression and foraminotomies,Resection of Epidural Lipoma/lipomatosis  11/15/22     Continue postop care per neurosurgery-pain management, PT and OT  11/19: denied aru dispo. Recommend primary team look to alternative (SNF, home with [facility])    Acute onset lower chest and epigastric abdominal pain at, radiates to the back:  -Unclear etiology  -No associated symptoms  -Troponin remained undetectable, EKG with no ischemic changes.  -amylase and lipase wnl. CT a/p showed potential gastritis.  Ordered

## 2022-11-20 NOTE — PROGRESS NOTES
Occupational Therapy  Facility/Department: [facility]  Occupational Therapy Treatment    Name: Maria Luz Beaulieu  : 1955  MRN: [id number]  Date of Service: 2022    Discharge Recommendations:   Maria Luz Beaulieu scored a 20/24 on the AM-PAC ADL Inpatient form. Current research shows that an AM-PAC score of 18 or greater is typically associated with a discharge to the patient's home setting. Based on the patient's AM-PAC score, and their current ADL deficits, it is recommended that the patient have 2-3 sessions per week of Occupational Therapy at d/c to increase the patient's independence. At this time, this patient demonstrates the endurance and safety to discharge home with home health OT and a follow up treatment frequency of 2-3x/wk. Please see assessment section for further patient specific details. If patient discharges prior to next session this note will serve as a discharge summary. Please see below for the latest assessment towards goals. OT Equipment Recommendations  Equipment Needed: No       Patient Diagnosis(es): Diagnoses of Thoracic myelopathy, Thoracic stenosis, and Spinal stenosis of lumbar region, unspecified whether neurogenic claudication present were pertinent to this visit. Past Medical History:  has a past medical history of Cerebral artery occlusion with cerebral infarction ([address] Diabetes mellitus ([address] Hyperlipidemia, Hypertension, and TIA (transient ischemic attack). Past Surgical History:  has a past surgical history that includes Cardiac surgery; Hand surgery; cervical fusion; shoulder surgery (Bilateral); Elbow surgery (Bilateral); back surgery; and Lumbar spine surgery (N/A, 11/15/2022). Treatment Diagnosis: impaired ADLs and functional mobility      Assessment   Performance deficits / Impairments: Decreased functional mobility ; Decreased endurance;Decreased ADL status; Decreased balance;Decreased strength;Decreased safe awareness;Decreased high-level IADLs    Assessment: Pt making progress. He was able to complete bathing and LB dressing with setup/supervision while seated and SBA when in stance. Pt performed functional transfers and mobility in hallway using RW with SBA. Pt continues to be primarily limited by pain. Recommend HHOT and initial 24 hr assist/sup. OT will continue to follow. Treatment Diagnosis: impaired ADLs and functional mobility  Activity Tolerance  Activity Tolerance: Patient Tolerated treatment well        Plan   Occupational Therapy Plan  Times Per Week: 5-7  Times Per Day: Once a day  Current Treatment Recommendations: ROM, Balance training, Pain management, Self-Care / ADL, Return to work related activity, Functional mobility training, Safety education & training, Home management training, Endurance training, Neuromuscular re-education     Restrictions  Restrictions/Precautions  Restrictions/Precautions: Fall Risk (high fall risk)  Position Activity Restriction  Spinal Precautions: No Bending, No Twisting, No Lifting  Other position/activity restrictions: activity as tolerated, ambulate patient, adult diet - regular, insulin pump    Subjective   General  Additional Pertinent Hx: 61 y.o. male with c/o chronic thoracic pain, lumbar pain  bilateral LE pain and weakness. Pt is POD #1 from T5-T9 DECOMPRESSION WITH RESECTION OF EPIDURAL LIPOMATOSIS WITH L2-L5 DECOMPRESSION WITH RESECTION OF EPIDURAL LIPOMATOSIS. PMHx of TIAs, HTN, DM  Family / Caregiver Present: Yes (wife arrived mid-session)  Referring Practitioner: SASHA Kimbrough  Diagnosis: Lumbar stenosis with neurogenic claudication  Subjective  Subjective: Pt received seated EOB and agreeable to OT tx. Pt complains of 8/10 back pain.      Social/Functional History  Social/Functional History  Lives With: Spouse  Type of Home: House  Home Layout: Able to Live on Main level with bedroom/bathroom  Bathroom Shower/Tub: Walk-in shower  Bathroom Toilet: Standard  Bathroom Equipment: (none)  Home Equipment:  (none)  ADL Assistance: Needs assistance (Wife helps put socks and shoes, pants on)  Homemaking Assistance: Needs assistance  Ambulation Assistance: Independent  Transfer Assistance: Needs assistance (pt reports wife provides assist with getting LEs into bed at home; otherwise, pt indep)  Active : Yes  Occupation: Part time employment  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  Additional Comments: wife works as RN on the weekends       Objective        Observation/Palpation  Observation: IV to L  Safety Devices  Type of Devices: Bed alarm in place;Call light within reach;Gait belt;Left in bed;Nurse notified  Balance  Standing: With support (using RW)  Toilet Transfers  Toilet - Technique: Ambulating  Equipment Used: Standard toilet  Toilet Transfer: Stand by assistance     ADL  Grooming: Setup;Supervision;Verbal cueing  Grooming Skilled Clinical Factors: seated EOB to wash face and apply deoderant  UE Bathing: Setup;Minimal assistance  UE Bathing Skilled Clinical Factors: Min A to wash back, pt wiped chest, arms, amd underarns with bath wipes seated OEB  LE Bathing: Setup;Stand by assistance  LE Bathing Skilled Clinical Factors: Pt able to wash front and rear evie-area with bath wipes while in stance with SBA. Pt able to wipe BLEs and feet using figure 4 technique  UE Dressing: Minimal assistance  UE Dressing Skilled Clinical Factors: min A to lamar down at EOB  LE Dressing: Setup;Supervision  LE Dressing Skilled Clinical Factors: pt able to doff and lamar socks seated EOB using figure 4 technique  Toileting: Supervision  Toileting Skilled Clinical Factors: sup to stand at toilet     Activity Tolerance  Activity Tolerance: Patient tolerated treatment well     Transfers  Sit to stand: Stand by assistance  Stand to sit: Stand by assistance     Cognition  Overall Cognitive Status: Exceptions  Following Commands:  Follows one step commands with increased time  Attention Span: Attends with cues to redirect  Memory:  (unable to recall spinal precautions)  Safety Judgement: Decreased awareness of need for safety  Insights: Decreased awareness of deficits  Initiation: Requires cues for some  Sequencing: Requires cues for some  Cognition Comment: patient reports planning to return to playing keyboard in a 2 weeks - education provided on carrying/lifting equipment  Orientation  Overall Orientation Status: Within Functional Limits  Orientation Level: Oriented X4                  Education Given To: Patient  Education Provided: Role of Therapy;Plan of Care;Precautions; ADL Adaptive Strategies;Transfer Training  Education Method: Verbal;Demonstration  Barriers to Learning: Cognition  Education Outcome: Verbalized understanding;Continued education needed                       AM-PAC Score        AM-PAC Inpatient Daily Activity Raw Score: 20 (11/20/22 0906)  AM-PAC Inpatient ADL T-Scale Score : 42.03 (11/20/22 0906)  ADL Inpatient CMS 0-100% Score: 38.32 (11/20/22 0906)  ADL Inpatient CMS G-Code Modifier : CJ (11/20/22 30)    Goals  Short Term Goals  Time Frame for Short Term Goals: by discharge  Short Term Goal 1: Pt will perform LB dressing with Min A and AE PRN- met 11/20  Short Term Goal 2: Pt will perform toilet transfer with SBA- met 11/20  Short Term Goal 3: Pt will perform log roll techique for supine to sit with supervison and no cues- ongoing  Short Term Goal 4: Pt will stand 5 mins with SBA while completing grooming task- ongoing  Short Term Goal 5: Pt to perform toilet transfer with mod I.   Patient Goals   Patient goals : to go home       Therapy Time   Individual Concurrent Group Co-treatment   Time In 0806         Time Out 0902         Minutes 56         Timed Code Treatment Minutes: [id number], [place]

## 2022-11-20 NOTE — PROGRESS NOTES
Patient is alert and oriented. VSS, pain control an issue throughout night. Medicated per mar. Patient able to ambulate in hallway last night and to restroom. Patient voiding in adequately during shift. No BM. Patient c/o sore in inside L nostril, states he normally takes mupirocin.  Dr. Denice Negron notified, no new orders, stated \"can wait'

## 2022-11-20 NOTE — PROGRESS NOTES
Assessment complete, see flow sheet. OT in the room working with the pt and getting him bathed. The pt c/o pain 7-8/10. The pt states he hasn't given himself any bolus insulin from his pump and that it is continuing to run a basal dose of 2 units every hour. The pt refuses to use his walker when up ambulating and also refuses to use the gait belt. When staff encourages him to use those safety devices the pt will get up and walk anyway. The pt is steady on his feet. The pt was given ordered IV Dilaudid per his request, See MAR. Before leaving the room the pt denied any other needs at this time. Call light is in reach and alarm is on. The pt's back incisions remain clean dry and intact. I will continue to follow throughout the shift.   Klaus Castro RN

## 2022-11-20 NOTE — PLAN OF CARE
Problem: Discharge Planning  Goal: Discharge to home or other facility with appropriate resources  11/20/2022 1110 by Amanda Hughes RN  Outcome: Progressing  11/19/2022 2342 by Laila Menard RN  Outcome: Progressing     Problem: Chronic Conditions and Co-morbidities  Goal: Patient's chronic conditions and co-morbidity symptoms are monitored and maintained or improved  11/20/2022 1110 by Amanda Hughes RN  Outcome: Progressing  11/19/2022 2342 by Laila Menard RN  Outcome: Progressing     Problem: Pain  Goal: Verbalizes/displays adequate comfort level or baseline comfort level  11/20/2022 1110 by Amanda Hughes RN  Outcome: Progressing  11/19/2022 2342 by Laila Menard RN  Outcome: Progressing     Problem: Safety - Adult  Goal: Free from fall injury  11/20/2022 1110 by Amanda Hughes RN  Outcome: Progressing  11/19/2022 2342 by Laila Menard RN  Outcome: Progressing     Problem: ABCDS Injury Assessment  Goal: Absence of physical injury  11/20/2022 1110 by Amanda Hughes RN  Outcome: Progressing  11/19/2022 2342 by Laila Menard RN  Outcome: Progressing

## 2022-11-20 NOTE — PROGRESS NOTES
Pt resting in bed, eyes closed, breathing easily, and in no signs of distress. Call light in reach. I will continue to follow throughout the shift.   Kam Guerrero RN

## 2022-11-21 ENCOUNTER — HOSPITAL ENCOUNTER (INPATIENT)
Age: 67
LOS: 4 days | Discharge: HOME HEALTH | DRG: 561 | End: 2022-11-25
Attending: PHYSICAL MEDICINE & REHABILITATION | Admitting: PHYSICAL MEDICINE & REHABILITATION
Payer: MEDICARE

## 2022-11-21 VITALS
WEIGHT: 217 LBS | DIASTOLIC BLOOD PRESSURE: 75 MMHG | OXYGEN SATURATION: 95 % | BODY MASS INDEX: 32.14 KG/M2 | HEIGHT: 69 IN | SYSTOLIC BLOOD PRESSURE: 138 MMHG | TEMPERATURE: 99.2 F | HEART RATE: 80 BPM | RESPIRATION RATE: 16 BRPM

## 2022-11-21 DIAGNOSIS — M48.062 LUMBAR STENOSIS WITH NEUROGENIC CLAUDICATION: Primary | ICD-10-CM

## 2022-11-21 PROBLEM — R53.81 DEBILITY: Status: ACTIVE | Noted: 2022-11-21

## 2022-11-21 LAB
GLUCOSE BLD-MCNC: 113 MG/DL (ref 70–99)
GLUCOSE BLD-MCNC: 130 MG/DL (ref 70–99)
GLUCOSE BLD-MCNC: 135 MG/DL (ref 70–99)
PERFORMED ON: ABNORMAL
SARS-COV-2, NAAT: NOT DETECTED

## 2022-11-21 PROCEDURE — 6370000000 HC RX 637 (ALT 250 FOR IP): Performed by: INTERNAL MEDICINE

## 2022-11-21 PROCEDURE — 97530 THERAPEUTIC ACTIVITIES: CPT

## 2022-11-21 PROCEDURE — 6370000000 HC RX 637 (ALT 250 FOR IP): Performed by: NURSE PRACTITIONER

## 2022-11-21 PROCEDURE — 1280000000 HC REHAB R&B

## 2022-11-21 PROCEDURE — 6360000002 HC RX W HCPCS: Performed by: PHYSICIAN ASSISTANT

## 2022-11-21 PROCEDURE — 6370000000 HC RX 637 (ALT 250 FOR IP): Performed by: PHYSICIAN ASSISTANT

## 2022-11-21 PROCEDURE — 99024 POSTOP FOLLOW-UP VISIT: CPT | Performed by: NURSE PRACTITIONER

## 2022-11-21 PROCEDURE — 6370000000 HC RX 637 (ALT 250 FOR IP): Performed by: PHYSICAL MEDICINE & REHABILITATION

## 2022-11-21 PROCEDURE — 6370000000 HC RX 637 (ALT 250 FOR IP): Performed by: HOSPITALIST

## 2022-11-21 PROCEDURE — 99231 SBSQ HOSP IP/OBS SF/LOW 25: CPT | Performed by: PHYSICAL MEDICINE & REHABILITATION

## 2022-11-21 PROCEDURE — 2580000003 HC RX 258: Performed by: PHYSICAL MEDICINE & REHABILITATION

## 2022-11-21 PROCEDURE — 97535 SELF CARE MNGMENT TRAINING: CPT

## 2022-11-21 PROCEDURE — 87635 SARS-COV-2 COVID-19 AMP PRB: CPT

## 2022-11-21 PROCEDURE — 97116 GAIT TRAINING THERAPY: CPT

## 2022-11-21 RX ORDER — ONDANSETRON 2 MG/ML
4 INJECTION INTRAMUSCULAR; INTRAVENOUS EVERY 6 HOURS PRN
Status: DISCONTINUED | OUTPATIENT
Start: 2022-11-21 | End: 2022-11-25 | Stop reason: HOSPADM

## 2022-11-21 RX ORDER — ROSUVASTATIN CALCIUM 10 MG/1
10 TABLET, COATED ORAL DAILY
Status: CANCELLED | OUTPATIENT
Start: 2022-11-22

## 2022-11-21 RX ORDER — SODIUM CHLORIDE 0.9 % (FLUSH) 0.9 %
5-40 SYRINGE (ML) INJECTION EVERY 12 HOURS SCHEDULED
Status: DISCONTINUED | OUTPATIENT
Start: 2022-11-21 | End: 2022-11-25 | Stop reason: HOSPADM

## 2022-11-21 RX ORDER — LOSARTAN POTASSIUM AND HYDROCHLOROTHIAZIDE 25; 100 MG/1; MG/1
1 TABLET ORAL DAILY
Status: DISCONTINUED | OUTPATIENT
Start: 2022-11-22 | End: 2022-11-25 | Stop reason: HOSPADM

## 2022-11-21 RX ORDER — DEXTROSE MONOHYDRATE 100 MG/ML
INJECTION, SOLUTION INTRAVENOUS CONTINUOUS PRN
Status: DISCONTINUED | OUTPATIENT
Start: 2022-11-21 | End: 2022-11-25 | Stop reason: HOSPADM

## 2022-11-21 RX ORDER — LIDOCAINE 4 G/G
2 PATCH TOPICAL DAILY
Status: DISCONTINUED | OUTPATIENT
Start: 2022-11-22 | End: 2022-11-25 | Stop reason: HOSPADM

## 2022-11-21 RX ORDER — OXYCODONE HYDROCHLORIDE 5 MG/1
5 TABLET ORAL EVERY 4 HOURS PRN
Status: DISCONTINUED | OUTPATIENT
Start: 2022-11-21 | End: 2022-11-25 | Stop reason: HOSPADM

## 2022-11-21 RX ORDER — SUCRALFATE 1 G/1
1 TABLET ORAL EVERY 12 HOURS SCHEDULED
Status: DISCONTINUED | OUTPATIENT
Start: 2022-11-21 | End: 2022-11-25 | Stop reason: HOSPADM

## 2022-11-21 RX ORDER — METHOCARBAMOL 500 MG/1
1000 TABLET, FILM COATED ORAL EVERY 6 HOURS
Status: DISCONTINUED | OUTPATIENT
Start: 2022-11-21 | End: 2022-11-24

## 2022-11-21 RX ORDER — BLOOD-GLUCOSE SENSOR
EACH MISCELLANEOUS
COMMUNITY

## 2022-11-21 RX ORDER — ENOXAPARIN SODIUM 100 MG/ML
40 INJECTION SUBCUTANEOUS DAILY
Status: DISCONTINUED | OUTPATIENT
Start: 2022-11-22 | End: 2022-11-25 | Stop reason: HOSPADM

## 2022-11-21 RX ORDER — SODIUM CHLORIDE 0.9 % (FLUSH) 0.9 %
5-40 SYRINGE (ML) INJECTION EVERY 12 HOURS SCHEDULED
Status: CANCELLED | OUTPATIENT
Start: 2022-11-21

## 2022-11-21 RX ORDER — SODIUM CHLORIDE 0.9 % (FLUSH) 0.9 %
5-40 SYRINGE (ML) INJECTION PRN
Status: DISCONTINUED | OUTPATIENT
Start: 2022-11-21 | End: 2022-11-25 | Stop reason: HOSPADM

## 2022-11-21 RX ORDER — PANTOPRAZOLE SODIUM 40 MG/1
40 TABLET, DELAYED RELEASE ORAL
Status: CANCELLED | OUTPATIENT
Start: 2022-11-21

## 2022-11-21 RX ORDER — CITALOPRAM 40 MG/1
40 TABLET ORAL DAILY
Status: CANCELLED | OUTPATIENT
Start: 2022-11-22

## 2022-11-21 RX ORDER — METHOCARBAMOL 500 MG/1
1000 TABLET, FILM COATED ORAL EVERY 6 HOURS
Status: CANCELLED | OUTPATIENT
Start: 2022-11-21

## 2022-11-21 RX ORDER — SUCRALFATE 1 G/1
1 TABLET ORAL EVERY 12 HOURS SCHEDULED
Status: CANCELLED | OUTPATIENT
Start: 2022-11-21

## 2022-11-21 RX ORDER — DIPHENHYDRAMINE HCL 25 MG
25 TABLET ORAL EVERY 8 HOURS PRN
Status: CANCELLED | OUTPATIENT
Start: 2022-11-21

## 2022-11-21 RX ORDER — POLYETHYLENE GLYCOL 3350 17 G/17G
17 POWDER, FOR SOLUTION ORAL DAILY PRN
Status: CANCELLED | OUTPATIENT
Start: 2022-11-21

## 2022-11-21 RX ORDER — SIMETHICONE 80 MG
80 TABLET,CHEWABLE ORAL 3 TIMES DAILY PRN
Status: CANCELLED | OUTPATIENT
Start: 2022-11-21

## 2022-11-21 RX ORDER — DEXTROSE MONOHYDRATE 100 MG/ML
INJECTION, SOLUTION INTRAVENOUS CONTINUOUS PRN
Status: CANCELLED | OUTPATIENT
Start: 2022-11-21

## 2022-11-21 RX ORDER — CALCIUM CARBONATE 200(500)MG
1000 TABLET,CHEWABLE ORAL 3 TIMES DAILY PRN
Status: DISCONTINUED | OUTPATIENT
Start: 2022-11-21 | End: 2022-11-25 | Stop reason: HOSPADM

## 2022-11-21 RX ORDER — SENNA AND DOCUSATE SODIUM 50; 8.6 MG/1; MG/1
1 TABLET, FILM COATED ORAL 2 TIMES DAILY
Status: DISCONTINUED | OUTPATIENT
Start: 2022-11-21 | End: 2022-11-25 | Stop reason: HOSPADM

## 2022-11-21 RX ORDER — DIPHENHYDRAMINE HCL 25 MG
25 TABLET ORAL EVERY 8 HOURS PRN
Status: DISCONTINUED | OUTPATIENT
Start: 2022-11-21 | End: 2022-11-25 | Stop reason: HOSPADM

## 2022-11-21 RX ORDER — PANTOPRAZOLE SODIUM 40 MG/1
40 TABLET, DELAYED RELEASE ORAL
Status: DISCONTINUED | OUTPATIENT
Start: 2022-11-22 | End: 2022-11-25 | Stop reason: HOSPADM

## 2022-11-21 RX ORDER — SIMETHICONE 80 MG
80 TABLET,CHEWABLE ORAL 3 TIMES DAILY PRN
Status: DISCONTINUED | OUTPATIENT
Start: 2022-11-21 | End: 2022-11-25 | Stop reason: HOSPADM

## 2022-11-21 RX ORDER — ONDANSETRON 2 MG/ML
4 INJECTION INTRAMUSCULAR; INTRAVENOUS EVERY 6 HOURS PRN
Status: CANCELLED | OUTPATIENT
Start: 2022-11-21

## 2022-11-21 RX ORDER — SODIUM CHLORIDE 0.9 % (FLUSH) 0.9 %
5-40 SYRINGE (ML) INJECTION PRN
Status: CANCELLED | OUTPATIENT
Start: 2022-11-21

## 2022-11-21 RX ORDER — ONDANSETRON 4 MG/1
4 TABLET, ORALLY DISINTEGRATING ORAL EVERY 8 HOURS PRN
Status: CANCELLED | OUTPATIENT
Start: 2022-11-21

## 2022-11-21 RX ORDER — CALCIUM CARBONATE 200(500)MG
1000 TABLET,CHEWABLE ORAL 3 TIMES DAILY PRN
Status: CANCELLED | OUTPATIENT
Start: 2022-11-21

## 2022-11-21 RX ORDER — METOPROLOL SUCCINATE 50 MG/1
100 TABLET, EXTENDED RELEASE ORAL DAILY
Status: DISCONTINUED | OUTPATIENT
Start: 2022-11-22 | End: 2022-11-25 | Stop reason: HOSPADM

## 2022-11-21 RX ORDER — ONDANSETRON 4 MG/1
4 TABLET, ORALLY DISINTEGRATING ORAL EVERY 8 HOURS PRN
Status: DISCONTINUED | OUTPATIENT
Start: 2022-11-21 | End: 2022-11-25 | Stop reason: HOSPADM

## 2022-11-21 RX ORDER — LOSARTAN POTASSIUM AND HYDROCHLOROTHIAZIDE 25; 100 MG/1; MG/1
1 TABLET ORAL DAILY
Status: CANCELLED | OUTPATIENT
Start: 2022-11-22

## 2022-11-21 RX ORDER — TAMSULOSIN HYDROCHLORIDE 0.4 MG/1
0.4 CAPSULE ORAL DAILY
Status: CANCELLED | OUTPATIENT
Start: 2022-11-22

## 2022-11-21 RX ORDER — TAMSULOSIN HYDROCHLORIDE 0.4 MG/1
0.4 CAPSULE ORAL DAILY
Status: DISCONTINUED | OUTPATIENT
Start: 2022-11-22 | End: 2022-11-25 | Stop reason: HOSPADM

## 2022-11-21 RX ORDER — SENNA AND DOCUSATE SODIUM 50; 8.6 MG/1; MG/1
1 TABLET, FILM COATED ORAL 2 TIMES DAILY
Status: CANCELLED | OUTPATIENT
Start: 2022-11-21

## 2022-11-21 RX ORDER — POLYETHYLENE GLYCOL 3350 17 G/17G
17 POWDER, FOR SOLUTION ORAL DAILY PRN
Status: DISCONTINUED | OUTPATIENT
Start: 2022-11-21 | End: 2022-11-25 | Stop reason: HOSPADM

## 2022-11-21 RX ORDER — CITALOPRAM 20 MG/1
40 TABLET ORAL DAILY
Status: DISCONTINUED | OUTPATIENT
Start: 2022-11-22 | End: 2022-11-25 | Stop reason: HOSPADM

## 2022-11-21 RX ORDER — OXYCODONE HYDROCHLORIDE 5 MG/1
10 TABLET ORAL EVERY 4 HOURS PRN
Status: CANCELLED | OUTPATIENT
Start: 2022-11-21

## 2022-11-21 RX ORDER — LIDOCAINE 4 G/G
2 PATCH TOPICAL DAILY
Status: CANCELLED | OUTPATIENT
Start: 2022-11-22

## 2022-11-21 RX ORDER — METOPROLOL SUCCINATE 100 MG/1
100 TABLET, EXTENDED RELEASE ORAL DAILY
Status: CANCELLED | OUTPATIENT
Start: 2022-11-22

## 2022-11-21 RX ORDER — ROSUVASTATIN CALCIUM 5 MG/1
10 TABLET, COATED ORAL DAILY
Status: DISCONTINUED | OUTPATIENT
Start: 2022-11-22 | End: 2022-11-25 | Stop reason: HOSPADM

## 2022-11-21 RX ORDER — EZETIMIBE 10 MG/1
10 TABLET ORAL DAILY
Status: DISCONTINUED | OUTPATIENT
Start: 2022-11-22 | End: 2022-11-25 | Stop reason: HOSPADM

## 2022-11-21 RX ORDER — OXYCODONE HYDROCHLORIDE 5 MG/1
5 TABLET ORAL EVERY 4 HOURS PRN
Status: CANCELLED | OUTPATIENT
Start: 2022-11-21

## 2022-11-21 RX ORDER — OXYCODONE HYDROCHLORIDE 5 MG/1
10 TABLET ORAL EVERY 4 HOURS PRN
Status: DISCONTINUED | OUTPATIENT
Start: 2022-11-21 | End: 2022-11-25 | Stop reason: HOSPADM

## 2022-11-21 RX ORDER — ENOXAPARIN SODIUM 100 MG/ML
40 INJECTION SUBCUTANEOUS DAILY
Status: CANCELLED | OUTPATIENT
Start: 2022-11-21

## 2022-11-21 RX ORDER — EZETIMIBE 10 MG/1
10 TABLET ORAL DAILY
Status: CANCELLED | OUTPATIENT
Start: 2022-11-22

## 2022-11-21 RX ADMIN — METHOCARBAMOL TABLETS 1000 MG: 500 TABLET, COATED ORAL at 09:16

## 2022-11-21 RX ADMIN — DIPHENHYDRAMINE HYDROCHLORIDE 25 MG: 25 TABLET ORAL at 21:44

## 2022-11-21 RX ADMIN — METHOCARBAMOL TABLETS 1000 MG: 500 TABLET, COATED ORAL at 14:57

## 2022-11-21 RX ADMIN — SUCRALFATE 1 G: 1 TABLET ORAL at 09:15

## 2022-11-21 RX ADMIN — PANTOPRAZOLE SODIUM 40 MG: 40 TABLET, DELAYED RELEASE ORAL at 06:48

## 2022-11-21 RX ADMIN — MUPIROCIN: 20 OINTMENT TOPICAL at 21:45

## 2022-11-21 RX ADMIN — MUPIROCIN: 20 OINTMENT TOPICAL at 09:24

## 2022-11-21 RX ADMIN — OXYCODONE 10 MG: 5 TABLET ORAL at 21:44

## 2022-11-21 RX ADMIN — METHOCARBAMOL 1000 MG: 500 TABLET ORAL at 21:44

## 2022-11-21 RX ADMIN — OXYCODONE 10 MG: 5 TABLET ORAL at 03:27

## 2022-11-21 RX ADMIN — TAMSULOSIN HYDROCHLORIDE 0.4 MG: 0.4 CAPSULE ORAL at 09:15

## 2022-11-21 RX ADMIN — EZETIMIBE 10 MG: 10 TABLET ORAL at 09:21

## 2022-11-21 RX ADMIN — SENNOSIDES AND DOCUSATE SODIUM 1 TABLET: 50; 8.6 TABLET ORAL at 21:44

## 2022-11-21 RX ADMIN — ROSUVASTATIN CALCIUM 10 MG: 10 TABLET, COATED ORAL at 09:15

## 2022-11-21 RX ADMIN — NALOXEGOL OXALATE 25 MG: 25 TABLET, FILM COATED ORAL at 06:48

## 2022-11-21 RX ADMIN — SENNOSIDES AND DOCUSATE SODIUM 1 TABLET: 50; 8.6 TABLET ORAL at 09:15

## 2022-11-21 RX ADMIN — MUPIROCIN: 20 OINTMENT TOPICAL at 13:36

## 2022-11-21 RX ADMIN — DIAZEPAM 5 MG: 5 TABLET ORAL at 06:48

## 2022-11-21 RX ADMIN — SODIUM CHLORIDE, PRESERVATIVE FREE 10 ML: 5 INJECTION INTRAVENOUS at 21:46

## 2022-11-21 RX ADMIN — SUCRALFATE 1 G: 1 TABLET ORAL at 21:44

## 2022-11-21 RX ADMIN — METHOCARBAMOL TABLETS 1000 MG: 500 TABLET, COATED ORAL at 03:27

## 2022-11-21 RX ADMIN — OXYCODONE 10 MG: 5 TABLET ORAL at 13:34

## 2022-11-21 RX ADMIN — CITALOPRAM 40 MG: 40 TABLET, FILM COATED ORAL at 09:15

## 2022-11-21 RX ADMIN — ENOXAPARIN SODIUM 40 MG: 100 INJECTION SUBCUTANEOUS at 09:22

## 2022-11-21 RX ADMIN — HYDROMORPHONE HYDROCHLORIDE 0.5 MG: 1 INJECTION, SOLUTION INTRAMUSCULAR; INTRAVENOUS; SUBCUTANEOUS at 01:15

## 2022-11-21 RX ADMIN — ALUMINUM HYDROXIDE, MAGNESIUM HYDROXIDE, AND SIMETHICONE: 200; 200; 20 SUSPENSION ORAL at 12:37

## 2022-11-21 ASSESSMENT — PAIN DESCRIPTION - ORIENTATION
ORIENTATION: MID

## 2022-11-21 ASSESSMENT — PAIN DESCRIPTION - LOCATION
LOCATION: BACK

## 2022-11-21 ASSESSMENT — PAIN DESCRIPTION - PAIN TYPE
TYPE: SURGICAL PAIN
TYPE: SURGICAL PAIN

## 2022-11-21 ASSESSMENT — PAIN SCALES - GENERAL
PAINLEVEL_OUTOF10: 8
PAINLEVEL_OUTOF10: 8
PAINLEVEL_OUTOF10: 7
PAINLEVEL_OUTOF10: 9
PAINLEVEL_OUTOF10: 8
PAINLEVEL_OUTOF10: 0
PAINLEVEL_OUTOF10: 4

## 2022-11-21 ASSESSMENT — PAIN DESCRIPTION - DESCRIPTORS
DESCRIPTORS: ACHING;SORE;STABBING
DESCRIPTORS: ACHING
DESCRIPTORS: ACHING
DESCRIPTORS: ACHING;SORE

## 2022-11-21 ASSESSMENT — PAIN DESCRIPTION - FREQUENCY
FREQUENCY: CONTINUOUS
FREQUENCY: CONTINUOUS

## 2022-11-21 ASSESSMENT — PAIN DESCRIPTION - ONSET
ONSET: ON-GOING
ONSET: ON-GOING

## 2022-11-21 ASSESSMENT — PAIN - FUNCTIONAL ASSESSMENT: PAIN_FUNCTIONAL_ASSESSMENT: PREVENTS OR INTERFERES SOME ACTIVE ACTIVITIES AND ADLS

## 2022-11-21 ASSESSMENT — LIFESTYLE VARIABLES: HOW OFTEN DO YOU HAVE A DRINK CONTAINING ALCOHOL: NEVER

## 2022-11-21 NOTE — PROGRESS NOTES
NURSING ASSESSMENT: ARU ADMISSION  The [address] [place] [address] Dx/Hx: Alfonso Arreola [[id number]]   :1955  [facility]:[id number]  Date of Admit: 2022  Room #: 3109/3109-01    Subjective:   Patient admitted to [id number]@ from 5 tower via w/c. Alert and oriented x4. Oriented to room and call light system. Oriented to rehab routine and therapy schedules. Informed about care conferences and ordering of meals. Drug / Medication Review:   Medications were reviewed by RN at time of admission  [x]  No potential or actual clinically significant medication issues were noted.      []   Yes, a clinically significant medication issue was identified                 []  Adverse Drug Event:                  []  Allergy:                  []  Side Effect:                  []  Ineffective Therapy:                  []  Drug Interaction:                 []  Duplicated Therapy:                 []  Untreated Indication:                  []  Non-adherence:                 []  Other:                  Nursing/Pharmacy contacted the physician:     Date:              Time:                  Actions recommended by physician were completed:   Date :            Time:    4 Eyes Skin Assessment   The patient is being assessed for: Admission     I agree that 2 RN's have performed a thorough Head to Toe Skin Assessment on the patient. ALL assessment sites listed below have been assessed. Areas assessed by both nurses:   [x]   Head, Face, and Ears   [x]   Shoulders, Back, and Chest, Abdomen  [x]   Arms, Elbows, and Hands   [x]   Coccyx, Sacrum, and Ischium  [x]   Legs, Feet, and Heel     Does the Patient have Skin Breakdown?   2 surgical incision on mid back          Real Prevention initiated:  Yes  Wound Care Orders initiated:  Yes ordered      [address]  nurse consulted for Pressure Injury (Stage 3,4, Unstageable, DTI, NWPT, Complex wounds)and New or Established Ostomies: Not Applicable    Primary Nurse eSignature:  Didier Mehta RN   Co-signer eSignature: Claudio Lewis RN

## 2022-11-21 NOTE — PROGRESS NOTES
ARU Admission Assessment    Ethnicity  \"Are you of , /a, or [place] origin? \"  Check all that apply:  [x] A. No, not of , /a, or [place] Origin  [] B.  Yes, [place], [place] American, Chicano/a  [] C.  Yes, [address] [place] [address]  [] D.  Yes, [place]  [] E.  Yes, another , , or [place]an origin  [] X. Patient unable to respond  [] Y. Patient declines to respond    Race  \"What is your race? \"  Check all that apply:  [x] A. White  [] B. Black or   [] C. American [place] [facility] or [place] Native  [] D.  [place] [facility]  [] E. [place]  [] F. [place]se  [] G. [place]  [] Jluis Reagin  [] I. [place]  [] J.  Other   [] K.   [] L. Andorran or [place]icit[place]  [] M. Algerian  [] N. Other [place]  [] X. Patient unable to respond  [] Y. Patient declines to respond  [] Z. None of the above    Language  A. \"What is your preferred language? \"   Litzy Bowens. \"Do you need or want an  to communicate with a doctor or health care staff? \"  Check only one:  [x] 0. No  [] 1. Yes  [] 9. Unable to determine    Transportation  \"Has lack of transportation kept you from medical appointments, meetings, work, or from getting things needed for daily living? \"Check all that apply:  [] A.  Yes, it has kept me from medical appointments or from getting my medications  [] B.  Yes, it has kept me from non-medical meetings, appointments, work, or from getting things that I need  [x] C.  No  [] X. Patient unable to respond  [] Y. Patient declines to respond    Hearing  Ability to hear (with hearing aid or hearing appliances if normally used)  [x]  0. Adequate - no difficulty in normal conversation, social interaction, listening to TV  []  1. Minimal difficulty - difficulty in some environments (e.g. when person speaks softly or setting is noisy)  []  2. Moderate difficulty - speaker has to increase volume and speak distinctly   []  3.   Highly impaired - absence of useful hearing    Vision  Ability to see in adequate light (with glasses or other visual appliances)  []  0. Adequate - sees fine detail, such as regular print in newspapers/books  [x]  1. Impaired - sees large print, but not regular print in newspapers/books  []  2. Moderately impaired - limited vision; not able to see newspaper headlines but can identify objects  []  3. Highly impaired - object identification in question, but eyes appear to follow objects  []  4. Severely impaired - no vision or sees only light, colors, or shapes; eyes do not appear to follow objects    Health Literacy  \"How often do you need to have someone help you when you read instructions, pamphlets, or other written material from your doctor or pharmacy? \"  [x]  0. Never  []  1. Rarely  []  2. Sometimes  []  3. Often  []  4. Always  []  8. Patient unable to respond    BIMS - **Must be completed in the flowsheet at admission prior to proceeding with Delirium Assessment**  [x] BIMS completed in flowsheet at admission    Signs and Symptoms of Delirium  A. Acute Onset Mental Status Change - Is there evidence of an acute change in mental status from the patient's baseline? [x] 0. No  [] 1. Yes    B. Inattention - Did the patient have difficulty focusing attention, for example being easily distractible or having difficulty keeping track of what was being said? [x]  0. Behavior not present  []  1. Behavior continuously present, does not fluctuate  []  2. Behavior present, fluctuates (comes and goes, changes in severity)    C. Disorganized thinking - Was the patient's thinking disorganized or incoherent (rambling or irrelevant conversation, unclear or illogical flow of ideas, or unpredictable switching from subject to subject)? [x]  0. Behavior not present  []  1. Behavior continuously present, does not fluctuate  []  2. Behavior present, fluctuates (comes and goes, changes in severity)    D.   Altered level of consciousness - Did the patient have altered level of consciousness as indicated by any of the following criteria? Vigilant - startled easily to any sound or touch  Lethargic - repeatedly dozed off while being asked questions, but responded to voice or touch  Stuporous - very difficulty to arouse and keep aroused for the interview  Comatose - could not be aroused  [x]  0. Behavior not present  []  1. Behavior continuously present, does not fluctuate  []  2. Behavior present, fluctuates (comes and goes, changes in severity)    Mood    \"Over the last 2 weeks, have you been bothered by any of the following problems?\" 1. Symptom Presence    0 = No  1 = Yes  9 = No Response 2. Symptom Frequency    0 = Never or 1 day  1 = 2-6 days (several days)  2 = 7-11 days (half or more of the days)  3 = 12-14 days (nearly every day)  **Leave blank if 'No Reponse'**      Enter scores in boxes    Column 1 Column 2   Little interest or pleasure in doing things   0 1   Feeling down, depressed, or hopeless   1 1   **If either A or B in column 2 is coded 2 or 3, CONTINUE asking the questions below. If not, END the interview. **     Trouble falling or staying asleep, or sleeping too much       Feeling tired or having little energy       Poor appetite or overeating       Feeling bad about yourself - or that you are a failure or have let yourself or your family down       Trouble concentrating on things, such as reading the newspaper or watching television       Moving or speaking so slowly that other people could have noticed. Or the opposite- being so fidgety or restless that you have been moving around a lot more than usual.       Thoughts that you would be better off dead, or of hurting yourself in some way. Total Severity: Add scores for all frequency responses in column 2 (possible score 0-27, or enter 99 if unable to complete (if symptom frequency (column 2) is blank for 3 or more items).         Social Isolation  \"How often do you feel lonely or isolated from those around you? \"  [x] 0. Never  [] 1. Rarely  [] 2. Sometimes  [] 3. Often  [] 4. Always  [] 7. Patient declines to respond  [] 8. Patient unable to respond    Pain Effect on Sleep  \"Over the past 5 days, how much of the time has pain made it hard for you to sleep at night? \"  []  0. Does not apply - I have not had any pain or hurting in the past 5 days  []  1. Rarely or not at all  []  2. Occasionally  [x]  3. Frequently  []  4. Almost constantly  []  8. Unable to answer    **If the patient answers \"0. Does not apply\" to this question, skip the next two \"Pain Effect. Sallyanne Chain Sallyanne Chain \" questions**    Pain Interference with Therapy Activities  \"Over the past 5 days, how often have you limited your participation in rehabilitation therapy sessions due to pain? \"  []  0. Does not apply - I have not received rehabilitation therapy in the past 5 days  []  1. Rarely or not at all  []  2. Occasionally  []  3. Frequently  [x]  4. Almost constantly  []  8. Unable to answer    Pain Interference with Day-to-Day Activities: \"Over the past 5 days, how often have you limited your day-to-day activities (excluding rehabilitation therapy session)? \"  []  1. Rarely or not at all  []  2. Occasionally  []  3. Frequently  [x]  4. Almost constantly  []  8. Unable to answer    Nutritional Approaches  Check all of the following nutritional approaches that apply on admission:  []  A. Parenteral/IV feeding (including IV fluids if needed for hydration, but not as part of dialysis/chemo)  []  B. Feeding tube (e.g., nasogastric or abdominal (PEG))  []  C. Mechanically altered diet - requires change in texture of food or liquids (e.g., pureed food, thickened liquids)  []  D. Therapeutic diet (e.g., low salt, diabetic, low cholesterol)  [x]  Z.   None of the above    High Risk Drug Classes:  Use and Indication    Is taking: Check if the pt is taking any medications by pharmacological classification, not how it is used, in the following classes  Indication noted: If column 1 is checked, check if there is an indication noted for all meds in the drug class Is taking  (check all that apply) Indication noted (check all that apply)   Antipsychotic [] []   Anticoagulant [x] [x]   Antibiotic [] []   Opioid [x] [x]   Antiplatelet [] []   Hypoglycemic (including insulin) [x] [x]   None of the above []     Special Treatments, Procedures, and Programs    Check all of the following treatments, procedures, and programs that apply on admission. On admission (check all that apply)   Cancer Treatments   A1. Chemotherapy []           A2. IV []           A3. Oral []           A10. Other []   B1. Radiation []   Respiratory Therapies   C1. Oxygen Therapy []           C2. Continuous (continuously for at least 14 hours per day) []           C3. Intermittent []           C4. High-concentration []   D1. Suctioning (Does not include oral suctioning) []           D2. Scheduled []           D3. As needed []   E1. Tracheostomy Care []   F1. Invasive Mechanical Ventilator (ventilator or respirator) []   G1. Non-invasive Mechanical Ventilator []           G2. BiPAP []           G3. CPAP []   Other   H1. IV Medications (Do not include sub Q pumps, flushes, Dextrose 50% or lactated ringers) []           H2. Vasoactive medications []           H3. Antibiotics []           H4. Anticoagulation []           H10. Other []   I1. Transfusions []   J1. Dialysis []           J2. Hemodialysis []           J3. Peritoneal dialysis []   O1. IV access (including a catheter in a vein) []           O2. Peripheral []           O3. Midline []           O4. Central (PICC, tunneled, port) []      None of the above (select if no Cancer, Respiratory, or Other boxes are checked) [x]     The above items have been reviewed and updated as necessary, and are accurate for the admission assessment period.     Reviewing RN:

## 2022-11-21 NOTE — PLAN OF CARE
ARU PATIENT TREATMENT PLAN  The [address], [address]  [phone]    Hortensiarober Camarena    : 1955  Acct #: [de-identified]  MRN: [id number]  PHYSICIAN:  Jonelle Romero DO  Primary Problem    Patient Active Problem List   Diagnosis    Lumbar stenosis with neurogenic claudication    Status post lumbar spine operative procedure for decompression of spinal cord    Debility       Rehabilitation Diagnosis:  Orthopedic, 8.9, Other Orthopedic  ADMIT DATE:2022    Patient Goals: to go home  Admitting Impairments: Decreased functional mobility ; Decreased endurance;Decreased ADL status; Decreased balance;Decreased strength;Decreased safe awareness;Decreased high-level IADLs;Decreased posture  Activity Restrictions: No Bending, No Twisting, No Lifting, Pt has an insulin pump  Participation Limitations: None   CARE PLAN   NURSING:  Stefanie Camarena while on this unit will:  [] Be continent of bowel and bladder     [x] Have an adequate number of bowel movements  [] Urinate with no urinary retention >300ml in bladder  [] Complete bladder protocol with packer removal  [] Maintain O2 SATs at ___%  [x] Have pain managed while on ARU       [] Be pain free by discharge   [x] Have no skin breakdown while on ARU  [] Have improved skin integrity via wound measurements  [x] Have no signs/symptoms of infection at the wound site  [x] Be free from injury during hospitalization   [x] Complete education with patient/family with understanding demonstrated for:  [] Adjustment   [] Other:     Nursing Interventions will include:  [] bowel/bladder training   [x] education for medical assistive devices   [x] medication education   [x] O2 saturation management   [x] energy conservation   [x] stress management techniques   [x] fall prevention   [x] alarms protocol   [x] seating and positioning   [x] skin/wound care   [x] pressure relief instruction   [x] dressing changes     [x] infection protection   [x] DVT prophylaxis  [x] assistance with in room safety with transfers to bed, toilet, wheelchair, shower   [] bathroom activities and hygiene  [] Other:    Patient/Caregiver Education for:  [x] Disease/sustained injury/management     [] Medication Use  [x] Surgical intervention  [x] Safety  [x] Body mechanics and or joint protection  [x] Health maintenance     [] Other:     PHYSICAL THERAPY:  Goals:                  Short Term Goals  Time Frame for Short Term Goals: 7 days  Short Term Goal 1: pt will perform bed mobility with mod I  Short Term Goal 2: pt will perform functional transfers with LRAD and mod I  Short Term Goal 3: pt will ambulate 150' with LRAD and mod I  Short Term Goal 4: pt will complete 12 steps with no rail and mod I               These goals were reviewed with this patient at the time of assessment and Bernard Correa is in agreement.      Plan of Care: Pt to be seen 5 out of 7 days per week per ARU protocol (90 minutes with PT)                  Current Treatment Recommendations: Functional mobility training, Transfer training, Balance training, Gait training, Stair training, Patient/Caregiver education & training, Therapeutic activities, Equipment evaluation, education, & procurement, Strengthening, Home exercise program, Safety education & training, Endurance training, Neuromuscular re-education    OCCUPATIONAL THERAPY:  Goals:             Short Term Goals  Time Frame for Short Term Goals: 7 days  Short Term Goal 1: Pt will complete LE dressing w/ Mod I  Short Term Goal 2: Pt will complete toilet transfer w/ Mod I  Short Term Goal 3: Pt will complete toileting w/ Mod I  Short Term Goal 4: Pt will independently complete grooming in stance at sink  Short Term Goal 5: Pt will maintain spinal precautions during 100% of ADL tasks :    :  These goals were reviewed with this patient at the time of assessment and Bernard Correa is in agreement    Plan of Care:  Pt to be seen 5 out of 7 days per week per ARU protocol (90 minutes with OT)       SPEECH THERAPY: Goals will be left blank if speech is not following this patient. Goals:                                                                             Plan of Care:  Pt to be seen 5 out of 7 days per week per ARU protocol (0 minutes with SLP)    Therapy Treatments will include:  [x]  therapeutic exercises    [x]  gait training     [x]  neuromuscular re-ed                            [x]  transfer training             [x] community reintegration    [x] bed mobility                          []  w/c mobility and training  [x]  self care    [x]home mgmt    []  cognitive training            []  energy conservation        []  dysphagia tx    []  speech/language/communication therapy   []  group therapy    [x]  patient/family education    [] Other:    CASE MANAGEMENT:  Goals: Assist patient/family with discharge planning, patient/family counseling, and coordination with insurance during ARU stay.     Admission Period/Goal QM SCORES  QM Admit/Goal Score   Eating CARE Score: 6 / Discharge Goal: Independent   Oral Hygiene CARE Score: 4 / Discharge Goal: Independent   Shower/Bathing CARE Score: 1 / Discharge Goal: Independent   UB Dressing CARE Score: 3 / Discharge Goal: Independent   LB Dressing CARE Score: 4 / Discharge Goal: Independent   Putting on/off Footwear CARE Score: 3 / Discharge Goal: Independent   Toileting Hygiene CARE Score: 4 / Discharge Goal: Independent   Bladder Continence      Bowel Continence      Toilet Transfers CARE Score: 4 / Discharge Goal: Independent   Shower/Bathe Self  CARE Score: 1 / Discharge Goal: Independent   Rolling Left and Right CARE Score: 4 / Discharge Goal: Independent   Sit to Lying CARE Score: 4 / Discharge Goal: Independent   Lying to Sitting on Bedside CARE Score: 4 / Discharge Goal: Independent   Sit to Stand CARE Score: 4 / Discharge Goal: Independent   Chair/Bed to Chair Transfer CARE Score: 4 / Discharge Goal: Independent   Car Transfers CARE Score: 4 / Discharge Goal: Independent   Walk 10 Feet CARE Score: 4 / Discharge Goal: Independent   Walk 50 Feet with Two Turns CARE Score: 4 / Discharge Goal: Independent   Walk 150 Feet CARE Score: 4 / Discharge Goal: Independent   Walk 10 Feet on Uneven Surfaces CARE Score: 4 / Discharge Goal: Independent   1 Step (Curb) CARE Score: 4 / Discharge Goal: Independent   4 Steps CARE Score: 4 / Discharge Goal: Independent   12 Steps CARE Score: 4 / Discharge Goal: Independent   Picking up Object from Floor CARE Score: 88 / Discharge Goal: Not Attempted   Wheel 50 Feet with 2 Turns CARE Score: 9 / Discharge Goal: Not Applicable   Type         [] Manual        [] Motorized        [] N/A   Wheel 150 Feet CARE Score: 9 / Discharge Goal: Not Applicable   Type         [] Manual        [] Motorized        [] N/A        Angelita Blunt will be seen a minimum of 3 hours of therapy per day, a minimum of 5 out of 7 days per week (please see above for specific treatment plan per PT/OT/SLP). [] In this rare instance due to the nature of this patient's medical involvement, this patient will be seen 15 hours per week (900 minutes within a 7day period). In addition, dietician/nutritionist may monitor calorie count as well as intake and collaboratively work with SLP on dietary upgrades. Neuropsychology/Psychology may evaluate and provide necessary support.     Medical issues being managed closely and that require 24hour availability of a physician:  [] Swallowing Precautions  [x] Bowel/Bladder Fx  [] Weight bearing precautions  [] Wound Care    [x] Pain Mgmt   [x] Infection Protection  [x] DVT Prophylaxis   [x] Fall Precautions  [x] Fluid/Electrolyte/Nutrition Balance  [] Voice Protection   [] Respiratory  [] Other:    Medical Prognosis: [x] Good  [] Fair    [] Guarded   Total expected IRF days 7  Anticipated discharge destination:   [] Home Independently   [x] Home Modified Independent  [] Home with supervision    []SNF     [] Other                                           Physician anticipated functional outcomes:Pt will progress to a level of MOD I for all functional mobility and ADLs to allow for a safe return home. IPOC brief synthesis: Elaina Medina is a 61 y.o. male, past medical history of type 2 diabetes, hypertension, hyperlipidemia who has been having chronic thoracic and lumbar pain with lower extremity weakness. He had an MRI done which demonstrated epidural lipomatosis severe spinal stenosis and cord compression from the T5 T6-T9 levels has severe central stenosis at L3-L4 and L4-L5 with moderate to severe stenosis at L2-L3. This likely secondary to a combination of epidural lipomatosis and facet arthropathy. Patient has been seen neurosurgery and had failed outpatient conservative management. He underwent T5-T9 decompression with resection of epidural lipomatosis with L2-L5 decompression with resection of epidural lipomatosis on 11/15. This initial ARU patient treatment plan of care, together with the IPOC & the Education plan, form the foundation for the patient's plan of care. Weekly patient care conferences are held to evaluate progress towards the initial treatment plan & goals.     I have reviewed this initial plan of care and agree with its contents:    Title   Name    Date    Time    Physician: TESSY [email or web address]  PM&R  11/23/2022  11:48 AM      Case Mgmt: URDY Adams 11/22/22 0852    OT: ERA Mariscal/L, [address] [facility]  11/22/2022 1668    PT: Constantino Castro PT, DPT 11/22/22 2551    RN: Kailey Biggs RN 11/21/2022 3623    ARU Supervisor: Segun Gallego, PT, DPT 11/23/2022 @ [phone]

## 2022-11-21 NOTE — PROGRESS NOTES
NEUROSURGERY POST-OP PROGRESS NOTE    Patient Name: Uriah Bah YOB: 1955   Sex: Male Age: 61 yrs     Medical Record Number: [id number] Acct Number: [de-identified]   Room Number: 8846/2256-52 Hospital Day: Hospital Day: 7     Interval History:  Post-operative Day# 6 s/p Procedure(s) (LRB):  T5-T9 DECOMPRESSION WITH RESECTION OF EPIDURAL LIPOMATOSIS WITH L2-L5 DECOMPRESSION WITH RESECTION OF EPIDURAL LIPOMATOSIS (N/A)    Subjective: Pt still c/o of sever incisional pain when he moves around. He lives in 2 story and he is afraid he will not be able to do all 13 steps every day due to his pain being so bad. Objective:    VITAL SIGNS   BP (!) 122/56 Comment: Nurse notified  Pulse 70   Temp 99 °F (37.2 °C) (Oral)   Resp 16   Ht 5' 8.5\" (1.74 m)   Wt 217 lb (98.4 kg)   SpO2 95%   BMI 32.51 kg/m²    Height Height: 5' 8.5\" (174 cm)   Weight Weight: 217 lb (98.4 kg)        Allergies Allergies   Allergen Reactions    Acetaminophen Other (See Comments)     Patient states Acetaminophen affects his glucose monitor    Atorvastatin Other (See Comments) and Myalgia       Joint pain      Ciprofloxacin Other (See Comments)     Joint and muscle pain       Cortisone Other (See Comments)     Severe HYPERGLYCEMIA      Gabapentin Other (See Comments)     Other reaction(s): Other (See Comments)  Increased sugar readings  Increased sugar readings  Increased sugar readings  Increased sugar readings      Hydrocodone Other (See Comments)     Agitation, unable to sleep    Liraglutide Other (See Comments)     Heartburn    Simvastatin Other (See Comments) and Myalgia     Joint pain    Tetracycline     Penicillins Rash      NPO Status ADULT DIET;  Regular   Isolation No active isolations     LABS   Basic Metabolic Profile No results for input(s): NA, POTASSIUM, CL, CO2, BUN, CREATININE, GLUCOSE, CA, ALB, PHOS, MG in the last 72 hours. Complete Blood Count No results for input(s): WBC, RBC, HEMOGLOBIN in the last 72 hours. Invalid input(s): HEMATOCRIT   Coagulation Studies No results for input(s): PTT, INR in the last 72 hours. Invalid input(s): PLATELETS, PROA, PT, PTTA     MEDICATIONS   Inpatient Medications     GI cocktail, , Oral, Once    mupirocin, , Nasal, 4x Daily    sucralfate, 1 g, Oral, 2 times per day    naloxegol, 25 mg, Oral, QAM AC    [COMPLETED] methocarbamol IVPB, 1,000 mg, IntraVENous, Q8H **FOLLOWED BY** methocarbamol, 1,000 mg, Oral, Q6H    tamsulosin, 0.4 mg, Oral, Daily    pantoprazole, 40 mg, Oral, BID AC    lidocaine, 2 patch, TransDERmal, Daily    rosuvastatin, 10 mg, Oral, Daily    losartan-hydroCHLOROthiazide, 1 tablet, Oral, Daily    ezetimibe, 10 mg, Oral, Daily    citalopram, 40 mg, Oral, Daily    metoprolol succinate, 100 mg, Oral, Daily    sodium chloride flush, 5-40 mL, IntraVENous, 2 times per day    enoxaparin, 40 mg, SubCUTAneous, Daily    sennosides-docusate sodium, 1 tablet, Oral, BID    Insulin Pump - Bolus Dose, , SubCUTAneous, 4x Daily AC & HS    Insulin Pump - Basal Dose, , SubCUTAneous, Daily    polyethylene glycol, 17 g, Oral, Daily   Infusions    sodium chloride Stopped (11/16/22 0306)    dextrose        Antibiotics   Recent Abx Admin        No antibiotic orders with administrations found. Neurologic Exam:  Mental status: awake and alert and oriented x4      Musculoskeletal:   Gait: Not tested   Tone: normal  Sensory: intact to all extremities  Motor strength:    Right  Left    Right  Left    Deltoid  5 5  Hip Flex  5 5   Biceps  5 5  Knee Extensors  5 5   Triceps  5 5  Knee Flexors  5 5   Wrist Ext  5 5  Ankle Dorsiflex. 5 5   Wrist Flex  5 5  Ankle Plantarflex.   5 5   Handgrip  5 5  Ext Kishan Longus  5 5   Thumb Ext  5 5         Incision: intact, clean and dry      Respiratory:  Unlabored respiratory pattern    Abdomen:   Soft, ND       Cardiovascular:  Warm, well perfused    Assessment   Patient is a 60 yo M s/p Procedure(s) (LRB):  T5-T9 DECOMPRESSION WITH RESECTION OF EPIDURAL LIPOMATOSIS WITH L2-L5 DECOMPRESSION WITH RESECTION OF EPIDURAL LIPOMATOSIS (N/A) per Dr. Jose Rafael Mayo . P2P was done and they have approved ARU. Plan:  Con't pain control  Ambulate, PT/OT  Awaiting dispo to ARU  Dispo Planning:ARU today    Patient was discussed with Dr. Jose Rafael Mayo who agrees with above assessment and plan. Electronically signed by: AURELIO Ha CNP, 11/21/2022 12:09 PM   Neurosurgery Nurse Practitioner  [phone]   I spent 45 minutes in the care of this patient.   Over 50% of that time was in face-to-face counseling regarding post op progression, pain management strategies, and answering patient and family questions

## 2022-11-21 NOTE — PROGRESS NOTES
Patient arrived to unit, he was offered to order his supper tray and refused stating he only wants coffee for supper.

## 2022-11-21 NOTE — PROGRESS NOTES
Pt discharged to 2262 without complication. IV in place per [facility] request. All belongings with patient. No further needs at time of discharge.

## 2022-11-21 NOTE — DISCHARGE SUMMARY
Discharge Summary    Date of Admission: 11/15/2022  6:59 AM  Date of Discharge: 11/21/22  Admission Diagnosis: Thoracic myelopathy [M47.14] Thoracic stenosis [M48.04] Spinal stenosis of lumbar region, unspecified whether neurogenic claudication present [M48.061]  Discharge Diagnosis: Same   Condition on Discharge: fair  Attending for Admission: Manav Cortez MD  Procedures: Procedure(s) (LRB):  T5-T9 DECOMPRESSION WITH RESECTION OF EPIDURAL LIPOMATOSIS WITH L2-L5 DECOMPRESSION WITH RESECTION OF EPIDURAL LIPOMATOSIS (N/A)  Consults: IP CONSULT TO HOSPITALIST  IP CONSULT TO DIABETES EDUCATOR  IP CONSULT TO HOME CARE NEEDS  IP CONSULT TO SOCIAL WORK  IP CONSULT TO PHYSICAL MEDICINE REHAB  IP CONSULT TO SOCIAL WORK    Reason for Admission:  Cheyanne Douglas is a 61 y.o. male patient who was admitted to the hospital for complaints of back and leg pain. He underwent the procedure listed above on 11/15/22. Hospital Course:  After surgery, His pre-operative back and leg pain was improved. He complained of incisional pain. The pain was well-controlled on oral medications. He had some issues with urinary retention and had to have flomax started. He has been voiding but needs close monitoring. His brace was in place. The incision was clean, dry and intact. There was no erythema or edema around the surgical site. Prior to discharge He was eating well, urinating and ambulating with a steady gait.     Discharge Vitals/Labs:  BP (!) 122/56 Comment: Nurse notified  Pulse 70   Temp 99 °F (37.2 °C) (Oral)   Resp 16   Ht 5' 8.5\" (1.74 m)   Wt 217 lb (98.4 kg)   SpO2 95%   BMI 32.51 kg/m²   CBC:   Lab Results   Component Value Date/Time    WBC 7.2 11/16/2022 06:36 AM    RBC 3.93 11/16/2022 06:36 AM    HGB 11.6 11/19/2022 06:27 AM    HCT 32.7 11/19/2022 06:27 AM    MCV 93.9 11/16/2022 06:36 AM    MCH 33.0 11/16/2022 06:36 AM    MCHC 35.1 11/16/2022 06:36 AM    RDW 13.0 11/16/2022 06:36 AM     11/16/2022 06:36 AM MPV 7.9 11/16/2022 06:36 AM     CMP:    Lab Results   Component Value Date/Time     11/16/2022 09:18 PM    K 3.9 11/16/2022 09:18 PM    CL 93 11/16/2022 09:18 PM    CO2 28 11/16/2022 09:18 PM    BUN 9 11/16/2022 09:18 PM    CREATININE 0.7 11/16/2022 09:18 PM    LABGLOM >60 11/16/2022 09:18 PM    GLUCOSE 127 11/16/2022 09:18 PM    CALCIUM 8.5 11/16/2022 09:18 PM       Discharge Medications: The patient suffers from a major neurological surgery that pain cannot be managed within an average of 30 MED per day. Severe acute postoperative pain is the reason for exceeding the 30 MED average, and the prescription reflects the same dosage patient received while inpatient, which is the lowest dose consistent with the patients medical condition. Non-opioid treatment options have been considered prior to prescribing opioids, and the patient has been advised of the benefits and risks of the opioid (including the potential for addiction). Medication List        CONTINUE taking these medications      bisoprolol 10 MG tablet  Commonly known as: ZEBETA     citalopram 40 MG tablet  Commonly known as: CELEXA     ezetimibe 10 MG tablet  Commonly known as: ZETIA     losartan-hydroCHLOROthiazide 100-25 MG per tablet  Commonly known as: HYZAAR     omeprazole 20 MG delayed release capsule  Commonly known as: PRILOSEC     rosuvastatin 10 MG tablet  Commonly known as: CRESTOR            STOP taking these medications      ascorbic acid 500 MG tablet  Commonly known as: VITAMIN C     aspirin 81 MG chewable tablet     Co Q-10 418 MG Caps     Garlic 5211 MG Caps     insulin lispro 100 UNIT/ML injection vial  Commonly known as: HUMALOG     therapeutic multivitamin-minerals tablet     vitamin D3 125 MCG (5000 UT) Tabs tablet  Commonly known as: CHOLECALCIFEROL     vitamin E 100 units capsule              Discharge Destination:  The patient was discharged to Rehab.      Follow-up:  The patient is to follow-up with Evelyn Walton MD in the office in 2 weeks      Discharge Instructions:   Verbal and written discharge instructions were given to the patient at the time of discharge. Electronically signed by:  AURELIO West CNP, APRN-CNP, 11/21/2022 12:27 PM  [phone]

## 2022-11-21 NOTE — PROGRESS NOTES
Physical Therapy  [facility]  Physical Therapy Daily Treatment Note    Name: Bony Hoyt  : 1955  MRN: [id number]  Date of Service: 2022    Discharge Recommendations:  Bony Hoyt scored a 16/24 on the AM-PAC short mobility form. Current research shows that an AM-PAC score of 17 or less is typically not associated with a discharge to the patient's home setting. Based on the patient's AM-PAC score and their current functional mobility deficits, it is recommended that the patient have 5-7 sessions per week of Physical Therapy at d/c to increase the patient's independence. At this time, this patient demonstrates complex nursing, medical, and rehabilitative needs, and would benefit from intensive rehabilitation services upon discharge from the Inpatient setting. This patient demonstrates the ability to participate in and benefit from an intensive therapy program with a coordinated interdisciplinary team approach to foster frequent, structured, and documented communication among disciplines, who will work together to establish, prioritize, and achieve treatment goals. Please see assessment section for further patient specific details. If patient discharges prior to next session this note will serve as a discharge summary. Please see below for the latest assessment towards goals. IP Rehab   PT Equipment Recommendations  Equipment Needed:  (RW)  Francisca Sterna: Rolling      Patient Diagnosis(es): Diagnoses of Thoracic myelopathy, Thoracic stenosis, and Spinal stenosis of lumbar region, unspecified whether neurogenic claudication present were pertinent to this visit. Past Medical History:  has a past medical history of Cerebral artery occlusion with cerebral infarction ([address] Diabetes mellitus ([address] Hyperlipidemia, Hypertension, and TIA (transient ischemic attack).   Past Surgical History:  has a past surgical history that includes Cardiac surgery; Hand surgery; cervical fusion; shoulder surgery (Bilateral); Elbow surgery (Bilateral); back surgery; and Lumbar spine surgery (N/A, 11/15/2022). Assessment   Body Structures, Functions, Activity Limitations Requiring Skilled Therapeutic Intervention: Decreased functional mobility ; Decreased strength;Decreased balance; Increased pain  Assessment: Pt demonstrates increased frustration throughout session but notes he is willing to complete anything PT wants him to do. Pt is able to ambulate 300' with frequent VC for upright posture when standing at sink and ambulating with RW. Pt able to complete bed mobility with SBA and increased time due to spinal precautions and pain. Pt would continue to benefit from skilled PT services throughout inpatient stay. Continue to recommend ARU upon discharge for additional therapy. Barriers to Learning: none  Activity Tolerance  Activity Tolerance: Patient limited by pain; Patient limited by endurance  Activity Tolerance Comments: Pt aggitated throughout session but agreeable to continue. Plan   Physcial Therapy Plan  General Plan: 5-7 times per week  Current Treatment Recommendations: Functional mobility training, Transfer training, Balance training, Gait training, Stair training, Patient/Caregiver education & training, Therapeutic activities, Equipment evaluation, education, & procurement, Strengthening, Home exercise program, Safety education & training, Endurance training  Safety Devices  Type of Devices: Bed alarm in place, Call light within reach, Gait belt, Left in bed, Nurse notified     Restrictions  Restrictions/Precautions  Restrictions/Precautions: Fall Risk (high fall risk)  Position Activity Restriction  Spinal Precautions: No Bending, No Twisting, No Lifting  Other position/activity restrictions: activity as tolerated, ambulate patient, adult diet - regular, insulin pump     Subjective   General  Chart Reviewed:  Yes  Additional Pertinent Hx: Admit 11/15 for T5-T9 DECOMPRESSION WITH RESECTION OF EPIDURAL LIPOMATOSIS WITH L2-L5 DECOMPRESSION WITH RESECTION OF EPIDURAL LIPOMATOSIS; PMHx:  Family / Caregiver Present: No  Follows Commands: Within Functional Limits  General Comment  Comments: Patient up in bathroom with RN upon arrival, agreeable to PT session. Subjective  Subjective: Patient c/o 8/10 pain with transitional movements throughout back. Pt also notes upper abdominal/ lower chest pain. RN notified         Social/Functional History  Social/Functional History  Lives With: Spouse  Type of Home: House  Home Layout: Able to Live on Main level with bedroom/bathroom  Bathroom Shower/Tub: Walk-in shower  Bathroom Toilet: Standard  Bathroom Equipment:  (none)  Home Equipment:  (none)  ADL Assistance: Needs assistance (Wife helps put socks and shoes, pants on)  Homemaking Assistance: Needs assistance  Ambulation Assistance: Independent  Transfer Assistance: Needs assistance (pt reports wife provides assist with getting LEs into bed at home; otherwise, pt indep)  Active : Yes  Occupation: Part time employment  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  Additional Comments: wife works as RN on the weekends  Vision/Hearing       Cognition   Orientation  Overall Orientation Status: Within Functional Limits  Orientation Level: Oriented X4  Cognition  Following Commands:  Follows one step commands with increased time  Attention Span: Attends with cues to redirect  Memory:  (unable to recall spinal precautions)  Safety Judgement: Decreased awareness of need for safety  Insights: Decreased awareness of deficits  Initiation: Requires cues for some  Sequencing: Requires cues for some     Objective     Bed mobility  Sit to Supine: Modified independent (with log roll technique and use of rail, increased time to perform)  Scooting: Modified independent (seated at edge of bed)  Transfers  Sit to Stand: Supervision (to RW - cues for hand placement and sequencing)  Stand to Sit: Supervision (from RW - cues for hand placement and sequencing)  Stand Pivot Transfers:  (with RW)  Comment: cues for safe hand placement with transfers, toilet transfer with supervision  Ambulation  Surface: Level tile  Device: Rolling Walker  Assistance: Stand by assistance  Quality of Gait: slow cate, steady gait - no loss of balance, forward flexed posture- Max VC for upright posture, decreased (B) step length and foot clearance  Distance: 300 ft  Stairs/Curb  Stairs?: No     Balance  Posture: Fair (forward flexed, rounded shoulders)  Sitting - Static: Good  Sitting - Dynamic: Good;-  Standing - Static: Fair;+  Standing - Dynamic: Fair  Comments: (S) to SBA with RW for standing balance during mobility    AM-PAC Score  AM-PAC Inpatient Mobility Raw Score : 16 (11/21/22 1252)  AM-PAC Inpatient T-Scale Score : 40.78 (11/21/22 1252)  Mobility Inpatient CMS 0-100% Score: 54.16 (11/21/22 1252)  Mobility Inpatient CMS G-Code Modifier : CK (11/21/22 1252)    Goals  Short Term Goals  Time Frame for Short Term Goals: discharge - all goals ongoing 11/21  Short Term Goal 1: Pt will transfer supine <--> sit with CGA via log roll , met 11/17 - new STG - supine to sit with Supervision  Short Term Goal 2: Pt will transfer sit <--> stand with supervision - goal met 11/20; updated: Pt. will demonstrate sit <-> stand modified (I) with LRAD  Short Term Goal 3: Pt will ambulate 150 ft with LRAD and supervision  Short Term Goal 4: Pt will negotiate 4 steps with CGA - goal met 11/20; updated: Pt. will negotiate >/= 4 stairs with no rails and (S) with LRAD to enter/exit home  Patient Goals   Patient Goals : \"to go home, walk without the walker, get back to playing "[employer]       Education  Patient Education  Education Given To: Patient  Education Provided: Role of Therapy;Plan of Care;Precautions  Education Provided Comments: use of call light, PT recommendations, spinal precautions  Education Method: Demonstration;Verbal  Barriers to Learning: None  Education Outcome: Verbalized understanding;Continued education needed      Therapy Time   Individual Concurrent Group Co-treatment   Time In 1214         Time Out 6724         Minutes 23         Timed Code Treatment Minutes: [address] [facility], [address] [facility] [place], [place] [id number]

## 2022-11-21 NOTE — PROGRESS NOTES
Hospital Medicine  Consult progress note      Chief Complaint: Neck pain, back pain    Date of Service: Pt seen/examined in consultation on 11/15/21    History Of Present Illness:      61 y.o. male who we are asked to see/evaluate by Silva Delacruz MD for medical management of diabetes mellitus type 2, hypertension     Patient has a medical history of insulin-dependent DM , hypertension, hyperlipidemia, TIAs. .. He has been having chronic thoracic and lumbar pain with lower extremity weakness and an MRI demonstrated epidural lipomatosis severe spinal stenosis with cord compression from the T5-6 to T9 levels and severe central stenosis at L3-4 and L4-5 with moderate to severe stenosis at L2-3 this is secondary to a combination of epidural lipomatosis and facet arthropathy. Patient failed outpatient conservative treatment and underwent an elective T5-T9--L2-5,laminectomy, medial facetectomy for decompression and foraminotomies,Resection of Epidural Lipoma/lipomatosis  11/15/22. Hospitalist service was consulted for postop medical management    Interval history:  Reports burning substernal epigastric pain. .        Past Medical History:        Diagnosis Date    Cerebral artery occlusion with cerebral infarction ([address]     TIA's x 4    Diabetes mellitus ([address]     Hyperlipidemia     Hypertension     TIA (transient ischemic attack)        Past Surgical History:        Procedure Laterality Date    BACK SURGERY      LUMBAR X2    CARDIAC SURGERY      OPEN HEART 2006, SEES DR RASHID NOW    CERVICAL FUSION      C 6-7    ELBOW SURGERY Bilateral     TENDON REPAIR    HAND SURGERY      x9    LUMBAR SPINE SURGERY N/A 11/15/2022    T5-T9 DECOMPRESSION WITH RESECTION OF EPIDURAL LIPOMATOSIS WITH L2-L5 DECOMPRESSION WITH RESECTION OF EPIDURAL LIPOMATOSIS performed by Silva Delacruz MD at [address] Bilateral     ROTATOR CUFF       Medications Prior to Admission:    Prior to Admission medications Medication Sig Start Date End Date Taking? Authorizing Provider   rosuvastatin (CRESTOR) 10 MG tablet Take 10 mg by mouth daily   Yes Historical Provider, MD   ezetimibe (ZETIA) 10 MG tablet Take 10 mg by mouth daily   Yes Historical Provider, MD   omeprazole (PRILOSEC) 20 MG delayed release capsule Take 20 mg by mouth daily   Yes Historical Provider, MD   losartan-hydroCHLOROthiazide (HYZAAR) 100-25 MG per tablet Take 1 tablet by mouth daily   Yes Historical Provider, MD   bisoprolol (ZEBETA) 10 MG tablet Take 10 mg by mouth at bedtime   Yes Historical Provider, MD   citalopram (CELEXA) 40 MG tablet Take 40 mg by mouth daily   Yes Historical Provider, MD       Allergies:  Acetaminophen, Atorvastatin, Ciprofloxacin, Cortisone, Gabapentin, Hydrocodone, Liraglutide, Simvastatin, Tetracycline, and Penicillins    Social History:      The patient currently lives     TOBACCO:   reports that he has never smoked. He has never used smokeless tobacco.  ETOH:   has no history on file for alcohol use. Family History:      Reviewed in detail and negative for DM, CAD, Cancer, CVA. Positive as follows:    History reviewed. No pertinent family history. REVIEW OF SYSTEMS COMPLETED:   Pertinent positives as noted in the HPI. All other systems reviewed and negative. PHYSICAL EXAM PERFORMED:  BP (!) 122/56 Comment: Nurse notified  Pulse 70   Temp 99 °F (37.2 °C) (Oral)   Resp 16   Ht 5' 8.5\" (1.74 m)   Wt 217 lb (98.4 kg)   SpO2 95%   BMI 32.51 kg/m²   General appearance: No apparent distress, appears stated age and cooperative. HEENT: Normal cephalic, atraumatic without obvious deformity. Pupils equal, round, and reactive to light. Extra ocular muscles intact. Conjunctivae/corneas clear. Neck: Supple, with full range of motion. No jugular venous distention. Trachea midline. Respiratory:  Normal respiratory effort. Clear to auscultation, bilaterally without Rales/Wheezes/Rhonchi.   Cardiovascular: Regular rate and rhythm with normal S1/S2 without murmurs, rubs or gallops. Abdomen: Soft, tender epigastrium to deep palpation. , non-distended . Musculoskeletal:  No clubbing, cyanosis or edema bilaterally. Skin: Skin color, texture, turgor normal.  No rashes or lesions. Neurologic:  Neurovascularly intact without any focal sensory/motor deficits. Cranial nerves: II-XII intact, grossly non-focal.  Psychiatric: Alert and oriented, thought content appropriate, normal insight  Capillary Refill: Brisk,3 seconds, normal     Labs:     Recent Labs     11/19/22  0627   HGB 11.6*   HCT 32.7*       No results for input(s): NA, K, CL, CO2, BUN, CREATININE, CALCIUM, PHOS in the last 72 hours. Invalid input(s): MAGNES    No results for input(s): AST, ALT, BILIDIR, BILITOT, ALKPHOS in the last 72 hours. No results for input(s): INR in the last 72 hours. No results for input(s): Rosy Smack in the last 72 hours. Urinalysis:  No results found for: Glorietta March, BACTERIA, RBCUA, BLOODU, [address], [address]    Radiology: I have reviewed the radiology reports with the following interpretation:     CT ABDOMEN PELVIS W IV CONTRAST Additional Contrast? Oral   Final Result      1. Mild wall thickening in the antrum and pylorus of the stomach. Correlate for gastritis. 2.  Recent postsurgical changes post lumbar laminectomies. 3.  5 mm right lower lobe nodule. Following the Fleischner Society 2017 guidelines, if low risk for lung cancer no routine follow-up is necessary. If  high risk, then optional chest CT in 12 months.  qzxv      US RENAL COMPLETE   Final Result      Normal renal ultrasound                        XR THORACIC SPINE 1 VW   Final Result   1. Intraoperative fluoroscopy during spine surgery as described. Images demonstrate localization of posterior thoracic and lumbar vertebral elements. XR LUMBAR SPINE 1 VW   Final Result   1. Intraoperative fluoroscopy during spine surgery as described.  Images demonstrate localization of posterior thoracic and lumbar vertebral elements. FLUORO FOR SURGICAL PROCEDURES   Final Result   1. Intraoperative fluoroscopy during spine surgery as described. Images demonstrate localization of posterior thoracic and lumbar vertebral elements. ASSESSMENT:    -Severe central stenosis at T5-T9 with Epidural Lipomatosis/lipoma  -Severe central stenosis at L2-3-4-5 with Epidural Lipomatosis/lipoma    S/p  T5-T9--L2-5,   laminectomy, medial facetectomy for decompression and foraminotomies,Resection of Epidural Lipoma/lipomatosis  11/15/22     Continue postop care per neurosurgery-pain management, PT and OT      Acute onset lower chest and epigastric abdominal pain at, radiates to the back: Pain and burning--suspect due to GERD with gastritis  CT 11/17 showed mild wall thickening of the antrum pylorus  -amylase and lipase wnl. EKG and troponins negative for ischemia  Continue PPI. .  Ordered GI cocktail. .. Recommend outpatient GI follow-up if symptoms persist..      Insulin-dependent diabetes mellitus type 2-on insulin pump-continue with insulin pump    Essential hypertension-e  Continue metoprolol, losartan/HCTZ    Hyperlipidemia--continue rosuvastatin, ezetemibe      GERD-continue PPI    History of TIAs--continue statin. .  ASA on hold      DVT Prophylaxis: scds  Diet: ADULT DIET;  Regular  Code Status: Full Code    Stable for discharge to ARU from medical standpoint      Electronically signed by Mikki Dennis MD on 11/21/2022 at 12:26 PM

## 2022-11-21 NOTE — PROGRESS NOTES
Progress Note  Physical Medicine and Rehabilitation    Patient: Dai Dominguez  [id number]  Date: 11/21/2022      Chief Complaint: Back pain    History of Present Illness/Hospital Course: Dai Dominguez is a 61 y.o. male, past medical history of type 2 diabetes, hypertension, hyperlipidemia who has been having chronic thoracic and lumbar pain with lower extremity weakness. He had an MRI done which demonstrated epidural lipomatosis severe spinal stenosis and cord compression from the T5 T6-T9 levels has severe central stenosis at L3-L4 and L4-L5 with moderate to severe stenosis at L2-L3. This likely secondary to a combination of epidural lipomatosis and facet arthropathy. Patient has been seen neurosurgery and had failed outpatient conservative management. He underwent T5-T9 decompression with resection of epidural lipomatosis with L2-L5 decompression with resection of epidural lipomatosis on 11/15. Improved in therapy yesterday     Prior Level of Function:  Independent for mobility, ADLs, and IADLs    Current Level of Function:  Mod assist    Pertinent Social History:  Support: Lives at home with wife   Home set-up: 2 story with a basement. Would need to use 13 stairs. Past Medical History:   Diagnosis Date    Cerebral artery occlusion with cerebral infarction ([address]     TIA's x 4    Diabetes mellitus ([address]     Hyperlipidemia     Hypertension     TIA (transient ischemic attack)        Past Surgical History:   Procedure Laterality Date    BACK SURGERY      LUMBAR X2    CARDIAC SURGERY      OPEN HEART 2006, SEES DR RASHID NOW    CERVICAL FUSION      C 6-7    ELBOW SURGERY Bilateral     TENDON REPAIR    HAND SURGERY      x9    LUMBAR SPINE SURGERY N/A 11/15/2022    T5-T9 DECOMPRESSION WITH RESECTION OF EPIDURAL LIPOMATOSIS WITH L2-L5 DECOMPRESSION WITH RESECTION OF EPIDURAL LIPOMATOSIS performed by Augusta Lewis MD at [address] [place] [address] Bilateral     ROTATOR CUFF       History reviewed.  No pertinent family history. Social History     Socioeconomic History    Marital status:      Spouse name: None    Number of children: None    Years of education: None    Highest education level: None   Tobacco Use    Smoking status: Never    Smokeless tobacco: Never   Vaping Use    Vaping Use: Never used   Substance and Sexual Activity    Drug use: Never           REVIEW OF SYSTEMS:   CONSTITUTIONAL: negative for fevers, chills, diaphoresis, appetite change, night sweats, unexpected weight change, fatigue  EYES: negative for blurred vision, eye discharge, visual disturbance and icterus  HEENT: negative for hearing loss, tinnitus, ear drainage, sinus pressure, nasal congestion, epistaxis and snoring  RESPIRATORY: Negative for hemoptysis, cough, sputum production  CARDIOVASCULAR: negative for chest pain, palpitations, exertional chest pressure/discomfort, syncope, edema  GASTROINTESTINAL: negative for nausea, vomiting, diarrhea, blood in stool, abdominal pain, constipation  GENITOURINARY: negative for frequency, dysuria, urinary incontinence, decreased urine volume, and hematuria  HEMATOLOGIC/LYMPHATIC: negative for easy bruising, bleeding and lymphadenopathy  ALLERGIC/IMMUNOLOGIC: negative for recurrent infections, angioedema, anaphylaxis and drug reactions  ENDOCRINE: negative for weight changes and diabetic symptoms including polyuria, polydipsia and polyphagia  MUSCULOSKELETAL: negative for pain, joint swelling, decreased range of motion  NEUROLOGICAL: negative for headaches, slurred speech, unilateral weakness  PSYCHIATRIC/BEHAVIORAL: negative for hallucinations, behavioral problems, confusion and agitation.      Physical Examination:  Vitals: Patient Vitals for the past 24 hrs:   BP Temp Temp src Pulse Resp SpO2   11/21/22 0928 (!) 107/53 -- -- 74 -- --   11/21/22 0645 (!) 105/54 98.4 °F (36.9 °C) Oral 64 16 --   11/21/22 0327 -- -- -- -- -- 98 %   11/21/22 0326 (!) 107/56 98.6 °F (37 °C) Oral 67 16 -- 11/20/22 2300 112/70 98.4 °F (36.9 °C) Oral 74 16 98 %   11/20/22 1826 (!) 100/54 97.9 °F (36.6 °C) Oral 71 16 97 %   11/20/22 1442 110/63 98 °F (36.7 °C) Oral 65 16 95 %   11/20/22 1058 124/66 98.3 °F (36.8 °C) Axillary 68 16 96 %       Const: Alert. WDWN. No distress  Eyes: Conjunctiva noninjected, no icterus noted; pupils equal, round, and reactive to light. HENT: Atraumatic, normocephalic; Oral mucosa moist  Neck: Trachea midline, neck supple. No thyromegaly noted. CV: Regular rate and rhythm, no murmur rub or gallop noted  Resp: Lungs clear to auscultation bilaterally, no rales wheezes or ronchi, no retractions. Respirations unlabored. GI: Soft, nontender, nondistended. Normal bowel sounds. No palpable masses. Skin: Normal temperature and turgor. No rashes or breakdown noted. Ext: No significant edema appreciated. No varicosities. MSK: Midline back scar extending from thoracic to lumbar region with no signs of infection. Neuro: Alert, oriented, appropriate. No cranial nerve deficits appreciated. Sensation intact to light touch. Motor examination reveals normal strength in all four limbs diffusely. No abnormalities with finger/nose or heel/shin noted. Reflexes normal and symmetric. Psych: Stable mood, normal judgement, normal affect     Lab Results   Component Value Date    WBC 7.2 11/16/2022    HGB 11.6 (L) 11/19/2022    HCT 32.7 (L) 11/19/2022    MCV 93.9 11/16/2022     11/16/2022     Lab Results   Component Value Date    INR 1.07 11/15/2022    PROTIME 13.8 11/15/2022     Lab Results   Component Value Date    CREATININE 0.7 (L) 11/16/2022    BUN 9 11/16/2022     (L) 11/16/2022    K 3.9 11/16/2022    CL 93 (L) 11/16/2022    CO2 28 11/16/2022     No results found for: ALT, AST, GGT, ALKPHOS, BILITOT     Most recent EKG revealed:  11/15  Normal sinus rhythm 92 bpm       CT ABDOMEN PELVIS W IV CONTRAST Additional Contrast? Oral   Final Result      1.   Mild wall thickening in the antrum and pylorus of the stomach. Correlate for gastritis. 2.  Recent postsurgical changes post lumbar laminectomies. 3.  5 mm right lower lobe nodule. Following the Fleischner Society 2017 guidelines, if low risk for lung cancer no routine follow-up is necessary. If  high risk, then optional chest CT in 12 months.  qzxv      US RENAL COMPLETE   Final Result      Normal renal ultrasound                        XR THORACIC SPINE 1 VW   Final Result   1. Intraoperative fluoroscopy during spine surgery as described. Images demonstrate localization of posterior thoracic and lumbar vertebral elements. XR LUMBAR SPINE 1 VW   Final Result   1. Intraoperative fluoroscopy during spine surgery as described. Images demonstrate localization of posterior thoracic and lumbar vertebral elements. FLUORO FOR SURGICAL PROCEDURES   Final Result   1. Intraoperative fluoroscopy during spine surgery as described. Images demonstrate localization of posterior thoracic and lumbar vertebral elements. Assessment:  Severe central stenosis at T5-T9 with Epidural Lipomatosis/lipoma  Severe central stenosis at L2-3-4-5 with Epidural Lipomatosis/lipoma  Status post T5-T9--L2-5, laminectomy, medial facetectomy for decompression and foraminotomies,Resection of Epidural Lipoma/lipomatosis  11/15/22    -Neurosurgery following   -Continue PT/OT   -Pain management: Oxycodone and lidocaine patch   -Robaxin scheduled and Valium as needed for muscle spasms   -Postop incision care     Type 2 diabetes  -Continue with insulin pump  -Management per primary team     Essential hypertension   -Continue metoprolol, losartan/HCTZ     Hyperlipidemia  -continue rosuvastatin, ezetemibe     GERD  -continue PPI     History of TIAs  -continue statin. Camille Valiente -ASA on hold    Impairments- Decreased functional mobility, Decreased ADLs    Recommendations:  Admit to ARU today.        Colt Neal D.O. M.P.H  PM&R  11/21/2022  9:39 AM

## 2022-11-21 NOTE — CARE COORDINATION
Call received from ARU liaison; Neuro NP completed P2P and it was overturned. ARU to take patient today. Rapid Covid ordered per protocol and charge RN made aware.      Electronically signed by Taniya Dumont RN on 11/21/2022 at 12:29 PM

## 2022-11-21 NOTE — PROGRESS NOTES
Pt A&Ox4, VSS on room air. Pt complaining of intermittent chest pain, MD notified and GI cocktail ordered. Pt up as tolerated x1 GB walker. Skin CDI with exception to incision on back. Dressing CDI. Standard safety measures in place, call light within reach. Denies further needs. Plan of care continues.

## 2022-11-21 NOTE — DISCHARGE INSTR - COC
Continuity of Care Form    Patient Name: Gama Gutierrez   :  1955  MRN:  [id number]    Admit date:  11/15/2022  Discharge date:  ***    Code Status Order: Full Code   Advance Directives:   [facility] Documentation       Date/Time Healthcare Directive Type of Healthcare Directive Copy in [facility] [address] Agent's Name Healthcare Agent's Phone Number    11/15/22 4418 No, patient does not have an advance directive for healthcare treatment -- -- -- -- --            Admitting Physician:  Devaughn Allison MD  PCP: Gem Iqbal    Discharging Nurse: [facility] Unit/Room#: 1494/7795-62  Discharging Unit Phone Number: ***    Emergency Contact:   Extended Emergency Contact Information  Primary Emergency Contact: Milady Napoles  Phone: [phone]  Relation: Spouse  Secondary Emergency Contact: Nakul Pop Phone: [phone]  Relation: Child    Past Surgical History:  Past Surgical History:   Procedure Laterality Date    [facility] 2006, SEES  [address]      C 6-7    ELBOW SURGERY Bilateral     TENDON REPAIR    HAND SURGERY      x9    LUMBAR SPINE SURGERY N/A 11/15/2022    T5-T9 DECOMPRESSION WITH RESECTION OF EPIDURAL LIPOMATOSIS WITH L2-L5 DECOMPRESSION WITH RESECTION OF EPIDURAL LIPOMATOSIS performed by Devaughn Allison MD at [facility] [place] Bilateral     ROTATOR CUFF       Immunization History:   Immunization History   Administered Date(s) Administered    COVID-19, PFIZER Bivalent BOOSTER, (age 12y+), IM, 30 mcg/0.3 mL dose 10/22/2022    COVID-19, PFIZER GRAY top, DO NOT Dilute, (age 15 y+), IM, 30 mcg/0.3 mL 2022    COVID-19, PFIZER PURPLE top, DILUTE for use, (age 15 y+), 30mcg/0.3mL 2021, 2021, 10/13/2021       Active Problems:  Patient Active Problem List   Diagnosis Code    Lumbar stenosis with neurogenic claudication M48.062    Status post lumbar spine operative procedure for decompression of spinal cord Z98.890       Isolation/Infection:   Isolation            No Isolation          Patient Infection Status       None to display            Nurse Assessment:  Last Vital Signs: BP (!) 122/56 Comment: Nurse notified  Pulse 70   Temp 99 °F (37.2 °C) (Oral)   Resp 16   Ht 5' 8.5\" (1.74 m)   Wt 217 lb (98.4 kg)   SpO2 95%   BMI 32.51 kg/m²     Last documented pain score (0-10 scale): Pain Level: 8  Last Weight:   Wt Readings from Last 1 Encounters:   11/15/22 217 lb (98.4 kg)     Mental Status:  oriented and alert    IV Access:  - Peripheral IV - site  R Antecubital, insertion date:      Nursing Mobility/ADLs:  Walking   Assisted  Transfer  Assisted  Bathing  Assisted  Dressing  Assisted  Toileting  Assisted  Feeding  Independent  Med Admin  Assisted  Med Delivery   whole    Wound Care Documentation and Therapy:  Incision 11/15/22 Back Medial;Mid (Active)   Dressing Status Clean;Dry; Intact 11/21/22 1130   Incision Cleansed Cleansed with saline 11/20/22 0834   Dressing/Treatment Open to air 11/21/22 1130   Closure Surgical glue 11/21/22 1130   Margins Approximated 11/21/22 1130   Drainage Amount None 11/21/22 1130   Drainage Description Serosanguinous 11/19/22 0750   Odor None 11/21/22 1130   Saloni-incision Assessment Intact 11/21/22 1130   Number of days: 6       Incision 11/15/22 Back Lower;Medial (Active)   Dressing Status Clean;Dry; Intact 11/21/22 1130   Incision Cleansed Cleansed with saline 11/20/22 0834   Dressing/Treatment Open to air 11/21/22 1130   Closure Surgical glue 11/21/22 1130   Margins Approximated 11/21/22 1130   Drainage Amount None 11/21/22 1130   Drainage Description Serosanguinous 11/19/22 0750   Odor None 11/21/22 1130   Saloni-incision Assessment Intact 11/20/22 1531   Number of days: 6        Elimination:  Continence:    Bowel: Yes  Bladder: Yes  Urinary Catheter: None   Colostomy/Ileostomy/Ileal Conduit: No       Date of Last BM:     Intake/Output Summary (Last 24 hours) at 11/21/2022 1220  Last data filed at 11/21/2022 0814  Gross per 24 hour   Intake 690 ml   Output --   Net 690 ml     I/O last 3 completed shifts: In: 56 [P.O.:480; I.V.:10]  Out: -     Safety Concerns: At Risk for Falls    Impairments/Disabilities:      None    Nutrition Therapy:  Current Nutrition Therapy:   - Oral Diet:  General    Routes of Feeding: Oral  Liquids: Thin Liquids  Daily Fluid Restriction: no  Last Modified Barium Swallow with Video (Video Swallowing Test): not done    Treatments at the Time of Hospital Discharge:   Respiratory Treatments:   Oxygen Therapy:  is not on home oxygen therapy. Ventilator:    - No ventilator support    Rehab Therapies: Physical Therapy and Occupational Therapy  Weight Bearing Status/Restrictions: No weight bearing restrictions  Other Medical Equipment (for information only, NOT a DME order):  walker  Other Treatments:     Patient's personal belongings (please select all that are sent with patient):   All belongings at bedside    RN SIGNATURE:  Electronically signed by Kaylene Mayen RN on 11/21/22 at 4:04 PM EST    CASE MANAGEMENT/SOCIAL WORK SECTION    Inpatient Status Date: ***    Readmission Risk Assessment Score:  Readmission Risk              Risk of Unplanned Readmission:  13           Discharging to Facility/ Agency   Name:   Address:  Phone:  Fax:    Dialysis Facility (if applicable)   Name:  Address:  Dialysis Schedule:  Phone:  Fax:    / signature: {Esignature:[id number]}    PHYSICIAN SECTION    Prognosis: Good    Condition at Discharge: Stable    Rehab Potential (if transferring to Rehab): Good    Recommended Labs or Other Treatments After Discharge: follow up with Jessica Forman in 2 weeks, may shower    Physician Certification: I certify the above information and transfer Don Nobles  is necessary for the continuing treatment of the diagnosis listed and that he requires Acute Rehab for less 30 days.      Update Admission H&P: No change in H&P    PHYSICIAN SIGNATURE:  Electronically signed by AURELIO Gr CNP on 11/21/22 at 12:23 PM EST

## 2022-11-21 NOTE — PROGRESS NOTES
Occupational Therapy  Facility/Department: [facility]  Occupational Therapy Progress Note    Name: Rachel Oro  : 1955  MRN: [id number]  Date of Service: 2022    Discharge Recommendations:  Rachel Oro scored a 20/24 on the AM-PAC ADL Inpatient form. Current research shows that an AM-PAC score of 18 or greater is typically associated with a discharge to the patient's home setting. Based on the patient's AM-PAC score, and their current ADL deficits, it is recommended that the patient have 2-3 sessions per week of Occupational Therapy at d/c to increase the patient's independence. At this time, this patient demonstrates the endurance and safety to discharge home with home OT and a follow up treatment frequency of 2-3x/wk. Please see assessment section for further patient specific details. If patient discharges prior to next session this note will serve as a discharge summary. Please see below for the latest assessment towards goals. OT Equipment Recommendations  Other: defer to next level of care       Patient Diagnosis(es): Diagnoses of Thoracic myelopathy, Thoracic stenosis, and Spinal stenosis of lumbar region, unspecified whether neurogenic claudication present were pertinent to this visit. Past Medical History:  has a past medical history of Cerebral artery occlusion with cerebral infarction ([address] Diabetes mellitus ([address] Hyperlipidemia, Hypertension, and TIA (transient ischemic attack). Past Surgical History:  has a past surgical history that includes Cardiac surgery; Hand surgery; cervical fusion; shoulder surgery (Bilateral); Elbow surgery (Bilateral); back surgery; and Lumbar spine surgery (N/A, 11/15/2022). Treatment Diagnosis: impaired ADLs and functional mobility      Assessment   Performance deficits / Impairments: Decreased functional mobility ; Decreased endurance;Decreased ADL status; Decreased balance;Decreased strength;Decreased safe awareness;Decreased high-level IADLs  Assessment: Pt's pain most limiting factor on this date. He participated in functional transfer with rolling walker and SBA, LE dressing with adaptive equipment. Pt not able to do any more tasks secondary to pain. Recommend ongoing OT at discharge to maximize functional abilities. Treatment Diagnosis: impaired ADLs and functional mobility  Prognosis: Good  REQUIRES OT FOLLOW-UP: Yes  Activity Tolerance  Activity Tolerance: Patient limited by pain        Plan   Occupational Therapy Plan  Times Per Week: 5-7  Times Per Day: Once a day  Current Treatment Recommendations: ROM, Balance training, Pain management, Self-Care / ADL, Return to work related activity, Functional mobility training, Safety education & training, Home management training, Endurance training, Neuromuscular re-education     Restrictions  Restrictions/Precautions  Restrictions/Precautions: Fall Risk (high fall risk)  Position Activity Restriction  Spinal Precautions: No Bending, No Twisting, No Lifting  Other position/activity restrictions: activity as tolerated, ambulate patient, adult diet - regular, insulin pump    Subjective   General  Chart Reviewed: Yes  Additional Pertinent Hx: 61 y.o. male with c/o chronic thoracic pain, lumbar pain  bilateral LE pain and weakness. Pt is POD #1 from T5-T9 DECOMPRESSION WITH RESECTION OF EPIDURAL LIPOMATOSIS WITH L2-L5 DECOMPRESSION WITH RESECTION OF EPIDURAL LIPOMATOSIS. PMHx of TIAs, HTN, DM  Family / Caregiver Present: No  Referring Practitioner: SASHA Clancy  Diagnosis: Lumbar stenosis with neurogenic claudication  Subjective  Subjective: Pt in bed, reporting he is having alot of pain, but agreeable to work with OT.      Social/Functional History  Social/Functional History  Lives With: Spouse  Type of Home: House  Home Layout: Able to Live on Main level with bedroom/bathroom  Bathroom Shower/Tub: Walk-in shower  Bathroom Toilet: Standard  Bathroom Equipment:  (none)  Home Equipment:  (none)  ADL Assistance: Needs assistance (Wife helps put socks and shoes, pants on)  Homemaking Assistance: Needs assistance  Ambulation Assistance: Independent  Transfer Assistance: Needs assistance (pt reports wife provides assist with getting LEs into bed at home; otherwise, pt indep)  Active : Yes  Occupation: Part time employment  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 Bed mobility  Supine to Sit: Supervision (bed flat, no railing, does log rolling without cues)  Sit to Supine: Supervision (bed flat, no railing, does log rolling without cues)  Scooting: Stand by assistance  Transfers  Sit to stand: Stand by assistance  Stand to sit: Stand by assistance  Transfer Comments: With rolling walker and SBA, pt transferred to sofa in room. Cognition  Overall Cognitive Status: Exceptions  Following Commands: Follows one step commands with increased time  Attention Span: Attends with cues to redirect  Memory:  (pt reports he has impaired memory secondary to history of CVA)  Safety Judgement: Decreased awareness of need for safety  Insights: Decreased awareness of deficits  Initiation: Requires cues for some  Sequencing: Requires cues for some  Orientation  Overall Orientation Status: Within Functional Limits  Orientation Level: Oriented X4                  Education Given To: Patient  Education Provided: Role of Therapy;Plan of Care;Precautions; ADL Adaptive Strategies;Transfer Training  Education Method: Verbal;Demonstration  Education Outcome: Verbalized understanding;Continued education needed                        G-Code     OutComes Score                                                  AM-PAC Score        AM-PAC Inpatient Daily Activity Raw Score: 20 (11/21/22 1438)  AM-PAC Inpatient ADL T-Scale Score : 42.03 (11/21/22 1438)  ADL Inpatient CMS 0-100% Score: 38.32 (11/21/22 1438)  ADL Inpatient CMS G-Code Modifier : CJ (11/21/22 1438)    Tinneti Score       Goals  Short Term Goals  Time Frame for Short Term Goals: by discharge  Short Term Goal 1: Pt will perform LB dressing with Min A and AE PRN- 11/21 goal partly met  Short Term Goal 2: Pt will perform toilet transfer with SBA- 11/21 goal not addressed  Short Term Goal 3: Pt will perform log roll techique for supine to sit with supervison and no cues- 11/21 goal met with S  Short Term Goal 4: Pt will stand 5 mins with SBA while completing grooming task- 11/21 goal not met  Short Term Goal 5: Pt to perform toilet transfer with mod I.-11/21 goal not addressed  Patient Goals   Patient goals : to go home       Therapy Time   Individual Concurrent Group Co-treatment   Time In 1325         Time Out 1350         Minutes 25          Timed Code Treatment Minutes:  25    Total Treatment Minutes:   [address], OTR/L Kay[address] 19

## 2022-11-21 NOTE — CARE COORDINATION
Case Management Assessment            Discharge Note                    Date / Time of Note: 11/21/2022 2:25 PM                  Discharge Note Completed by: Talia Obrien RN    Patient Name: Gogo Green   YOB: 1955  Diagnosis: Thoracic myelopathy [M47.14]  Thoracic stenosis [M48.04]  Spinal stenosis of lumbar region, unspecified whether neurogenic claudication present [M48.061]  Lumbar stenosis with neurogenic claudication [M48.062]   Date / Time: 11/15/2022  6:59 AM    Current PCP: Payam Durán patient: No    Hospitalization in the last 30 days: No    Advance Directives:  Code Status: Full Code  [place] DNR form completed and on chart: Not Indicated    Financial:  Payor: Hetal Cleary / Plan: Teagan Henry ESSENTIAL/PLUS / Product Type: *No Product type* /      Pharmacy:    [address] [place], [place] - [address] [phone] Phil Duque [phone] [address] [facility]  Phone: [phone] Fax: [phone]      Assistance purchasing medications?:    Assistance provided by Case Management: None at this time    Does patient want to participate in local refill/ meds to beds program?:      Meds To Beds General Rules:  1. Can ONLY be done Monday- Friday between 8:30am-5pm  2. Prescription(s) must be in pharmacy by 3pm to be filled same day  3. Copy of patient's insurance/ prescription drug card and patient face sheet must be sent along with the prescription(s)  4. Cost of Rx cannot be added to hospital bill. If financial assistance is needed, please contact unit  or ;  or  CANNOT provide pharmacy voucher for patients co-pays  5.  Patients can then  the prescription on their way out of the hospital at discharge, or pharmacy can deliver to the bedside if staff is available. (payment due at time of pick-up or delivery - cash, check, or card accepted)     Able to afford home medications/ co-pay costs: Yes    ADLS:  Current PT AM-PAC Score: 16 /24  Current OT AM-PAC Score: 20 /24      DISCHARGE Disposition: Inpatient Rehab: [facility]  Phone: . Fax: Tammie Chinedu LOC at discharge: Skilled  UMESH Completed: Yes    Notification completed in HENS/PAS?:  Not Applicable    IMM Completed:   Not Indicated    Transportation:  Transportation PLAN for discharge:  floor to floor transfer    Mode of Transport:  stretcher      Referrals made at [facility] for outpatient continued care:  Not Applicable    Additional CM Notes: Patient p2p overturned; admitting to ARU today. COVID test ordered. Patient and wife aware. The Plan for Transition of Care is related to the following treatment goals of Thoracic myelopathy [M47.14]  Thoracic stenosis [M48.04]  Spinal stenosis of lumbar region, unspecified whether neurogenic claudication present [M48.061]  Lumbar stenosis with neurogenic claudication [M48.062]    The Patient and/or patient representative Domingo Leonard and his family were provided with a choice of provider and agrees with the discharge plan Yes    Freedom of choice list was provided with basic dialogue that supports the patient's individualized plan of care/goals and shares the quality data associated with the providers.  Yes    Care Transitions patient: No    Lai Devlin RN  [facility] [employer]  Case Management Department  Ph: [phone]  Fax: [phone]

## 2022-11-22 PROCEDURE — 97530 THERAPEUTIC ACTIVITIES: CPT

## 2022-11-22 PROCEDURE — 97110 THERAPEUTIC EXERCISES: CPT

## 2022-11-22 PROCEDURE — 97166 OT EVAL MOD COMPLEX 45 MIN: CPT

## 2022-11-22 PROCEDURE — 99222 1ST HOSP IP/OBS MODERATE 55: CPT | Performed by: PHYSICAL MEDICINE & REHABILITATION

## 2022-11-22 PROCEDURE — 94150 VITAL CAPACITY TEST: CPT

## 2022-11-22 PROCEDURE — 6370000000 HC RX 637 (ALT 250 FOR IP): Performed by: PHYSICAL MEDICINE & REHABILITATION

## 2022-11-22 PROCEDURE — 97162 PT EVAL MOD COMPLEX 30 MIN: CPT

## 2022-11-22 PROCEDURE — 6360000002 HC RX W HCPCS: Performed by: PHYSICAL MEDICINE & REHABILITATION

## 2022-11-22 PROCEDURE — 97116 GAIT TRAINING THERAPY: CPT

## 2022-11-22 PROCEDURE — 1280000000 HC REHAB R&B

## 2022-11-22 PROCEDURE — 97535 SELF CARE MNGMENT TRAINING: CPT

## 2022-11-22 RX ORDER — TRAZODONE HYDROCHLORIDE 50 MG/1
50 TABLET ORAL NIGHTLY PRN
Status: DISCONTINUED | OUTPATIENT
Start: 2022-11-22 | End: 2022-11-25 | Stop reason: HOSPADM

## 2022-11-22 RX ADMIN — MUPIROCIN: 20 OINTMENT TOPICAL at 18:32

## 2022-11-22 RX ADMIN — METOPROLOL SUCCINATE 100 MG: 50 TABLET, EXTENDED RELEASE ORAL at 09:10

## 2022-11-22 RX ADMIN — OXYCODONE 10 MG: 5 TABLET ORAL at 06:48

## 2022-11-22 RX ADMIN — ENOXAPARIN SODIUM 40 MG: 100 INJECTION SUBCUTANEOUS at 09:10

## 2022-11-22 RX ADMIN — SUCRALFATE 1 G: 1 TABLET ORAL at 09:19

## 2022-11-22 RX ADMIN — MUPIROCIN: 20 OINTMENT TOPICAL at 09:10

## 2022-11-22 RX ADMIN — NALOXEGOL OXALATE 25 MG: 12.5 TABLET, FILM COATED ORAL at 06:48

## 2022-11-22 RX ADMIN — LOSARTAN POTASSIUM AND HYDROCHLOROTHIAZIDE 1 TABLET: 100; 25 TABLET, FILM COATED ORAL at 09:22

## 2022-11-22 RX ADMIN — ROSUVASTATIN CALCIUM 10 MG: 5 TABLET, COATED ORAL at 09:10

## 2022-11-22 RX ADMIN — TAMSULOSIN HYDROCHLORIDE 0.4 MG: 0.4 CAPSULE ORAL at 09:10

## 2022-11-22 RX ADMIN — SUCRALFATE 1 G: 1 TABLET ORAL at 20:24

## 2022-11-22 RX ADMIN — EZETIMIBE 10 MG: 10 TABLET ORAL at 09:10

## 2022-11-22 RX ADMIN — METHOCARBAMOL 1000 MG: 500 TABLET ORAL at 09:09

## 2022-11-22 RX ADMIN — SENNOSIDES AND DOCUSATE SODIUM 1 TABLET: 50; 8.6 TABLET ORAL at 09:10

## 2022-11-22 RX ADMIN — BISACODYL 5 MG: 5 TABLET, COATED ORAL at 09:10

## 2022-11-22 RX ADMIN — MUPIROCIN: 20 OINTMENT TOPICAL at 15:03

## 2022-11-22 RX ADMIN — METHOCARBAMOL 1000 MG: 500 TABLET ORAL at 15:04

## 2022-11-22 RX ADMIN — PANTOPRAZOLE SODIUM 40 MG: 40 TABLET, DELAYED RELEASE ORAL at 06:48

## 2022-11-22 RX ADMIN — OXYCODONE 10 MG: 5 TABLET ORAL at 12:18

## 2022-11-22 RX ADMIN — METHOCARBAMOL 1000 MG: 500 TABLET ORAL at 02:55

## 2022-11-22 RX ADMIN — OXYCODONE 10 MG: 5 TABLET ORAL at 21:22

## 2022-11-22 RX ADMIN — MUPIROCIN: 20 OINTMENT TOPICAL at 20:27

## 2022-11-22 RX ADMIN — OXYCODONE 10 MG: 5 TABLET ORAL at 02:57

## 2022-11-22 RX ADMIN — CITALOPRAM HYDROBROMIDE 40 MG: 20 TABLET ORAL at 09:10

## 2022-11-22 RX ADMIN — PANTOPRAZOLE SODIUM 40 MG: 40 TABLET, DELAYED RELEASE ORAL at 15:04

## 2022-11-22 RX ADMIN — OXYCODONE 10 MG: 5 TABLET ORAL at 17:16

## 2022-11-22 RX ADMIN — DIPHENHYDRAMINE HYDROCHLORIDE 25 MG: 25 TABLET ORAL at 20:24

## 2022-11-22 RX ADMIN — METHOCARBAMOL 1000 MG: 500 TABLET ORAL at 20:24

## 2022-11-22 ASSESSMENT — PAIN SCALES - GENERAL
PAINLEVEL_OUTOF10: 7
PAINLEVEL_OUTOF10: 0
PAINLEVEL_OUTOF10: 8
PAINLEVEL_OUTOF10: 8
PAINLEVEL_OUTOF10: 9
PAINLEVEL_OUTOF10: 7
PAINLEVEL_OUTOF10: 6
PAINLEVEL_OUTOF10: 10
PAINLEVEL_OUTOF10: 7

## 2022-11-22 ASSESSMENT — PAIN DESCRIPTION - DESCRIPTORS
DESCRIPTORS: SPASM
DESCRIPTORS: SPASM
DESCRIPTORS: ACHING
DESCRIPTORS: ACHING
DESCRIPTORS: ACHING;SORE
DESCRIPTORS: ACHING;SORE
DESCRIPTORS: SPASM

## 2022-11-22 ASSESSMENT — PAIN - FUNCTIONAL ASSESSMENT
PAIN_FUNCTIONAL_ASSESSMENT: ACTIVITIES ARE NOT PREVENTED
PAIN_FUNCTIONAL_ASSESSMENT: ACTIVITIES ARE NOT PREVENTED
PAIN_FUNCTIONAL_ASSESSMENT: PREVENTS OR INTERFERES SOME ACTIVE ACTIVITIES AND ADLS
PAIN_FUNCTIONAL_ASSESSMENT: ACTIVITIES ARE NOT PREVENTED
PAIN_FUNCTIONAL_ASSESSMENT: ACTIVITIES ARE NOT PREVENTED

## 2022-11-22 ASSESSMENT — PAIN DESCRIPTION - FREQUENCY
FREQUENCY: CONTINUOUS
FREQUENCY: INTERMITTENT
FREQUENCY: CONTINUOUS

## 2022-11-22 ASSESSMENT — PAIN DESCRIPTION - ORIENTATION
ORIENTATION: RIGHT;UPPER
ORIENTATION: UPPER
ORIENTATION: RIGHT;UPPER
ORIENTATION: MID
ORIENTATION: UPPER;MID
ORIENTATION: MID
ORIENTATION: RIGHT;UPPER

## 2022-11-22 ASSESSMENT — PAIN DESCRIPTION - PAIN TYPE
TYPE: SURGICAL PAIN
TYPE: SURGICAL PAIN
TYPE: ACUTE PAIN
TYPE: ACUTE PAIN
TYPE: SURGICAL PAIN
TYPE: ACUTE PAIN

## 2022-11-22 ASSESSMENT — PAIN DESCRIPTION - ONSET
ONSET: ON-GOING

## 2022-11-22 ASSESSMENT — PAIN DESCRIPTION - LOCATION
LOCATION: BACK
LOCATION: SHOULDER
LOCATION: BACK

## 2022-11-22 NOTE — PATIENT CARE CONFERENCE
Inpatient Rehabilitation  Weekly Team Conference Note  The [address]  [phone]  Patient Name: Jake Ogden        MRN: [id number]    : 1955  (61 y.o.)  Gender: male   Referring Practitioner: DO Nick  Diagnosis: debility  The team conference for this patient was held on 2022 at 10:30am by:  Naun Gay.  DO Nick    Current/Goal QM SCORES  QM Current/Goal Score   Eating CARE Score: 6 / Discharge Goal: Independent   Oral Hygiene CARE Score: 6 / Discharge Goal: Independent   Shower/Bathing CARE Score: 1 / Discharge Goal: Independent   UB Dressing CARE Score: 3 / Discharge Goal: Independent   LB Dressing CARE Score: 4 / Discharge Goal: Independent   Putting on/off Footwear CARE Score: 3 / Discharge Goal: Independent   Toileting Hygiene CARE Score: 4 / Discharge Goal: Independent   Bladder Continence      Bowel Continence      Toilet Transfers CARE Score: 4 / Discharge Goal: Independent   Shower/Bathe Self  CARE Score: 1 / Discharge Goal: Independent   Rolling Left and Right CARE Score: 4 / Discharge Goal: Independent   Sit to Lying CARE Score: 4 / Discharge Goal: Independent   Lying to Sitting on Bedside CARE Score: 4 / Discharge Goal: Independent   Sit to Stand CARE Score: 4 / Discharge Goal: Independent   Chair/Bed to Chair Transfer CARE Score: 4 / Discharge Goal: Independent   Car Transfers CARE Score: 4 / Discharge Goal: Independent   Walk 10 Feet CARE Score: 4 / Discharge Goal: Independent   Walk 50 Feet with Two Turns CARE Score: 4 / Discharge Goal: Independent   Walk 150 Feet CARE Score: 4 / Discharge Goal: Independent   Walk 10 Feet on Uneven Surfaces CARE Score: 4 / Discharge Goal: Independent   1 Step (Curb) CARE Score: 4 / Discharge Goal: Independent   4 Steps CARE Score: 4 / Discharge Goal: Independent   12 Steps CARE Score: 4 / Discharge Goal: Independent   Picking up Object from Floor CARE Score: 88 / Discharge Goal: Not Attempted   Wheel 50 Feet with 2 Turns CARE Score: 9 / Discharge Goal: Not Applicable   Type         [] Manual        [] Motorized        [] N/A   Wheel 150 Feet CARE Score: 9 / Discharge Goal: Not Applicable   Type         [] Manual        [] Motorized        [] N/A     NURSING:  A&Ox: Level of Consciousness: Alert (0)  Orientation Level: Oriented X4  Jimenes Fall Risk Score: Jimenes Total Score: 85  Admission BIMS: 14   [] Unable to complete BIMS on Admission, Reasoning:   Wounds/Incisions/Ulcers:   back incisions x2  Medication Review: reviewed with pt   Pain: back an shoulder pain  Consultations: dietitian, CM, neuro sx? Imaging:    No orders to display     Active Comorbid Conditions:  DM I  HLD  HTN    Systems Review:   Renal: w,   Neurological: x  Musculoskeletal: x  Respiratory: w  Cardiac/Circulatory/Peripheral Vascular: x  Abnormal/Relevant Test Results:   Abnormal/Relevant Lab Values:   CBC:   Recent Labs     11/23/22  0654   WBC 3.8*   HGB 11.1*   HCT 30.8*   MCV 91.4        BMP:   Recent Labs     11/23/22  0654   *   K 3.4*   CL 90*   CO2 35*   BUN 12   CREATININE 0.8     PT/INR: No results for input(s): PROTIME, INR in the last 72 hours. APTT: No results for input(s): APTT in the last 72 hours. Liver Profile:  No results found for: AST, ALT, ALB, BILIDIR, BILITOT, ALKPHOS, GGT, 5NUCNo results found for: CHOL, HDL, TRIG  Recent Labs     11/23/22  0654   WBC 3.8*   HGB 11.1*   HCT 30.8*      MCV 91.4     Recent Labs     11/23/22  0654   *   K 3.4*   CL 90*   CO2 35*   BUN 12   CREATININE 0.8     No results for input(s): AST, ALT, ALB, BILIDIR, BILITOT, ALKPHOS in the last 72 hours.   Recent Labs     11/23/22  0654   MG 2.20     Recent Labs     11/23/22  0654   WBC 3.8*   HGB 11.1*   HCT 30.8*        Recent Labs     11/23/22  0654   *   K 3.4*   CL 90*   CO2 35*   BUN 12   CREATININE 0.8   CALCIUM 8.9     No results for input(s): AST, ALT, BILIDIR, BILITOT, ALKPHOS in the last 72 hours. No results for input(s): INR in the last 72 hours. No results for input(s): Carina Gallardo in the last 72 hours. PHYSICAL THERAPY:  Bed Mobility: Scooting: Stand by assistance    Transfers:  Sit to Stand: Contact guard assistance (initially with RW progressing to no AD for the rest of session from EOB, recliner, wc, and chair with arms)  Stand to Sit: Contact guard assistance  Bed to Chair: Contact guard assistance (without AD from EOB to chair)    Ambulation  Surface: Level tile  Device: No Device, Rolling Walker  Assistance: Contact guard assistance  Quality of Gait: slow cate, steady gait - no loss of balance, forward flexed posture- Max VC for upright posture, decreased (B) step length and foot clearance  Gait Deviations: Decreased step length, Decreased step height, Slow Cate  Distance: 15'x2 + 250' + 100'  Comments: initial ambulation to/from bathroom performed with RW, rest of ambulation performed without AD. pt steady without AD  More Ambulation?: Yes    Stairs  # Steps : 12  Stairs Height: 6\"  Rails: Right ascending  Assistance: Contact guard assistance  Comment: non-reciprocal pattern for ascending and descending    OCCUPATIONAL THERAPY:  ADL  Feeding: Independent, Beverage management  Grooming: Stand by assistance, Setup, Increased time to complete, Verbal cueing, Supervision  Grooming Skilled Clinical Factors: Pt in stance at sink to complete oral hygiene, apply deoderant, and shave face w/ electric razor. SBA-spvn for standing balance and VC to maintain spinal precautions. pt seated on TTB to wash hair and face w/ setup  UE Bathing: Supervision, Setup, Increased time to complete, Verbal cueing  UE Bathing Skilled Clinical Factors: Pt seated on shower chair to complete. VC to maintain spinal precautions and setup assist w/ wash clothes. spvn for sitting balance.  pt reports completing UE bathing in stance at baseline and does not have a shower chair at home  LE Bathing Skilled Clinical Factors: Pt washed/dried upper legs while seated on shower chair w/ setup. Pt in stance at GB to wash/dry buttocks and front perineal area w/ CGA. VC required for spinal precautions and safety. Pt reports completing all components of LE bathing in stance at baseline and does not have a shower chair or grab bars at home. Assist provided to wash/dry BL lower legs/feet while pt seated on shower chair. UE Dressing: Minimal assistance, Setup, Verbal cueing, Increased time to complete  UE Dressing Skilled Clinical Factors: pt doffed/ donned gown w/ Min A to tie in back and w/ increased time while seated on shower chair. Pt seated in recliner to doff gown w/ min A to untie in back and to don t-shirt w/ setup  LE Dressing: Minimal assistance, Setup, Verbal cueing, Increased time to complete, Stand by assistance  LE Dressing Skilled Clinical Factors: Pt doffed socks while sitting in shower chair by \"kicking\" them off w/ contralateral LE. Pt reports he is not interested in a sock aid or reacher for dressing. Assist provided to don socks while seated on shower chair post showering d/t increased fatigue. Pt seated in recliner to thread BLE into shorts via figure 4 tech and required increased time. Pt completed clothing mgmt at hips in stance w/ SBA  Toileting: Stand by assistance, Setup, Increased time to complete  Toileting Skilled Clinical Factors: Pt continent of bowel [id number] during session. Pt completed clothing mgmt in stance w/ SBA and rear perihygiene while seated w/ spvn  Additional Comments: Pt requires VC to maintain spinal precautions during ADLs. Toilet Transfers: Toilet Transfers  Toilet - Technique: Ambulating  Equipment Used: Standard toilet  Toilet Transfer: Stand by assistance    Tub/ShowerTransfers:     Shower Transfers  Shower - Transfer Type: To and From  Shower - Transfer To:  Shower seat with back  Shower - Technique: Ambulating  Shower Transfers: Contact Guard  Shower Transfers Comments: + use of GB    SPEECH THERAPY:  Assessment:      NUTRITION:  Please see nutrition note for details. Weight: 212 lb (96.2 kg) / Body mass index is 31.77 kg/m². Diet Level/Order: ADULT DIET; Regular  Supplements:   Average Consumption per meal: PO Meals Eaten (%): 0%    CASE MANAGEMENT:  Psychosocial/Behavioral Issues: none  Assessment:  SW will see Pt today and do full assessment. Patient/Family Education provided by team:  Role of PT/OT, HEP, safety awareness     Patient Goals for Rehab stay:  1. go home ASAP  2.   3.      Rehab Team Goals for patient for the Upcoming Week:  1. increase independence with functional mobility   2. Increase independence with ADLs  3.     Barriers to Progress/Attainment of Goals & Efforts/Interventions to remove Barriers:  1. decreased safety awareness- continue education   2. Pain- continue PT/OT  3. Discharge Plan:  Estimated Length of Stay: 7 days  Destination: home health  Services at Discharge: [address] [place], [place] [address], Occupational Therapy, and Nursing 3x week  Equipment at Discharge: None  Community Resources:   Factors facilitating achievement of predicted outcomes: Motivated and Has needed Durable Medical Equipment at home  Barriers to the achievement of predicted outcomes: Pain, Limited safety awareness, and Limited insight into deficits    Special Needs in the Upcoming Week:   [x] Family/Caregiver Education  [] Home visit  []Therapeutic Pass [] Consults:_______   [] Family/Caregiver Training  [] Stroke Risk Factor Education     [] Other;_______     TEAM SUMMARY: Will continue with current poc & goals until anticipated d/c date of 11/25/2022.     MD:   Stroke Risk Factors:   [] N/A for this patient [x] HTN  [x]  Diabetes  [x] Hyperlipidemia  [x]Obesity BMI >25  [] Atrial Fibrillation [] Smoker (current)  [] Smoker (quit in last 12 months)  [] Sleep Apnea [] Other:     Risk for Readmission: Moderate: 13%    Justification for Continued Stay:   Criteria for continued IRF stay:  Based on my medical assessment of the patient and review of information from the interdisciplinary team, as part of this weekly team conference, the patient continues to meet the following criteria for IRF level of care:  [x] The patient requires 24-hour rehabilitation nursing care   [x] The patient requires an intensive rehabilitation therapy program  [x] The patient requires active and ongoing intervention of multiple therapy disciplines  [x] The patient requires continued physician supervision by a rehabilitation physician  [x] The patient requires an intensive and coordinated interdisciplinary team approach to the delivery of rehabilitative care    Medical Necessity-continued close physician medical management is required for:   [] Cardiac/Circulatory dysfunction  [] Respiratory/Pulmonary dysfunction  [] Integumentary complications  [] Peripheral Vascular dysfunction  [] Musculoskeletal dysfunction  [x] Neurological dysfunction d/t:  [] CVA  [x] SCI  [] TBI  [] Other: __________  [] Renal dysfunction  [] Hematologic dysfunction    [] Endocrine disorders  [] GI disorders     [] Genito-Urinary dysfunction    Assessment/Plan:  [x] The patient is making good progression towards their LTG's, is actively participating in, and has a reasonable expectation to continue to benefit from the intensive rehabilitation program.  [] The estimated discharge date has been changed from initial team conference due to:   [] The estimated discharge destination has been changed from initial team conference due to:     Rehab Team Members in attendance for Team Conference:  ARU Supervisor/PPS Coordinator:  Ardeth Castleman, PT, DPT    Therapy Manager/ARU :  Tala Piña PT, DPT    Social Work:  Debora Mckinley, [facility]    Nursing:   Estiven Sharp, MINERVA Smith, MINERVA Pastor RN    Therapy:  Ciro Richard, PT  Jairo Stafford, PT, DPT  Noa Carey PT, DPT     Sunshine Lantigua OTR/LIZZ Lau OTR/L  Sumanth Esparza, OTR/L    Douglas Givens, MA-CCC, SLP  Gwen Allen, MA-[facility], SLP  Mercedes Melendez, MAAnaliDebor[facility], SLP    Nutrition:  , RD LD    I approve the established interdisciplinary plan of care as documented within the medical record of Jaden Torres.     MD Signature Eloise Hector D.O. M.P.H  PM&R  11/23/2022  11:48 AM

## 2022-11-22 NOTE — H&P
Department of Physical Medicine & Rehabilitation  History & Physical      Patient Identification:  Beatriz Alves  : 1955  Admit date: 2022   Attending provider: Ulices De Jesus DO        Primary care provider: Vonda American STONE     Chief Complaint: Central cord stenosis     History of Present Illness/Hospital Course: Beatriz Alves is a 61 y.o. male, past medical history of type 2 diabetes, hypertension, hyperlipidemia who has been having chronic thoracic and lumbar pain with lower extremity weakness. He had an MRI done which demonstrated epidural lipomatosis severe spinal stenosis and cord compression from the T5 T6-T9 levels has severe central stenosis at L3-L4 and L4-L5 with moderate to severe stenosis at L2-L3. This likely secondary to a combination of epidural lipomatosis and facet arthropathy. Patient has been seen neurosurgery and had failed outpatient conservative management. He underwent T5-T9 decompression with resection of epidural lipomatosis with L2-L5 decompression with resection of epidural lipomatosis on 11/15. Prior Level of Function:  Independent for mobility, ADLs, and IADLs    Current Level of Function:  Mod assist     Pertinent Social History:  Support: Lives at home with wife   Home set-up: 2 story with a basement. Would need to use 13 stairs.      Past Medical History:   Diagnosis Date    Cerebral artery occlusion with cerebral infarction ([address]     TIA's x 4    Diabetes mellitus ([address]     Hyperlipidemia     Hypertension     TIA (transient ischemic attack)      Fall in past year: No    Past Surgical History:   Procedure Laterality Date    BACK SURGERY      LUMBAR X2    CARDIAC SURGERY      OPEN HEART , SEES DR RASHID NOW    CERVICAL FUSION      C 6-7    ELBOW SURGERY Bilateral     TENDON REPAIR    HAND SURGERY      x9    LUMBAR SPINE SURGERY N/A 11/15/2022    T5-T9 DECOMPRESSION WITH RESECTION OF EPIDURAL LIPOMATOSIS WITH L2-L5 DECOMPRESSION WITH RESECTION OF EPIDURAL LIPOMATOSIS performed by Arlen Julian MD at [address] [place] [address] Bilateral     ROTATOR CUFF     Major Surgery in past 100 days: yes    No family history on file. Social History     Socioeconomic History    Marital status:    Tobacco Use    Smoking status: Never    Smokeless tobacco: Never   Vaping Use    Vaping Use: Never used   Substance and Sexual Activity    Drug use: Never       Allergies   Allergen Reactions    Acetaminophen Other (See Comments)     Patient states Acetaminophen affects his glucose monitor    Atorvastatin Other (See Comments) and Myalgia       Joint pain      Ciprofloxacin Other (See Comments)     Joint and muscle pain       Cortisone Other (See Comments)     Severe HYPERGLYCEMIA      Gabapentin Other (See Comments)     Other reaction(s):  Other (See Comments)  Increased sugar readings  Increased sugar readings  Increased sugar readings  Increased sugar readings      Hydrocodone Other (See Comments)     Agitation, unable to sleep    Liraglutide Other (See Comments)     Heartburn    Simvastatin Other (See Comments) and Myalgia     Joint pain    Tetracycline     Penicillins Rash         Current Facility-Administered Medications   Medication Dose Route Frequency Provider Last Rate Last Admin    calcium carbonate (TUMS) chewable tablet 1,000 mg  1,000 mg Oral TID PRN Jorge Luis Romero, DO        citalopram (CELEXA) tablet 40 mg  40 mg Oral Daily Jorge Luis Romero, DO   40 mg at 11/22/22 0910    diphenhydrAMINE (BENADRYL) tablet 25 mg  25 mg Oral Q8H PRN Jorge Luis Romero, DO   25 mg at 11/21/22 2144    ezetimibe (ZETIA) tablet 10 mg  10 mg Oral Daily Jorge Luis Romero, DO   10 mg at 11/22/22 0910    Insulin Pump - Basal Dose  (Patient Supplied)   SubCUTAneous Daily Jorge Luis Romero, DO        Insulin Pump - Bolus Dose  (Patient Supplied)   SubCUTAneous 4x Daily AC & HS Jorge Luis Romero DO        lidocaine 4 % external patch 2 patch  2 patch TransDERmal Daily Jorge Luis Romero DO   2 patch at 11/22/22 0911    losartan-hydroCHLOROthiazide (HYZAAR) 100-25 MG per tablet 1 tablet  1 tablet Oral Daily Anjelica Halt Heis, DO   1 tablet at 11/22/22 5748    methocarbamol (ROBAXIN) tablet 1,000 mg  1,000 mg Oral Q6H Jorge Luis L Heis, DO   1,000 mg at 11/22/22 9298    metoprolol succinate (TOPROL XL) extended release tablet 100 mg  100 mg Oral Daily Jorge Luis L Heis, DO   100 mg at 11/22/22 0910    mupirocin (BACTROBAN) 2 % ointment   Nasal 4x Daily Anjelica Halt Heis, DO   Given at 11/22/22 0910    naloxegol (MOVANTIK) tablet 25 mg  25 mg Oral QAM AC Jorge Luis L Heis, DO   25 mg at 11/22/22 0648    ondansetron (ZOFRAN-ODT) disintegrating tablet 4 mg  4 mg Oral Q8H PRN Jorge Luis L Heis, DO        Or    ondansetron (ZOFRAN) injection 4 mg  4 mg IntraVENous Q6H PRN Jorge Luis L Heis, DO        oxyCODONE (ROXICODONE) immediate release tablet 5 mg  5 mg Oral Q4H PRN Jorge Luis L Heis, DO        Or    oxyCODONE (ROXICODONE) immediate release tablet 10 mg  10 mg Oral Q4H PRN Jorge Luis L Heis, DO   10 mg at 11/22/22 0648    pantoprazole (PROTONIX) tablet 40 mg  40 mg Oral BID AC Jorge Luis L Heis, DO   40 mg at 11/22/22 0648    phenol 1.4 % mouth spray 1 spray  1 spray Mouth/Throat Q2H PRN Jorge Luis L Heis, DO        rosuvastatin (CRESTOR) tablet 10 mg  10 mg Oral Daily Jorge Luis L Heis, DO   10 mg at 11/22/22 0910    sennosides-docusate sodium (SENOKOT-S) 8.6-50 MG tablet 1 tablet  1 tablet Oral BID Anjelica Halt Heis, DO   1 tablet at 11/22/22 0910    simethicone (MYLICON) chewable tablet 80 mg  80 mg Oral TID PRN Jorge Luis L Heis, DO        sodium chloride flush 0.9 % injection 5-40 mL  5-40 mL IntraVENous 2 times per day Jorge Luis Romero, DO   10 mL at 11/21/22 2146    sodium chloride flush 0.9 % injection 5-40 mL  5-40 mL IntraVENous PRN Jorge Luis Romero DO        sucralfate (CARAFATE) tablet 1 g  1 g Oral 2 times per day Jorge Luis Romero DO   1 g at 11/22/22 0919    tamsulosin (FLOMAX) capsule 0.4 mg  0.4 mg Oral Daily Jorge Luis Romero DO   0.4 mg at 11/22/22 0910    enoxaparin (LOVENOX) injection 40 mg  40 mg SubCUTAneous Daily Jorge Luis L Heis, DO   40 mg at 11/22/22 0910    bisacodyl (DULCOLAX) EC tablet 5 mg  5 mg Oral Daily Jorge Luis L Heis, DO   5 mg at 11/22/22 0910    magnesium hydroxide (MILK OF MAGNESIA) 400 MG/5ML suspension 30 mL  30 mL Oral Daily PRN Jorge Luis L Heis, DO        polyethylene glycol (GLYCOLAX) packet 17 g  17 g Oral Daily PRN Jorge Luis L Heis, DO        glucose chewable tablet 16 g  4 tablet Oral PRN Jorge Luis L Heis, DO        dextrose bolus 10% 125 mL  125 mL IntraVENous PRN Jorge Luis L Heis, DO        Or    dextrose bolus 10% 250 mL  250 mL IntraVENous PRN Jorge Luis L Heis, DO        glucagon (rDNA) injection 1 mg  1 mg SubCUTAneous PRN Jorge Luis L Heis, DO        dextrose 10 % infusion   IntraVENous Continuous PRN Jorge Luis L Heis, DO             REVIEW OF SYSTEMS:   CONSTITUTIONAL: negative for fevers, chills, diaphoresis, appetite change, night sweats, unexpected weight change, +fatigue. EYES: negative for blurred vision, eye discharge, visual disturbance and icterus. HEENT: negative for hearing loss, tinnitus, ear drainage, sinus pressure, nasal congestion, epistaxis and snoring. RESPIRATORY: Negative for hemoptysis, cough, sputum production. CARDIOVASCULAR: negative for chest pain, palpitations, exertional chest pressure/discomfort, syncope, edema   GASTROINTESTINAL: negative for nausea, vomiting, diarrhea, blood in stool, abdominal pain, constipation. GENITOURINARY: negative for frequency, dysuria, urinary incontinence, decreased urine volume, and hematuria. HEMATOLOGIC/LYMPHATIC: negative for easy bruising, bleeding and lymphadenopathy. ALLERGIC/IMMUNOLOGIC: negative for recurrent infections, angioedema, anaphylaxis and drug reactions. ENDOCRINE: negative for weight changes and diabetic symptoms including polyuria, polydipsia and polyphagia.    MUSCULOSKELETAL:positive for pain, joint swelling, decreased range of motion in lower back  NEUROLOGICAL: negative for headaches, slurred speech, unilateral weakness. PSYCHIATRIC/BEHAVIORAL: negative for hallucinations, behavioral problems, confusion and agitation. All pertinent positives are noted in the HPI. Physical Examination:  Vitals: Patient Vitals for the past 24 hrs:   BP Temp Temp src Pulse Resp SpO2 Weight   11/22/22 0908 136/65 98.2 °F (36.8 °C) Oral 79 -- 97 % --   11/21/22 2144 (!) 140/64 98.7 °F (37.1 °C) Oral 77 16 -- --   11/21/22 1730 -- -- -- -- -- -- 212 lb (96.2 kg)   11/21/22 1631 (!) 127/56 98.3 °F (36.8 °C) Oral 81 18 -- --       Const: Alert. WDWN. No distress  Eyes: Conjunctiva noninjected, no icterus noted; pupils equal, round, and reactive to light. HENT: Atraumatic, normocephalic; Oral mucosa moist  Neck: Trachea midline, neck supple. No thyromegaly noted. CV: Regular rate and rhythm, no murmur rub or gallop noted  Resp: Lungs clear to auscultation bilaterally, no rales wheezes or ronchi, no retractions. Respirations unlabored. GI: Soft, nontender, nondistended. Normal bowel sounds. No palpable masses. Skin: Normal temperature and turgor. No rashes or breakdown noted. Ext: No significant edema appreciated. No varicosities. MSK: No joint tenderness, erythema, warmth noted. AROM intact. Neuro:   -Mental status: Alert. Oriented to person, place, time, situation. 3 word immediate and delayed recall (sock, bed, blue) intact. Attention intact (months of year in reverse). -Language: Speech fluent, repetition and naming intact  -Cranial nerves: VFF, PERRL, EOMI, Facial sensation intact, Face symmetric, Hearing intact, Palate elevation symmetric, Shoulder shrug intact. Tongue midline.   -Sensation intact to light touch. -Motor examination reveals normal strength in all four limbs diffusely.   -No abnormalities with finger/nose noted. -Reflexes 2+ and symmetric.  Negative Raquel  Psych: Stable mood, normal judgement, normal affect     Lab Results Component Value Date    WBC 7.2 11/16/2022    HGB 11.6 (L) 11/19/2022    HCT 32.7 (L) 11/19/2022    MCV 93.9 11/16/2022     11/16/2022     Lab Results   Component Value Date    INR 1.07 11/15/2022    PROTIME 13.8 11/15/2022     Lab Results   Component Value Date    CREATININE 0.7 (L) 11/16/2022    BUN 9 11/16/2022     (L) 11/16/2022    K 3.9 11/16/2022    CL 93 (L) 11/16/2022    CO2 28 11/16/2022     No results found for: ALT, AST, GGT, ALKPHOS, BILITOT      No orders to display           The above laboratory data have been reviewed. The above imaging data have been reviewed. The above medical testing have been reviewed. Body mass index is 31.77 kg/m². POST ADMISSION PHYSICIAN EVALUATION  The patient has agreed to being admitted to our comprehensive inpatient rehabilitation facility and can tolerate the intensity of service consisting of at least:  --180 minutes of therapy a day, 5 out of 7 days a week. OR  --15 hours of intensive therapy within a 7 consecutive day period. The patient/family has a good understanding of our discharge process and will benefit from an interdisciplinary inpatient rehabilitation program. The patient has potential to make improvement and is in need of at least two of the following multidisciplinary therapies including but not limited to physical, occupational, respiratory, and speech, nutritional services, wound care, and prosthetics and orthotics. Given the patients complex condition and risk of further medical complications, rehabilitation services cannot be safely provided at a lower level of care such as a skilled nursing facility. All of the goals listed below were reviewed with the patient and he/she is in agreement. I have compared the patients medical and functional status at the time of the preadmission screening and the same on this date, and there are no significant changes.     By signing this document, I acknowledge that I have personally performed a full physical examination on this patient within 24 hours of admission to this inpatient rehabilitation facility and have determined the patient to be able to tolerate the above course of treatment at an intensive level for a reasonable period of time. I will be completing a detailed individualized  Plan of Care for this patient by day four of the patients stay based upon the Preadmission Screen, this Post-Admission Evaluation, and the therapy evaluations. Barriers: Decreased functional mobility, medical comorbidities  Services Required: PT, OT,   Goals: mod I  Prognosis: Good  Anticipated Dispo: home  ELOS: TBD    Rehabilitation Diagnosis:   Orthopedic, 8.9, Other Orthopedic      Assessment and Plan:  Severe central stenosis at T5-T9 with Epidural Lipomatosis/lipoma  Severe central stenosis at L2-3-4-5 with Epidural Lipomatosis/lipoma  Status post T5-T9--L2-5, laminectomy, medial facetectomy for decompression and foraminotomies,Resection of Epidural Lipoma/lipomatosis  11/15/22              -Neurosurgery following              -Continue PT/OT- ARU setting               -Pain management: Oxycodone and lidocaine patch              -Robaxin scheduled and Valium as needed for muscle spasms              -Postop incision care     Type 2 diabetes  -Continue with insulin pump  -Management per primary team     Essential hypertension              -Continue metoprolol, losartan/HCTZ     Hyperlipidemia  -continue rosuvastatin, ezetemibe     GERD  -continue PPI     History of TIAs  -continue statin. Aisha Curtis -ASA on hold       Impairments: Decreased functional mobility, Decreased ADLs    Bladder - high risk retention - Monitor PVRs, SC prn >300cc    Bowel - high risk constipation - colace BID, PRN miralax and MoM. follow bowel movements. Enema or suppository if needed.      Safety - fall precautions    PPx  DVT: lovenox  GI: pantoprazole    FULL CODE    Jerica Vasquez D.O. M.P.H  PM&R  11/22/2022  9:36 AM

## 2022-11-22 NOTE — PLAN OF CARE
Problem: Pain  Goal: Verbalizes/displays adequate comfort level or baseline comfort level  Outcome: Progressing  Note: Pt voices pain needs appropriately, pain is assessed during shift. Problem: Safety - Adult  Goal: Free from fall injury  Outcome: Progressing  Note: Client remains free from falls, bed/chair alarm in place, door open, encouraged to use call light for needs, call light is within reach, bed locked in lowest position,  Will continue to monitor.

## 2022-11-22 NOTE — PROGRESS NOTES
Physical Therapy  Facility/Department: [facility] UNIT  Rehabilitation Physical Therapy Initial Assessment    NAME: Rachel Oro  : 1955 (61 y.o.)  MRN: [id number]  CODE STATUS: Full Code    Date of Service: 22      Past Medical History:   Diagnosis Date    Cerebral artery occlusion with cerebral infarction ([address]     TIA's x 4    Hyperlipidemia     Hypertension     TIA (transient ischemic attack)     Type 1 diabetes      Past Surgical History:   Procedure Laterality Date    BACK SURGERY      LUMBAR X2    CARDIAC SURGERY      OPEN HEART 2006, SEES DR RASHID NOW    CERVICAL FUSION      C 6-7    ELBOW SURGERY Bilateral     TENDON REPAIR    HAND SURGERY      x9    LUMBAR SPINE SURGERY N/A 11/15/2022    T5-T9 DECOMPRESSION WITH RESECTION OF EPIDURAL LIPOMATOSIS WITH L2-L5 DECOMPRESSION WITH RESECTION OF EPIDURAL LIPOMATOSIS performed by Shantanu Riley MD at [address] [place] [address] Bilateral     ROTATOR CUFF       Chart Reviewed: Yes  Additional Pertinent Hx: Admit 11/15 for T5-T9 DECOMPRESSION WITH RESECTION OF EPIDURAL LIPOMATOSIS WITH L2-L5 DECOMPRESSION WITH RESECTION OF EPIDURAL LIPOMATOSIS  Family / Caregiver Present: No  Referring Practitioner: DO Nick  Diagnosis: debility  General Comment  Comments: pt supine in bed and agreeable to PT, reporting he needs to get comfortable due to how much pain hes in and also needs to use restroom    Restrictions:  Position Activity Restriction  Spinal Precautions: No Bending; No Twisting; No Lifting  Other position/activity restrictions: activity as tolerated, ambulate patient, adult diet - regular, insulin pump     SUBJECTIVE  Pain: 8/10 in back and front of chest along ribs      Prior Level of Function:  Social/Functional History  Lives With: Spouse  Type of Home: House  Home Layout: Laundry in basement, One level (13 steps with no rail)  Home Access: Stairs to enter without rails (could go through front and use walker on porch and then go into house with walker)  Entrance Stairs - Number of Steps: 3+ 1 (through the garage)  Bathroom Shower/Tub: Walk-in shower  Bathroom Toilet: Standard  Bathroom Equipment:  (none)  Home Equipment:  (none)  Has the patient had two or more falls in the past year or any fall with injury in the past year?: Yes  ADL Assistance: [address]: Independent (shares with wifes)  Ambulation Assistance: Independent (no AD)  Transfer Assistance: Independent  Active : Yes  Occupation: Part time employment  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  Hearing Exceptions: Right hearing aid    Cognition  Overall Cognitive Status: WFL    ROM  AROM RLE (degrees)  RLE AROM: WFL  AROM LLE (degrees)  LLE AROM : WFL    Strength  Strength RLE  Strength RLE: Exception  R Hip Flexion: 3+/5  R Hip ABduction: 4/5  R Hip ADduction: 4/5  R Knee Flexion: 4/5  R Knee Extension: 4/5  R Ankle Dorsiflexion: 4/5  R Ankle Plantar flexion: 4/5  Strength LLE  Strength LLE: Exception  L Hip Flexion: 3+/5  L Hip ABduction: 4/5  L Hip ADduction: 4/5  L Knee Flexion: 4/5  L Knee Extension: 4/5  L Ankle Dorsiflexion: 4/5  L Ankle Plantar Flexion: 4/5    Quality of Movement  Tone RLE  RLE Tone: Normotonic  Tone LLE  LLE Tone: Normotonic    Sensation  Overall Sensation Status: WFL    Functional Mobility  Bed mobility  Bridging: Stand by assistance  Rolling to Left: Stand by assistance  Rolling to Right: Stand by assistance  Supine to Sit: Stand by assistance  Scooting: Stand by assistance  Bed Mobility Comments: bed flat, no rail, inc time/effort 2/2 pain  Transfers  Sit to Stand: Contact guard assistance (initially with RW progressing to no AD for the rest of session from EOB, recliner, wc, and chair with arms)  Stand to Sit: Contact guard assistance  Bed to Chair: Contact guard assistance (without AD from EOB to chair)  Comment: cues for safe hand placement with transfers. pt used toilet while standing with CGA at RW  Balance  Posture: Good  Sitting - Static: Good (SBA at EOB)  Sitting - Dynamic: Fair;+  Standing - Static: Fair (CGA without AD)  Standing - Dynamic: Fair    Environmental Mobility  Ambulation  Surface: Level tile  Device: No Device;Rolling Walker  Assistance: Contact guard assistance  Quality of Gait: slow cate, steady gait - no loss of balance, forward flexed posture- Max VC for upright posture, decreased (B) step length and foot clearance  Gait Deviations: Decreased step length;Decreased step height;Slow Cate  Distance: 15'x2 + 250' + 100'  Comments: initial ambulation to/from bathroom performed with RW, rest of ambulation performed without AD. pt steady without AD  More Ambulation?: Yes  Ambulation 2  Surface - 2: ramp  Device 2: No device  Assistance 2: Contact guard assistance  Quality of Gait 2: same as above  Distance: 61'  Comments: inc pain while performing requiring pt to turn around  Christopher Tariq?: Yes  Stairs  # Steps : 12  Stairs Height: 6\"  Rails: Right ascending  Assistance: Contact guard assistance  Comment: non-reciprocal pattern for ascending and descending         Second Session:   Pt up in chair and agreeable to PT. Pt performed multiple sit <> stand transfers from recliner, wc, and chair with arms with CGA and no AD. Pt ambulated 10' to wc with no AD and CGA with above deficits and then was wheeled to car transfer. Pt performed car transfer with no AD and CGA without breaking precautions. Pt then performed 6\" curbs step with no AD or UE support on railing with CGA. Pt then wheeled back to room and performed stand pivot transfer from wc to EOB with CGA and no AD. Pt performed sit to supine with SBA and inc time/effort 2/2 to pain.  Pt educated on supine LE exercises and performed 10 reps of the following on BLEs: SLR, ankle pumps, hip abduction, hip adduction with pillow, and alternating marches while maintaining a contracted transverse abdominus. Pt then rolled onto L side with SBA and was left in bed with all needs in reach and alarm activated. ASSESSMENT       Activity Tolerance  Activity Tolerance: Patient tolerated evaluation without incident;Patient limited by pain; Patient limited by endurance  Activity Tolerance Comments: rest breaks throughout due to pain, pt agitated with inc education and questions    Assessment  Assessment: pt is a 62 yo male s/p spinal surgery from home with wife typically very IND and active prior to admission without AD. pt presenting below baseline function requiring SBA for all bed mobility and CGA for all transfers and ambulation tasks without AD including stairs. pt continues to be limited mostly by pain but also demonstrates weakness in BLEs and BUEs with decreased activity tolerance requiring frequent rest breaks. pt demonstrates poor safety awareness, can be impulsive, and requires cues to follow spinal precautions. pt will benefit from continued skilled PT to maximize independence and promote a safe dc home. Treatment Diagnosis: impaired functional mobility  Therapy Prognosis: Good  Decision Making: Medium Complexity  Discharge Recommendations: Continue to assess pending progress;24 hour supervision or assist;Patient would benefit from continued therapy after discharge;Home with Home health PT  PT D/C Equipment  Other: continue to assess- may benefit from RW  PT Equipment Recommendations  Other: continue to assess- may benefit from Anastasiya Dixon   pt is a 62 yo male s/p spinal surgery from home with wife typically very IND and active prior to admission without AD. pt presenting below baseline function requiring SBA for all bed mobility and CGA for all transfers and ambulation tasks without AD including stairs.  pt continues to be limited mostly by pain but also demonstrates weakness in BLEs and BUEs with decreased activity tolerance requiring frequent rest breaks. pt demonstrates poor safety awareness, can be impulsive, and requires cues to follow spinal precautions. pt will benefit from continued skilled PT to maximize independence and promote a safe dc home. GOALS  Patient Goals   Patient Goals : to go home  Short Term Goals  Time Frame for Short Term Goals: 7 days  Short Term Goal 1: pt will perform bed mobility with mod I  Short Term Goal 2: pt will perform functional transfers with LRAD and mod I  Short Term Goal 3: pt will ambulate 150' with LRAD and mod I  Short Term Goal 4: pt will complete 12 steps with no rail and mod I    PLAN OF CARE  Frequency: 1-2 treatment sessions per day, 5-7 days per week  Physcial Therapy Plan  Days Per Week: 5 Days  Hours Per Day: 1.5 hours  Therapy Duration: 1 Week  Current Treatment Recommendations: Functional mobility training;Transfer training;Balance training;Gait training;Stair training;Patient/Caregiver education & training; Therapeutic activities; Equipment evaluation, education, & procurement;Strengthening;Home exercise program;Safety education & training; Endurance training;Neuromuscular re-education  Safety Devices  Type of Devices: Left in chair;Nurse notified; Chair alarm in place;Call light within reach;Gait belt    EDUCATION  Education  Education Given To: Patient  Education Provided: Role of Therapy;Plan of Care;Precautions;Transfer Training;Mobility Training  Education Method: Demonstration;Verbal  Barriers to Learning: None  Education Outcome: Verbalized understanding;Continued education needed    ELOS:    1 week       Therapy Time   Individual Concurrent Group Co-treatment   Time In 0730         Time Out 0830         Minutes 60           Timed Code Treatment Minutes: 60 Minutes  Variance: 0  Second Session Therapy Time:   Individual Concurrent Group Co-treatment   Time In 930 Time Out 1000         Minutes 30            Timed Code Treatment Minutes:  60 + 30     Total Treatment Minutes:  90 min     Santos Shabazz, PT, 11/22/22 at 3:13 PM

## 2022-11-22 NOTE — PROGRESS NOTES
Comprehensive Nutrition Assessment    RECOMMENDATIONS:  PO Diet: Regular  ONS: n/a  Nutrition Education: No recommendation at this time       NUTRITION ASSESSMENT:   Nutritional summary & status: Consult for ONS. Pt new to ARU. Currently on a Regular Diet. Pt with hx of DM and limiting amount of carbohydrate consumed per day for BG control. Utilizes insulin pump. Declined offer of Glucerna, stating that it is too high in carbohydrates. Encouraged intake of high protein foods i.e. beef, chicken, eggs. Noted meal intakes of % prior to admit to ARU.  lbs. Noted wt of 218 lbs 7/7 per EMR, indicating no signficant weight loss. Continue to monitor. Admission/PMH: Admit; debility following T5-T9 decompression with resection of epidural lipomatosis with L2-L5 decompression with resection of epidural lipomatosis on 11/15. PMHx; Thoracic myelopathy, DM, HTN, HLD    MALNUTRITION ASSESSMENT      Malnutrition Status: No malnutrition    NUTRITION DIAGNOSIS   Increased nutrient needs related to increase demand for energy/nutrients as evidenced by other (comment) (extended hospital stay(rehab))    Nutrition Monitoring and Evaluation:   Food/Nutrient Intake Outcomes:  Food and Nutrient Intake  Physical Signs/Symptoms Outcomes:  Biochemical Data, Weight     OBJECTIVE DATA: Significant to nutrition assessment  Nutrition Related Findings: LBM 11/19. No edema. Glu 113. Wounds: None  Nutrition Goals: PO intake 75% or greater, prior to discharge     [facility] DIET;  Regular  PO Intake: 0%, %   PO Supplement Intake:None Ordered  Additional Sources of Calories/IVF:n/a     ANTHROPOMETRICS  Current Height: 5' 8.5\" (174 cm)  Current Weight: 212 lb (96.2 kg)    Admission weight: 212 lb (96.2 kg)  Ideal Body Weight (IBW): 157 lbs  (71 kg)            COMPARATIVE STANDARDS  Energy (kcal):  5605-2246 (15-18 kcal/CBW/96.2 kg)     Protein (g):   (1-1.2 gm/CBW/96.2 kg)       Fluid (mL/day): 2493-0231 (1 ml/kcal) or per provider    The patient will be monitored per nutrition standards of care. Consult dietitian if additional nutrition interventions are needed prior to RD reassessment.      Bryn Fischer, [address] [facility]:  [phone]  Office:  [phone]

## 2022-11-22 NOTE — PROGRESS NOTES
Occupational Therapy  Facility/Department: [facility] ACUTE REHAB UNIT  Occupational Therapy Initial Assessment/Treatment    Name: Eliel Dallas  : 1955  MRN: [id number]  Date of Service: 2022    Discharge Recommendations:  Continue to assess pending progress, 24 hour supervision or assist, Home with Home health OT  OT Equipment Recommendations  Equipment Needed: Yes  Mobility Devices: ADL Assistive Devices  ADL Assistive Devices: Shower Chair with back  Other: cont to assess additional needs pending progress       Patient Diagnosis(es): There were no encounter diagnoses. Past Medical History:  has a past medical history of Cerebral artery occlusion with cerebral infarction ([facility] [address] Hyperlipidemia, Hypertension, TIA (transient ischemic attack), and Type 1 diabetes. Past Surgical History:  has a past surgical history that includes Cardiac surgery; Hand surgery; cervical fusion; shoulder surgery (Bilateral); Elbow surgery (Bilateral); back surgery; and Lumbar spine surgery (N/A, 11/15/2022). Treatment Diagnosis: impaired ADLs and functional mobility      Assessment   Performance deficits / Impairments: Decreased functional mobility ; Decreased endurance;Decreased ADL status; Decreased balance;Decreased strength;Decreased safe awareness;Decreased high-level IADLs;Decreased posture  Assessment: Pt is a 62 y/o M who presents s/p T5-T9 DECOMPRESSION WITH RESECTION OF EPIDURAL LIPOMATOSIS WITH L2-L5 DECOMPRESSION WITH RESECTION OF EPIDURAL LIPOMATOSIS. Pt is from home w/ wife and was independent w/ all ADLs/IADLs, worked part-time, and ambulated w/o AD pta. Pt currently requires SBA-CGA for functional transfers, CGA for functional mobility w/o AD, and increased assist to complete all ADLs d/t pain, decreased balance, and generalized weakness. Pt is motivated to return home at dc and would benefit from ongoing inpt OT services to maximize safety and functional independence prior to dc.  Cont OT per POC  Treatment Diagnosis: impaired ADLs and functional mobility  Prognosis: Good  Decision Making: Medium Complexity  REQUIRES OT FOLLOW-UP: Yes  Activity Tolerance  Activity Tolerance: Patient limited by pain; Patient limited by fatigue  Activity Tolerance Comments: Pt limited by pain this date and required increased time and encouragement to complete tasks        Plan   Occupational Therapy Plan  Times Per Week: 5x/week, 90min/day  Current Treatment Recommendations: ROM, Balance training, Pain management, Self-Care / ADL, Return to work related activity, Functional mobility training, Safety education & training, Endurance training, Neuromuscular re-education, Patient/Caregiver education & training, Equipment evaluation, education, & procurement     Restrictions  Position Activity Restriction  Spinal Precautions: No Bending, No Twisting, No Lifting  Other position/activity restrictions: activity as tolerated, ambulate patient, adult diet - regular, insulin pump    Subjective   General  Chart Reviewed: Yes  Patient assessed for rehabilitation services?: Yes  Additional Pertinent Hx: 61 y.o. male with c/o chronic thoracic pain, lumbar pain  bilateral LE pain and weakness. Pt is POD #1 from T5-T9 DECOMPRESSION WITH RESECTION OF EPIDURAL LIPOMATOSIS WITH L2-L5 DECOMPRESSION WITH RESECTION OF EPIDURAL LIPOMATOSIS. PMHx of TIAs, HTN, DM. Admitted to ARU 11/21  Family / Caregiver Present: Yes (pt's wife arriving at end of session)  Referring Practitioner: Blanca Romero DO  Diagnosis: Debility  Subjective  Subjective: Pt standing up in room w/ RN present upon arrival. Pt reporting need to use restroom.  Pt agreeable to OT eval and treat w/ encouragement       Social/Functional History  Social/Functional History  Lives With: Spouse  Type of Home: House  Home Layout: Laundry in basement, One level (13 steps with no rail)  Home Access: Stairs to enter without rails (could go through front and use walker on porch and then go into house with walker)  Entrance Stairs - Number of Steps: 3+ 1 (through the garage)  Bathroom Shower/Tub: Walk-in shower  Bathroom Toilet: Standard  Bathroom Equipment:  (none)  Home Equipment:  (none)  Has the patient had two or more falls in the past year or any fall with injury in the past year?: Yes  ADL Assistance: [address]: Independent (shares with wife)  Ambulation Assistance: Independent (no AD)  Transfer Assistance: Independent  Active : Yes  Occupation: Part time employment  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  [address]: camping, fishing, very active  Additional Comments: wife works as RN on the weekends       Objective           Observation/Palpation  Observation: posterior incisions    Safety Devices  Type of Devices: Left in chair;Nurse notified; Chair alarm in place;Call light within reach    Balance  Sitting: Intact  Standing: With support (Spvn-SBA static; SBA-CGA dynamic)  Gait  Overall Level of Assistance: Contact-guard assistance (pt ambulated to/from bathroom w/ CGA)  Interventions: Safety awareness training;Verbal cues  Speed/Christine: Slow  Assistive Device: Gait belt    Toilet Transfers  Toilet - Technique: Ambulating  Equipment Used: Standard toilet  Toilet Transfer: Stand by assistance  Shower Transfers  Shower - Transfer Type: To and From  Shower - Transfer To: Shower seat with back  Shower - Technique: Ambulating  Shower Transfers: Contact Guard  Shower Transfers Comments: + use of GB    AROM: Within functional limits  PROM: Within functional limits  Strength: Within functional limits  Coordination: Within functional limits  Tone: Normal  Sensation: Intact    ADL  Feeding: Independent; Beverage management  Grooming: Stand by assistance;Setup; Increased time to complete;Verbal cueing;Supervision  Grooming Skilled Clinical Factors: Pt in stance at sink to complete oral hygiene, apply deoderant, and shave face w/ electric razor.  SBA-spvn for standing balance and VC to maintain spinal precautions. pt seated on TTB to wash hair and face w/ setup  UE Bathing: Supervision;Setup; Increased time to complete;Verbal cueing  UE Bathing Skilled Clinical Factors: Pt seated on shower chair to complete. VC to maintain spinal precautions and setup assist w/ wash clothes. spvn for sitting balance. pt reports completing UE bathing in stance at baseline and does not have a shower chair at home  LE Bathing Skilled Clinical Factors: Pt washed/dried upper legs while seated on shower chair w/ setup. Pt in stance at GB to wash/dry buttocks and front perineal area w/ CGA. VC required for spinal precautions and safety. Pt reports completing all components of LE bathing in stance at baseline and does not have a shower chair or grab bars at home. Assist provided to wash/dry BL lower legs/feet while pt seated on shower chair. UE Dressing: Minimal assistance;Setup;Verbal cueing; Increased time to complete  UE Dressing Skilled Clinical Factors: pt doffed/ donned gown w/ Min A to tie in back and w/ increased time while seated on shower chair. Pt seated in recliner to doff gown w/ min A to untie in back and to don t-shirt w/ setup  LE Dressing: Minimal assistance;Setup;Verbal cueing; Increased time to complete;Stand by assistance  LE Dressing Skilled Clinical Factors: Pt doffed socks while sitting in shower chair by \"kicking\" them off w/ contralateral LE. Pt reports he is not interested in a sock aid or reacher for dressing. Assist provided to don socks while seated on shower chair post showering d/t increased fatigue. Pt seated in recliner to thread BLE into shorts via figure 4 tech and required increased time. Pt completed clothing mgmt at hips in stance w/ SBA  Toileting: Stand by assistance;Setup; Increased time to complete  Toileting Skilled Clinical Factors: Pt continent of bowel [id number] during session.  Pt completed clothing mgmt in stance w/ SBA and rear perihygiene while seated w/ spvn  Additional Comments: Pt requires VC to maintain spinal precautions during ADLs. Transfers  Sit to stand: Stand by assistance  Stand to sit: Stand by assistance  Transfer Comments: VC to maintain spinal precautions and for hand placement  Vision  Vision: Impaired  Vision Exceptions: Wears glasses for reading  Hearing  Hearing: Exceptions to [facility]  Hearing Exceptions: Right hearing aid    Cognition  Overall Cognitive Status: WFL  Following Commands: Follows one step commands with increased time  Attention Span: Attends with cues to redirect  Memory:  (pt reports he has impaired memory secondary to history of CVA)  Safety Judgement: Decreased awareness of need for safety (Pt impulsive w/ decreased awareness of need for gait belt and assist w/ mobility)  Insights: Decreased awareness of deficits  Initiation: Requires cues for some  Sequencing: Requires cues for some  Orientation  Overall Orientation Status: Within Normal Limits  Orientation Level: Oriented X4  Perception  Overall Perceptual Status: WFL                 Education Given To: Patient; Family  Education Provided: Role of Therapy;Plan of Care;Precautions; ADL Adaptive Strategies;Transfer Training;Equipment  Education Method: Verbal;Demonstration  Education Outcome: Verbalized understanding;Continued education needed    LUE AROM (degrees)  LUE AROM : WFL  Left Hand AROM (degrees)  Left Hand AROM: WFL  RUE AROM (degrees)  RUE AROM : WFL  Right Hand AROM (degrees)  Right Hand AROM: WFL        Hand Dominance  Hand Dominance: Right             Second Session  Pt seated in recliner chair upon arrival w/ wife present. Pt pleasant and agreeable to OT session. Pt reporting pain has improved since RN recently provided pain medication. Pt demo sit<>stand transfers w/ SBA and VC for hand placement. Pt ambulated to/from therapy gym w/ CGA.  Pt completed the following dynamic standing balance activity in order to increased activity tolerance and balance required for independence w/ ADLs and IADLs. Pt in stance on airex to complete \"golf toss\" game w/ Min A initially d/t slight posterior LOB, however progressing to CGA. Pt instructed to maintain standing balance on unstable surface (airex) and toss multiple bags onto designated targets on the floor. Pt able to complete activity w/ Min A-CGA for standing balance. Once returned to room, pt demo sit>supine via log roll w/ SBA and use of hand rail.  Pt left in bed w/ bed alarm on, needs met, and call light w/in reach at end of session            Goals  Short Term Goals  Time Frame for Short Term Goals: 7 days  Short Term Goal 1: Pt will complete LE dressing w/ Mod I  Short Term Goal 2: Pt will complete toilet transfer w/ Mod I  Short Term Goal 3: Pt will complete toileting w/ Mod I  Short Term Goal 4: Pt will independently complete grooming in stance at sink  Short Term Goal 5: Pt will maintain spinal precautions during 100% of ADL tasks  Patient Goals   Patient goals : to go home       Therapy Time   1st Session Concurrent Group 2nd Session   Time In 1100     1245   Time Out 1215     1300   Minutes 75     15           Timed Code Treatment Minutes:  75    Total Treatment Minutes:  [address] [facility] darius, OT

## 2022-11-22 NOTE — PROGRESS NOTES
Patient refusing accuchecks since he has his own insulin pump and meter. Patients meter says BS is 112.  No need for insulin

## 2022-11-23 LAB
ANION GAP SERPL CALCULATED.3IONS-SCNC: 10 MMOL/L (ref 3–16)
BASOPHILS ABSOLUTE: 0 K/UL (ref 0–0.2)
BASOPHILS RELATIVE PERCENT: 0.4 %
BUN BLDV-MCNC: 12 MG/DL (ref 7–20)
CALCIUM SERPL-MCNC: 8.9 MG/DL (ref 8.3–10.6)
CHLORIDE BLD-SCNC: 90 MMOL/L (ref 99–110)
CO2: 35 MMOL/L (ref 21–32)
CREAT SERPL-MCNC: 0.8 MG/DL (ref 0.8–1.3)
EOSINOPHILS ABSOLUTE: 0.1 K/UL (ref 0–0.6)
EOSINOPHILS RELATIVE PERCENT: 3.6 %
GFR SERPL CREATININE-BSD FRML MDRD: >60 ML/MIN/{1.73_M2}
GLUCOSE BLD-MCNC: 106 MG/DL (ref 70–99)
HCT VFR BLD CALC: 30.8 % (ref 40.5–52.5)
HEMOGLOBIN: 11.1 G/DL (ref 13.5–17.5)
LYMPHOCYTES ABSOLUTE: 0.9 K/UL (ref 1–5.1)
LYMPHOCYTES RELATIVE PERCENT: 24.7 %
MAGNESIUM: 2.2 MG/DL (ref 1.8–2.4)
MCH RBC QN AUTO: 33 PG (ref 26–34)
MCHC RBC AUTO-ENTMCNC: 36.1 G/DL (ref 31–36)
MCV RBC AUTO: 91.4 FL (ref 80–100)
MONOCYTES ABSOLUTE: 0.7 K/UL (ref 0–1.3)
MONOCYTES RELATIVE PERCENT: 18.4 %
NEUTROPHILS ABSOLUTE: 2 K/UL (ref 1.7–7.7)
NEUTROPHILS RELATIVE PERCENT: 52.9 %
PDW BLD-RTO: 12.8 % (ref 12.4–15.4)
PLATELET # BLD: 301 K/UL (ref 135–450)
PMV BLD AUTO: 7.1 FL (ref 5–10.5)
POTASSIUM REFLEX MAGNESIUM: 3.4 MMOL/L (ref 3.5–5.1)
RBC # BLD: 3.37 M/UL (ref 4.2–5.9)
SODIUM BLD-SCNC: 135 MMOL/L (ref 136–145)
WBC # BLD: 3.8 K/UL (ref 4–11)

## 2022-11-23 PROCEDURE — 6360000002 HC RX W HCPCS: Performed by: PHYSICAL MEDICINE & REHABILITATION

## 2022-11-23 PROCEDURE — 97530 THERAPEUTIC ACTIVITIES: CPT

## 2022-11-23 PROCEDURE — 97110 THERAPEUTIC EXERCISES: CPT

## 2022-11-23 PROCEDURE — 83735 ASSAY OF MAGNESIUM: CPT

## 2022-11-23 PROCEDURE — 85025 COMPLETE CBC W/AUTO DIFF WBC: CPT

## 2022-11-23 PROCEDURE — 97116 GAIT TRAINING THERAPY: CPT

## 2022-11-23 PROCEDURE — 80048 BASIC METABOLIC PNL TOTAL CA: CPT

## 2022-11-23 PROCEDURE — 6370000000 HC RX 637 (ALT 250 FOR IP): Performed by: PHYSICAL MEDICINE & REHABILITATION

## 2022-11-23 PROCEDURE — 1280000000 HC REHAB R&B

## 2022-11-23 PROCEDURE — 36415 COLL VENOUS BLD VENIPUNCTURE: CPT

## 2022-11-23 PROCEDURE — 99233 SBSQ HOSP IP/OBS HIGH 50: CPT | Performed by: PHYSICAL MEDICINE & REHABILITATION

## 2022-11-23 PROCEDURE — 94660 CPAP INITIATION&MGMT: CPT

## 2022-11-23 RX ADMIN — OXYCODONE 10 MG: 5 TABLET ORAL at 15:21

## 2022-11-23 RX ADMIN — LOSARTAN POTASSIUM AND HYDROCHLOROTHIAZIDE 1 TABLET: 100; 25 TABLET, FILM COATED ORAL at 09:03

## 2022-11-23 RX ADMIN — METHOCARBAMOL 1000 MG: 500 TABLET ORAL at 15:22

## 2022-11-23 RX ADMIN — MUPIROCIN: 20 OINTMENT TOPICAL at 20:49

## 2022-11-23 RX ADMIN — PANTOPRAZOLE SODIUM 40 MG: 40 TABLET, DELAYED RELEASE ORAL at 06:17

## 2022-11-23 RX ADMIN — ENOXAPARIN SODIUM 40 MG: 100 INJECTION SUBCUTANEOUS at 09:06

## 2022-11-23 RX ADMIN — MUPIROCIN: 20 OINTMENT TOPICAL at 09:05

## 2022-11-23 RX ADMIN — METHOCARBAMOL 1000 MG: 500 TABLET ORAL at 01:37

## 2022-11-23 RX ADMIN — ROSUVASTATIN CALCIUM 10 MG: 5 TABLET, COATED ORAL at 09:04

## 2022-11-23 RX ADMIN — MUPIROCIN: 20 OINTMENT TOPICAL at 17:14

## 2022-11-23 RX ADMIN — OXYCODONE 10 MG: 5 TABLET ORAL at 20:36

## 2022-11-23 RX ADMIN — METHOCARBAMOL 1000 MG: 500 TABLET ORAL at 09:03

## 2022-11-23 RX ADMIN — EZETIMIBE 10 MG: 10 TABLET ORAL at 09:03

## 2022-11-23 RX ADMIN — OXYCODONE 10 MG: 5 TABLET ORAL at 06:17

## 2022-11-23 RX ADMIN — OXYCODONE 10 MG: 5 TABLET ORAL at 10:24

## 2022-11-23 RX ADMIN — SUCRALFATE 1 G: 1 TABLET ORAL at 09:03

## 2022-11-23 RX ADMIN — MUPIROCIN: 20 OINTMENT TOPICAL at 12:41

## 2022-11-23 RX ADMIN — PANTOPRAZOLE SODIUM 40 MG: 40 TABLET, DELAYED RELEASE ORAL at 15:21

## 2022-11-23 RX ADMIN — DICLOFENAC SODIUM 2 G: 10 GEL TOPICAL at 22:33

## 2022-11-23 RX ADMIN — NALOXEGOL OXALATE 25 MG: 12.5 TABLET, FILM COATED ORAL at 06:17

## 2022-11-23 RX ADMIN — METHOCARBAMOL 1000 MG: 500 TABLET ORAL at 20:33

## 2022-11-23 RX ADMIN — CITALOPRAM HYDROBROMIDE 40 MG: 20 TABLET ORAL at 09:03

## 2022-11-23 RX ADMIN — OXYCODONE 10 MG: 5 TABLET ORAL at 01:37

## 2022-11-23 RX ADMIN — METOPROLOL SUCCINATE 100 MG: 50 TABLET, EXTENDED RELEASE ORAL at 09:04

## 2022-11-23 RX ADMIN — TAMSULOSIN HYDROCHLORIDE 0.4 MG: 0.4 CAPSULE ORAL at 09:03

## 2022-11-23 ASSESSMENT — PAIN DESCRIPTION - ORIENTATION
ORIENTATION: RIGHT;UPPER
ORIENTATION: MID
ORIENTATION: RIGHT;UPPER
ORIENTATION: MID;UPPER
ORIENTATION: MID
ORIENTATION: RIGHT;UPPER

## 2022-11-23 ASSESSMENT — PAIN DESCRIPTION - PAIN TYPE
TYPE: ACUTE PAIN
TYPE: SURGICAL PAIN;ACUTE PAIN
TYPE: ACUTE PAIN

## 2022-11-23 ASSESSMENT — PAIN DESCRIPTION - ONSET
ONSET: ON-GOING

## 2022-11-23 ASSESSMENT — PAIN SCALES - GENERAL
PAINLEVEL_OUTOF10: 8
PAINLEVEL_OUTOF10: 8
PAINLEVEL_OUTOF10: 0
PAINLEVEL_OUTOF10: 6
PAINLEVEL_OUTOF10: 7
PAINLEVEL_OUTOF10: 8
PAINLEVEL_OUTOF10: 8
PAINLEVEL_OUTOF10: 9
PAINLEVEL_OUTOF10: 8
PAINLEVEL_OUTOF10: 6
PAINLEVEL_OUTOF10: 8
PAINLEVEL_OUTOF10: 7
PAINLEVEL_OUTOF10: 8

## 2022-11-23 ASSESSMENT — PAIN DESCRIPTION - DESCRIPTORS
DESCRIPTORS: ACHING;STABBING;CRAMPING
DESCRIPTORS: SPASM
DESCRIPTORS: ACHING
DESCRIPTORS: ACHING;STABBING;THROBBING
DESCRIPTORS: SPASM
DESCRIPTORS: SPASM

## 2022-11-23 ASSESSMENT — PAIN DESCRIPTION - FREQUENCY
FREQUENCY: CONTINUOUS

## 2022-11-23 ASSESSMENT — PAIN - FUNCTIONAL ASSESSMENT
PAIN_FUNCTIONAL_ASSESSMENT: ACTIVITIES ARE NOT PREVENTED
PAIN_FUNCTIONAL_ASSESSMENT: PREVENTS OR INTERFERES SOME ACTIVE ACTIVITIES AND ADLS
PAIN_FUNCTIONAL_ASSESSMENT: PREVENTS OR INTERFERES SOME ACTIVE ACTIVITIES AND ADLS

## 2022-11-23 ASSESSMENT — PAIN DESCRIPTION - LOCATION
LOCATION: SHOULDER
LOCATION: BACK
LOCATION: SHOULDER
LOCATION: SHOULDER
LOCATION: BACK

## 2022-11-23 NOTE — PROGRESS NOTES
Pt assessment and vitals completed. Medications administered as ordered. Pt complains of spasms and pain in R shoulder blade, robaxin given, pt repositioned and  resting n bed. Will continue to monitor.

## 2022-11-23 NOTE — PROGRESS NOTES
Occupational Therapy  Facility/Department: [facility] ACUTE REHAB UNIT  Rehabilitation Occupational Therapy Daily Treatment Note    Date: 22  Patient Name: Nancy Walton       Room: 5147/1536-59  MRN: [id number]  Account: [de-identified]   : 1955  (64 y.o.) Gender: male                    Past Medical History:  has a past medical history of Cerebral artery occlusion with cerebral infarction ([address] Hyperlipidemia, Hypertension, TIA (transient ischemic attack), and Type 1 diabetes. Past Surgical History:   has a past surgical history that includes Cardiac surgery; Hand surgery; cervical fusion; shoulder surgery (Bilateral); Elbow surgery (Bilateral); back surgery; and Lumbar spine surgery (N/A, 11/15/2022). Restrictions  Other position/activity restrictions: activity as tolerated, ambulate patient, adult diet - regular, insulin pump    Subjective  Subjective: Pt sitting in bedside recliner upon arrival, pleasant and agreeable to OT session. Objective     Cognition  Overall Cognitive Status: WFL  Orientation  Overall Orientation Status: Within Normal Limits  Orientation Level: Oriented X4         ADL  Feeding  Assistance Level: Independent  Skilled Clinical Factors: beverage management    Instrumental ADL's  Instrumental ADLs: Yes  Meal Prep  Meal Prep Level of Assistance: Modified independent  Meal Preparation: Pt prepared coffee as pt reports being responsible for completion at home. Pt retrieved coffee beans, filter and coffee pot from countertop i'ly and sequenced all steps without difficulty. Pt able to operate novel coffee machine without VCs and pour coffee beans into the filter i'ly. Pt poured coffee into coffee mug i'ly and transported coffee cup to the dining room table i'ly. Pt tolerated stance for ~6 minutes for duration of task.   Light Housekeeping  Light Housekeeping Level:  (no AD)  Light Housekeeping Level of Assistance: Supervision  Light Housekeeping: Pt reports completing laundry at home and is hopeful to return to doing so when he d/c's home/ Pt educated on importance of maintaining spinal precautions when placing and retrieving clothing items from washer and dryer and also provided with altnerative solution to sit in a chair when loading the clothes to eliminate risk for breaking precautions and to minimize pain. Pt verbalized understanding. Pt retrieved various clothing items placed around dining room and placed into the below waist height washing machine in stance using reacher with spvn assist. Pt closed washing machine with VC to flex knees to reach in order to maintain spinal precautions. Pt then utilized reacher to retrieve clothes from washing machine and transport into dryer with spvn assist. Pt maintained spinal precautions and had no difficulty maintaining stance ~5 minutes for duration of activity. Functional Mobility  Device:  (no AD)  Activity: Retrieve items;Transport items (to/from dining room)  Assistance Level: Supervision  Sit to Supine  Assistance Level: Supervision  Skilled Clinical Factors: HOB flat, no use of bed rails, good adherence to spinal precautions  Sit to Stand  Assistance Level: Supervision  Stand to Sit  Assistance Level: Supervision         Second Session: Pt supine in bed upon arrival, pleasant and agreeable to OT session. Supine<Sit via reverse log roll i'ly with good adherence to spinal precautions. STS from EOB i'ly. In order to improve community reintegration as pt enjoys grocery shopping, pt completed functional mobility to gift shop ~630' without a rest break with spvn including navigating in/out of elevators and ascending ramp. Pt able to easily navigate carpeted floor and tight aisles of gift shop with spvn. Pt requested a seated rest break before heading back to room. Stand<sit i'ly to chair. Of note pt's insulin pump began to alert and notified pt his blood glucose dropped to 65.  After prolonged rest break pt assisted to w/c back to room for safety due to low blood glucose. STS from w/c i'ly and short ambulation back to recliner with spvn. Stand<sit to recliner i'ly. Pt provided with lunch tray, pt observed to be indepenent with self feeding. Pt left in bedside recliner at end of session with chair alarm engaged, call light within reach and all needs met. Assessment  Assessment  Assessment: Pt demonstrated indpendence with simple meal prep to make coffee and able to complete laundry simulation task with spvn this date with use of reacher. Pt able to i'ly verbalize 2/3 spinal precautions but does a good job of adhering to them during functional tasks. Pt is eager to return home ASAP but his biggest limitation is pain. Cont per OT POC. Activity Tolerance: Patient tolerated treatment well;Patient limited by pain  Discharge Recommendations: Continue to assess pending progress;24 hour supervision or assist;Home with Home health OT  OT Equipment Recommendations  Equipment Needed: Yes  ADL Assistive Devices: Shower Chair with back  Other: cont to assess additional needs pending progress  Safety Devices  Safety Devices in place: Yes  Type of devices: Nurse notified; Bed alarm in place; Left in bed    Patient Education  Education  Education Given To: Patient  Education Provided: Role of Therapy;Plan of Care;Transfer Training;Precautions; Safety;DME/Home Modifications;IADL Function  Education Provided Comments: Pt educated on adaptations when completing laundry with use of reacher or seated to maintain spinal precautions; pt educated on importance of bending at knees to maintain neutral spine if needing to retrieve an item below waist level from kitchen, sitting to complete with use of reacher or requesting assistance from wife  Education Method: Demonstration  Barriers to Learning: None  Education Outcome: Verbalized understanding;Demonstrated understanding    Plan  Occupational Therapy Plan  Times Per Week: 5x/week, 90min/day  Current Treatment Recommendations: ROM;Balance training;Pain management;Self-Care / ADL;Return to work related activity; Functional mobility training; Safety education & training; Endurance training;Neuromuscular re-education;Patient/Caregiver education & training;Equipment evaluation, education, & procurement    Goals  Patient Goals   Patient goals : to go home  Short Term Goals  Time Frame for Short Term Goals: 7 days-all ongoing  Short Term Goal 1: Pt will complete LE dressing w/ Mod I  Short Term Goal 2: Pt will complete toilet transfer w/ Mod I  Short Term Goal 3: Pt will complete toileting w/ Mod I  Short Term Goal 4: Pt will independently complete grooming in stance at sink  Short Term Goal 5: Pt will maintain spinal precautions during 100% of ADL tasks    AM-PAC Score               Therapy Time   Individual Second Session Group Co-treatment   Time In 0930  1130       Time Out 1030  1200       Minutes 60  30       Timed Code Treatment Minutes: 60 Minutes+ 30 Minutes  Total Treatment Time: 90 Minutes       Linda Murdock, OT

## 2022-11-23 NOTE — PROGRESS NOTES
Physical Therapy  Facility/Department: [facility] ACUTE REHAB UNIT  Rehabilitation Physical Therapy Treatment Note    NAME: Pushpa Moore  : 1955 (61 y.o.)  MRN: [id number]  CODE STATUS: Full Code    Date of Service: 22       Restrictions:  Position Activity Restriction  Spinal Precautions: No Bending; No Twisting; No Lifting  Other position/activity restrictions: activity as tolerated, ambulate patient, adult diet - regular, insulin pump     SUBJECTIVE  Subjective  Subjective: Pt seated in chair alert and agreeable to session  Pain: 7/10 in back and front of chest along ribs        Post Treatment Pain Screening         OBJECTIVE  Cognition  Overall Cognitive Status: WFL  Following Commands: Follows one step commands with increased time  Attention Span: Attends with cues to redirect  Memory:  (pt reports he has impaired memory secondary to history of CVA)  Safety Judgement: Decreased awareness of need for safety (Pt impulsive w/ decreased awareness of need for gait belt and assist w/ mobility)  Insights: Decreased awareness of deficits  Initiation: Requires cues for some  Sequencing: Requires cues for some  Cognition Comment: Pt impulsive and decreased safety awareness. Pt refused to allow therapist to assist in bathroom . Increased aggitation when therapist explained fall risk . Pt needed max safety cues for lower body dressing and cues for precautions  Orientation  Overall Orientation Status: Within Normal Limits  Orientation Level: Oriented X4    Functional Mobility  Balance  Sitting Balance: Independent  Standing Balance: Supervision  Standing Balance  Activity: Pt supervision to stand no AD . See exercises for dynamic balance  Transfers  Surface: From chair with arms;Standard toilet;From chair without arms  Additional Factors: Hand placement cues; Verbal cues  Device:  (no AD)  Sit to Stand  Assistance Level: Supervision  Skilled Clinical Factors: Pt needs cues for hand placement and safety awareness  Stand to Sit  Assistance Level: Supervision  Skilled Clinical Factors: cues for controlled descent      Environmental Mobility  Ambulation  Surface: Level surface; Ramp  Device:  (no AD)  Distance: 100 ft , 15ft, 500+ ft with 75 ft ascend and descent ramp, short distanaces in gym  Activity: Within Room; Within Unit  Assistance Level: Supervision  Gait Deviations: Slow cate;Decreased arm swing bilateral;Decreased step length bilateral;Wide base of support  Skilled Clinical Factors: Cues for step height and length , cues for posture,and cues for BLE neutral hip alignment due to BLE hip ER with ambulation  Stairs  Stair Height: 6''  Device:  (no rails)  Number of Stairs: 18  Additional Factors: Reciprocal going up;Non-reciprocal going down; Increased time to complete  Assistance Level: Contact guard assist  Skilled Clinical Factors: cues for safety awareness             PT Exercises  Exercise Treatment: Scifit level 2 , BLE /BUE x 1 min then BLE only x 1 min x 3 reps both to increase activity tolerance and sttrength . Pt RPE 6/10  Static Standing Balance Exercises: Standing balance on uneven surface (airex) holding 2 lb ball 2x10 chest press and 2x 10 shoulder flexion to 90 degrees . Pt CGA throughout and no LOB. Dynamic Standing Balance Exercises: Altn. toe taps on uneven surface (airex) 2x 10 BLE forward and lateral with CGA . 1 LOB to left min A to correct    Second Session: Pt supine in bed and reports back pain 7/10 , RN aware. Pt supine <> sit in bed with rail and HOB elevated and flat mat table to rails with supervision. Cues for log roll. Pt ambulated 1x 100 and 1 x 220 ft with no AD and S. Cues for narrow SUZANNE, neutral hip alignment, and safety awareness fransico turns. Pt with no LOB noted. Pt then performed supine exerises 2x 10 BLE SLR, hip abd, hip add, SAQ, TA, TA with marches, dead bugs. Cues for form . Pt given HEP hand out and educated to complete dailly. Pt verbalized understanding.  Pt left supine in bed with alarm on, call light in reach, all needs met , ice on back. ASSESSMENT/PROGRESS TOWARDS GOALS       Assessment  Assessment: Pt tolerated well. Pt supervision for transfers and gait with no AD. Pt limited by pain, balance, and safety awareness. Pt is below baseline of indepedent and requires continued skilled PT to [facility] rehab potential  Activity Tolerance: Patient limited by pain; Patient limited by endurance; Patient tolerated treatment well  Discharge Recommendations: 24 hour supervision or assist;Home with Home health PT  PT Equipment Recommendations  Equipment Needed: No    Goals  Patient Goals   Patient Goals : to go home  Short Term Goals  Time Frame for Short Term Goals: 7 days  Short Term Goal 1: pt will perform bed mobility with mod I  Short Term Goal 2: pt will perform functional transfers with LRAD and mod I  Short Term Goal 3: pt will ambulate 150' with LRAD and mod I  Short Term Goal 4: pt will complete 12 steps with no rail and mod I    PLAN OF CARE/SAFETY  Physcial Therapy Plan  Days Per Week: 5 Days  Hours Per Day: 1.5 hours  Therapy Duration: 1 Week  Current Treatment Recommendations: Functional mobility training;Transfer training;Balance training;Gait training;Stair training;Patient/Caregiver education & training; Therapeutic activities; Equipment evaluation, education, & procurement;Strengthening;Home exercise program;Safety education & training; Endurance training;Neuromuscular re-education  Safety Devices  Type of Devices: Left in chair;Nurse notified; Chair alarm in place;Call light within reach;Gait belt    EDUCATION  Education  Education Given To: Patient  Education Provided: Role of Therapy;Plan of Care;Precautions;Transfer Training;Mobility Training; Safety; Fall Prevention Strategies  Education Method: Demonstration;Verbal  Barriers to Learning: None  Education Outcome: Verbalized understanding;Continued education needed        Therapy Time   Individual Concurrent Group Co-treatment   Time In 0730         Time Out 0830         Minutes 60         Second Session Therapy Time:   Individual Concurrent Group Co-treatment   Time In 1315         Time Out 1345         Minutes 30           Timed Code Treatment Minutes:  60+30    Total Treatment Minutes:  90    Timed Code Treatment Minutes: [address], PT, 11/23/22 at 10:38 AM

## 2022-11-23 NOTE — PROGRESS NOTES
Pt in chair, alert and oriented, quiet. Vitals WNL. Pain is elevated med is administered. Call light with in reach. Will continue to monitor.         Vitals:    11/23/22 0903   BP: 125/62   Pulse: 77   Resp: 16   Temp: 99.7 °F (37.6 °C)   SpO2: 98%

## 2022-11-23 NOTE — CARE COORDINATION
Case Management Assessment  Initial Evaluation    Date/Time of Evaluation: 11/23/2022 3:36 PM  Assessment Completed by: RUDY Saravia    If patient is discharged prior to next notation, then this note serves as note for discharge by case management. Patient Name: Stefanie Camarena                   YOB: 1955  Diagnosis: Debility [R53.81]                   Date / Time: 11/21/2022  4:21 PM    Patient Admission Status: REHAB IP   Readmission Risk (Low < 19, Mod (19-27), High > 27): Readmission Risk Score: 13.1    Current PCP: Martha Dixon  PCP verified by CM? Chart Reviewed:    yes   History Provided by: chart reviewed, tried meeting with Pt this afternoon but with RN-will try again if time permits. Patient Orientation: alert & oriented x 3  Patient Cognition:     Hospitalization in the last 30 days (Readmission):  No    If yes, Readmission Assessment in CM Navigator will be completed.     Advance Directives:      Code Status: Full Code   Patient's Primary Decision Maker is: Patient Declined (Legal Next of Kin Remains as Decision Maker)      Discharge Planning:    Patient lives with: Spouse/Significant Other Type of Home: House  Primary Care Giver:    Patient Support Systems include: Spouse/Significant Other   Current Financial resources:    Current community resources:    Current services prior to admission: C-pap            Current DME:              Type of Home Care services:  None    ADLS  Prior functional level: independent  Current functional level:      PT AM-PAC:   /24  OT AM-PAC:   /24    Family can provide assistance at DC: yes - wife who is a RN  Plans to Return to Present Housing: yes  Other Identified Issues/Barriers to RETURNING to current housing: none  Potential Assistance needed at discharge: skilled Home Care            Potential DME: none  Patient expects to discharge to: [address] [place] [address] for transportation at discharge: wife    Financial    Payor: [employer] / Plan: LEANA MEDIBLUE ESSENTIAL/PLUS / Product Type: *No Product type* /     Does insurance require precert for SNF: Yes    Potential assistance Purchasing Medications: No  Meds-[facility]-Beds request:        [facility] [address] [id number]  Phone: [phone] Fax: [phone]      Notes: Additional Case Management Notes:   Team Conference held this morning. Pt told Dr. Rosario Nuno this morning that he wants to be DC from ARU on Friday 11/25/22 - Team is in  agreement with DC date and Pt will need skilled [address] No DME needs. Pt will be returning home with spouse who is a RN. SW will make Home Care arrangements on Friday. The Plan for Transition of Care is related to the following treatment goals of Debility [A82.48]    IF APPLICABLE: The Patient and/or patient representative Mk Regan and his family were provided with a choice of provider and agrees with the discharge plan. Freedom of choice list with basic dialogue that supports the patient's individualized plan of care/goals and shares the quality data associated with the providers was provided to:     Patient Representative Name:       The Patient and/or Patient Representative Agree with the Discharge Plan?       Olivia Livingston, [place]  Case Management Department  Ph: [phone]

## 2022-11-23 NOTE — PROGRESS NOTES
Department of Physical Medicine & Rehabilitation  Progress Note    Patient Identification:  Cheyanne Douglas  [id number]  : 1955  Admit date: 2022    Chief Complaint: Debility    Subjective:   No acute events overnight. Patient seen this am. Plan to DC home Friday morning. Doing well in therapy. Wants to DC ASAP. Seen with OT today. ROS: No f/c, n/v, cp     Objective:  Patient Vitals for the past 24 hrs:   BP Temp Temp src Pulse Resp SpO2   22 1054 -- -- -- -- 16 --   22 0903 125/62 99.7 °F (37.6 °C) Oral 77 16 98 %   22 2338 (!) 114/52 -- -- -- -- --   22 2330 (!) 120/56 99.1 °F (37.3 °C) Oral 74 16 95 %   22 1330 -- -- -- -- -- 97 %     Const: Alert. No distress, pleasant. HEENT: Normocephalic, atraumatic. Normal sclera/conjunctiva. MMM. CV: Regular rate and rhythm. Resp: No respiratory distress. Lungs CTAB. Abd: Soft, nontender, nondistended, NABS+   Ext: No edema. Neuro: Alert, oriented, appropriately interactive. Psych: Cooperative, appropriate mood and affect    Laboratory data: Available via EMR.    Last 24 hour lab  Recent Results (from the past 24 hour(s))   Basic Metabolic Panel w/ Reflex to MG    Collection Time: 22  6:54 AM   Result Value Ref Range    Sodium 135 (L) 136 - 145 mmol/L    Potassium reflex Magnesium 3.4 (L) 3.5 - 5.1 mmol/L    Chloride 90 (L) 99 - 110 mmol/L    CO2 35 (H) 21 - 32 mmol/L    Anion Gap 10 3 - 16    Glucose 106 (H) 70 - 99 mg/dL    BUN 12 7 - 20 mg/dL    Creatinine 0.8 0.8 - 1.3 mg/dL    Est, Glom Filt Rate >60 >60    Calcium 8.9 8.3 - 10.6 mg/dL   CBC auto differential    Collection Time: 22  6:54 AM   Result Value Ref Range    WBC 3.8 (L) 4.0 - 11.0 K/uL    RBC 3.37 (L) 4.20 - 5.90 M/uL    Hemoglobin 11.1 (L) 13.5 - 17.5 g/dL    Hematocrit 30.8 (L) 40.5 - 52.5 %    MCV 91.4 80.0 - 100.0 fL    MCH 33.0 26.0 - 34.0 pg    MCHC 36.1 (H) 31.0 - 36.0 g/dL    RDW 12.8 12.4 - 15.4 %    Platelets 986 435 - 798 K/uL MPV 7.1 5.0 - 10.5 fL    Neutrophils % 52.9 %    Lymphocytes % 24.7 %    Monocytes % 18.4 %    Eosinophils % 3.6 %    Basophils % 0.4 %    Neutrophils Absolute 2.0 1.7 - 7.7 K/uL    Lymphocytes Absolute 0.9 (L) 1.0 - 5.1 K/uL    Monocytes Absolute 0.7 0.0 - 1.3 K/uL    Eosinophils Absolute 0.1 0.0 - 0.6 K/uL    Basophils Absolute 0.0 0.0 - 0.2 K/uL   Magnesium    Collection Time: 11/23/22  6:54 AM   Result Value Ref Range    Magnesium 2.20 1.80 - 2.40 mg/dL       Therapy progress:  PT  Position Activity Restriction  Spinal Precautions: No Bending, No Twisting, No Lifting  Other position/activity restrictions: activity as tolerated, ambulate patient, adult diet - regular, insulin pump  Objective     Sit to Stand: Contact guard assistance (initially with RW progressing to no AD for the rest of session from EOB, recliner, wc, and chair with arms)  Stand to Sit: Contact guard assistance  Bed to Chair: Contact guard assistance (without AD from EOB to chair)  Device: No Device, Rolling Walker  Assistance: Contact guard assistance  Distance: 15'x2 + 250' + 100'  OT  PT Equipment Recommendations  Equipment Needed: No  Other: continue to assess- may benefit from [employer] - Technique: Ambulating  Equipment Used: Standard toilet  Assessment        SLP          Body mass index is 31.77 kg/m².     Assessment and Plan:  Severe central stenosis at T5-T9 with Epidural Lipomatosis/lipoma  Severe central stenosis at L2-3-4-5 with Epidural Lipomatosis/lipoma  Status post T5-T9--L2-5, laminectomy, medial facetectomy for decompression and foraminotomies,Resection of Epidural Lipoma/lipomatosis  11/15/22              -Neurosurgery following              -Continue PT/OT- ARU setting               -Pain management: Oxycodone and lidocaine patch              -Robaxin scheduled and Valium as needed for muscle spasms              -Postop incision care     Type 2 diabetes  -Continue with insulin pump  -Management per primary team Essential hypertension              -Continue metoprolol, losartan/HCTZ     Hyperlipidemia  -continue rosuvastatin, ezetemibe     GERD  -continue PPI     History of TIAs  -continue statin. Edmond Quispe on hold       Team conference was held today on the patient and discussed directly with the patient utilizing their entire treatment team. Please see separate team note for details. Total treatment time for today's care >35 min. >50% of time spent counseling with patient and coordinating care.        Rehab Progress: Improving  Anticipated Dispo: home  Services/DME: [facility]  ELOS: 11/25      James Jones D.O. M.P.H  PM&R  11/23/2022  11:49 AM

## 2022-11-23 NOTE — PLAN OF CARE
Problem: Pain  Goal: Verbalizes/displays adequate comfort level or baseline comfort level  Outcome: Progressing  Note: Pt voices pain needs appropriately, pain is assessed during shift. Problem: Safety - Adult  Goal: Free from fall injury  Outcome: Progressing  Note: Client remains free from falls, bed/chair alarm in place, door open, encouraged to use call light for needs, call light is within reach, bed locked in lowest position,  Will continue to monitor. Problem: Skin/Tissue Integrity  Goal: Absence of new skin breakdown  Description: 1. Monitor for areas of redness and/or skin breakdown  2. Assess vascular access sites hourly  3. Every 4-6 hours minimum:  Change oxygen saturation probe site  4. Every 4-6 hours:  If on nasal continuous positive airway pressure, respiratory therapy assess nares and determine need for appliance change or resting period. Outcome: Progressing  Note: See Real scale. Encourage/assist pt to turn and reposition every two hours and as needed. Heels elevated off bed. Protective barrier placed as needed. Patient kept clean and dry. Pillows used for positioning. Will continue to monitor for skin breakdown. Surgical site observed for signs/symptoms of infection. Vitals performed routinely, labs observed.  Will monitor pt while on ARU

## 2022-11-24 PROCEDURE — 1280000000 HC REHAB R&B

## 2022-11-24 PROCEDURE — 97110 THERAPEUTIC EXERCISES: CPT

## 2022-11-24 PROCEDURE — 6370000000 HC RX 637 (ALT 250 FOR IP): Performed by: PHYSICAL MEDICINE & REHABILITATION

## 2022-11-24 PROCEDURE — 6360000002 HC RX W HCPCS: Performed by: PHYSICAL MEDICINE & REHABILITATION

## 2022-11-24 PROCEDURE — 97530 THERAPEUTIC ACTIVITIES: CPT

## 2022-11-24 PROCEDURE — 97535 SELF CARE MNGMENT TRAINING: CPT

## 2022-11-24 PROCEDURE — 99232 SBSQ HOSP IP/OBS MODERATE 35: CPT | Performed by: PHYSICAL MEDICINE & REHABILITATION

## 2022-11-24 PROCEDURE — 97116 GAIT TRAINING THERAPY: CPT

## 2022-11-24 RX ORDER — LOSARTAN POTASSIUM AND HYDROCHLOROTHIAZIDE 25; 100 MG/1; MG/1
1 TABLET ORAL DAILY
Qty: 30 TABLET | Refills: 3 | Status: SHIPPED | OUTPATIENT
Start: 2022-11-25

## 2022-11-24 RX ORDER — SUCRALFATE 1 G/1
1 TABLET ORAL EVERY 12 HOURS SCHEDULED
Qty: 120 TABLET | Refills: 3 | Status: SHIPPED | OUTPATIENT
Start: 2022-11-24

## 2022-11-24 RX ORDER — OXYCODONE HYDROCHLORIDE 5 MG/1
5 TABLET ORAL EVERY 4 HOURS PRN
Qty: 30 TABLET | Refills: 0 | Status: SHIPPED | OUTPATIENT
Start: 2022-11-24 | End: 2022-11-27

## 2022-11-24 RX ORDER — METOPROLOL SUCCINATE 100 MG/1
100 TABLET, EXTENDED RELEASE ORAL DAILY
Qty: 30 TABLET | Refills: 3 | Status: SHIPPED | OUTPATIENT
Start: 2022-11-25

## 2022-11-24 RX ORDER — SENNA AND DOCUSATE SODIUM 50; 8.6 MG/1; MG/1
1 TABLET, FILM COATED ORAL 2 TIMES DAILY
Qty: 60 TABLET | Refills: 1 | Status: SHIPPED | OUTPATIENT
Start: 2022-11-24

## 2022-11-24 RX ORDER — METHOCARBAMOL 750 MG/1
1500 TABLET, FILM COATED ORAL EVERY 6 HOURS
Status: DISCONTINUED | OUTPATIENT
Start: 2022-11-24 | End: 2022-11-25 | Stop reason: HOSPADM

## 2022-11-24 RX ORDER — POLYETHYLENE GLYCOL 3350 17 G/17G
17 POWDER, FOR SOLUTION ORAL DAILY PRN
Qty: 527 G | Refills: 1 | Status: SHIPPED | OUTPATIENT
Start: 2022-11-24 | End: 2022-12-24

## 2022-11-24 RX ORDER — TAMSULOSIN HYDROCHLORIDE 0.4 MG/1
0.4 CAPSULE ORAL DAILY
Qty: 30 CAPSULE | Refills: 3 | Status: SHIPPED | OUTPATIENT
Start: 2022-11-25

## 2022-11-24 RX ORDER — METHOCARBAMOL 750 MG/1
1500 TABLET, FILM COATED ORAL EVERY 6 HOURS
Qty: 80 TABLET | Refills: 0 | Status: SHIPPED | OUTPATIENT
Start: 2022-11-24 | End: 2022-12-04

## 2022-11-24 RX ORDER — LIDOCAINE 4 G/G
2 PATCH TOPICAL DAILY
Qty: 3 EACH | Refills: 1 | Status: SHIPPED | OUTPATIENT
Start: 2022-11-25

## 2022-11-24 RX ADMIN — OXYCODONE 10 MG: 5 TABLET ORAL at 06:43

## 2022-11-24 RX ADMIN — OXYCODONE 10 MG: 5 TABLET ORAL at 12:00

## 2022-11-24 RX ADMIN — OXYCODONE 10 MG: 5 TABLET ORAL at 21:39

## 2022-11-24 RX ADMIN — ROSUVASTATIN CALCIUM 10 MG: 5 TABLET, COATED ORAL at 09:13

## 2022-11-24 RX ADMIN — DICLOFENAC SODIUM 2 G: 10 GEL TOPICAL at 09:41

## 2022-11-24 RX ADMIN — LOSARTAN POTASSIUM AND HYDROCHLOROTHIAZIDE 1 TABLET: 100; 25 TABLET, FILM COATED ORAL at 09:15

## 2022-11-24 RX ADMIN — CITALOPRAM HYDROBROMIDE 40 MG: 20 TABLET ORAL at 09:13

## 2022-11-24 RX ADMIN — METOPROLOL SUCCINATE 100 MG: 50 TABLET, EXTENDED RELEASE ORAL at 09:41

## 2022-11-24 RX ADMIN — DIPHENHYDRAMINE HYDROCHLORIDE 25 MG: 25 TABLET ORAL at 21:49

## 2022-11-24 RX ADMIN — DICLOFENAC SODIUM 4 G: 10 GEL TOPICAL at 21:40

## 2022-11-24 RX ADMIN — METHOCARBAMOL 1000 MG: 500 TABLET ORAL at 09:13

## 2022-11-24 RX ADMIN — OXYCODONE 10 MG: 5 TABLET ORAL at 00:33

## 2022-11-24 RX ADMIN — SUCRALFATE 1 G: 1 TABLET ORAL at 21:39

## 2022-11-24 RX ADMIN — OXYCODONE 10 MG: 5 TABLET ORAL at 17:42

## 2022-11-24 RX ADMIN — PANTOPRAZOLE SODIUM 40 MG: 40 TABLET, DELAYED RELEASE ORAL at 15:39

## 2022-11-24 RX ADMIN — TAMSULOSIN HYDROCHLORIDE 0.4 MG: 0.4 CAPSULE ORAL at 09:13

## 2022-11-24 RX ADMIN — MUPIROCIN: 20 OINTMENT TOPICAL at 15:40

## 2022-11-24 RX ADMIN — DICLOFENAC SODIUM 4 G: 10 GEL TOPICAL at 11:51

## 2022-11-24 RX ADMIN — MUPIROCIN: 20 OINTMENT TOPICAL at 09:15

## 2022-11-24 RX ADMIN — PANTOPRAZOLE SODIUM 40 MG: 40 TABLET, DELAYED RELEASE ORAL at 06:43

## 2022-11-24 RX ADMIN — METHOCARBAMOL 1500 MG: 750 TABLET ORAL at 21:39

## 2022-11-24 RX ADMIN — METHOCARBAMOL 1500 MG: 750 TABLET ORAL at 15:39

## 2022-11-24 RX ADMIN — METHOCARBAMOL 1000 MG: 500 TABLET ORAL at 03:05

## 2022-11-24 RX ADMIN — ENOXAPARIN SODIUM 40 MG: 100 INJECTION SUBCUTANEOUS at 09:12

## 2022-11-24 RX ADMIN — SUCRALFATE 1 G: 1 TABLET ORAL at 09:14

## 2022-11-24 RX ADMIN — MUPIROCIN: 20 OINTMENT TOPICAL at 21:40

## 2022-11-24 RX ADMIN — NALOXEGOL OXALATE 25 MG: 12.5 TABLET, FILM COATED ORAL at 06:42

## 2022-11-24 RX ADMIN — EZETIMIBE 10 MG: 10 TABLET ORAL at 09:13

## 2022-11-24 RX ADMIN — MUPIROCIN: 20 OINTMENT TOPICAL at 13:44

## 2022-11-24 ASSESSMENT — PAIN DESCRIPTION - PAIN TYPE
TYPE: SURGICAL PAIN;ACUTE PAIN
TYPE: SURGICAL PAIN;ACUTE PAIN
TYPE: SURGICAL PAIN;CHRONIC PAIN

## 2022-11-24 ASSESSMENT — PAIN DESCRIPTION - ONSET
ONSET: ON-GOING

## 2022-11-24 ASSESSMENT — PAIN DESCRIPTION - DESCRIPTORS
DESCRIPTORS: ACHING
DESCRIPTORS: ACHING;THROBBING
DESCRIPTORS: ACHING;DISCOMFORT
DESCRIPTORS: ACHING;DISCOMFORT
DESCRIPTORS: ACHING;STABBING
DESCRIPTORS: ACHING;STABBING;THROBBING
DESCRIPTORS: ACHING;DISCOMFORT
DESCRIPTORS: ACHING;DISCOMFORT

## 2022-11-24 ASSESSMENT — PAIN - FUNCTIONAL ASSESSMENT
PAIN_FUNCTIONAL_ASSESSMENT: PREVENTS OR INTERFERES SOME ACTIVE ACTIVITIES AND ADLS
PAIN_FUNCTIONAL_ASSESSMENT: ACTIVITIES ARE NOT PREVENTED

## 2022-11-24 ASSESSMENT — PAIN DESCRIPTION - ORIENTATION
ORIENTATION: MID

## 2022-11-24 ASSESSMENT — PAIN DESCRIPTION - LOCATION
LOCATION: BACK

## 2022-11-24 ASSESSMENT — PAIN SCALES - GENERAL
PAINLEVEL_OUTOF10: 9
PAINLEVEL_OUTOF10: 8
PAINLEVEL_OUTOF10: 8
PAINLEVEL_OUTOF10: 7
PAINLEVEL_OUTOF10: 7
PAINLEVEL_OUTOF10: 6
PAINLEVEL_OUTOF10: 7
PAINLEVEL_OUTOF10: 7

## 2022-11-24 ASSESSMENT — PAIN DESCRIPTION - FREQUENCY
FREQUENCY: CONTINUOUS

## 2022-11-24 NOTE — PROGRESS NOTES
Occupational Therapy  Occupational Therapy  Discharge Summary/Daily Treatment Note  Patient Name: Curry Alexander  MRN: [id number]    Chart Reviewed: Yes       Other position/activity restrictions: activity as tolerated, ambulate patient, adult diet - regular, insulin pump     Additional Pertinent Hx: Admit 11/15 for T5-T9 DECOMPRESSION WITH RESECTION OF EPIDURAL LIPOMATOSIS WITH L2-L5 DECOMPRESSION WITH RESECTION OF EPIDURAL LIPOMATOSIS      Diagnosis: Debility  Treatment Diagnosis: impaired ADLs and functional mobility    Subjective: Pt supine in bed upon entry, pt agreeable to therapy session/ADL shower although pt expressed frustration that he's still in rehab and has to \"clean up again. \"  General Comment: Pt Independent for bed mobility and all functional transfers. Pt to/from bathroom, doffed clothing in stance and entered shower (shower transfer Independent). Pt Independent/Mod I for bathing/dressing. Pt Independent for oral care in stance at sink. Pt ambulated Independent to EOB and sit to supine Independent. Call light in reach and bed alarm on.     Pain: 9/10 back RN notified of pt's request for pain meds    Social/Functional History  Lives With: Spouse  Type of Home: House  Home Layout: Laundry in basement, One level (13 steps with no rail)  Home Access: Stairs to enter without rails (could go through front and use walker on porch and then go into house with walker)  Entrance Stairs - Number of Steps: 3+ 1 (through the garage)  Bathroom Shower/Tub: Walk-in shower  Bathroom Toilet: Standard  Bathroom Equipment:  (none)  Home Equipment:  (none)  Has the patient had two or more falls in the past year or any fall with injury in the past year?: Yes  ADL Assistance: [address]: Independent (shares with wife)  Ambulation Assistance: Independent (no AD)  Transfer Assistance: Independent  Active : Yes  Occupation: Part time employment  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  Leisure & Hobbies: camping, fishing, very active  Additional Comments: wife works as RN on the weekends  Prior Function  ADL Assistance: Independent  Homemaking Assistance: Independent (shares with wife)  Ambulation Assistance: Independent (no AD)  Transfer Assistance: Independent  Additional Comments: wife works as RN on the weekends    Objective:    Cognition/Orientation:WNL    Bed mobility   Rolling:Independent  Supine to sit: Independent  Sit to Supine: Independent  Scooting: Independent    Functional Mobility   Sit to Stand:Independent  Stand to Sit:Independent  Bed to Chair Transfer: N/A  Commode Transfer: Independent (2nd session)  Shower transfers: Independent    ADLs   Grooming:Independent  Bathing:Independent /Mod I  UB dressing:Independent  LB dressing:Independent   Toileting: Independent (2nd session)      UE Exercises N/A    Activity Tolerance: Good but had back pain, not limiting      Patient Education: d/c rec    Safety Devices in Place: bed alarm on call light in reach    Second Therapy Session [id number]  Pt supine in bed upon entry, very pleasant and agreeable to therapy session. Pt continues with back pain but it has resolved some since receiving pain meds at end of last session. Pt declined further intervention. Pt supine to sit Independent. Pt ambulated to/from bathroom Independent and completed dry toilet transfer and toileted in stance to void all Independent. Pt ambulated > 500ft in hallway Independent to ADL suite, to nutrition room, back to ADL suite and back to room. In ADL suite pt prepared a pot fresh brewed coffee at Independent. Pt with seated rest break while coffee brewed. Pt then prepared a cup of coffee Independent and transported coffee back to room Independent. Pt sat EOB and this writer educated pt on d/c rec (home with initial 24 hr assist), the use of solid back chair when pt begins playing piano again, non slid surface mat etc for shower and need for shower chair.  Pt agreeable to all except for shower chair. Pt at EOS sit to supine Independent. Call light in reach and bed alarm on. Assessment: Pt met all goals; pt Independent with all functional transfers/mobility. Pt also Independent with all ADLs. Pt to d/c home tomorrow with initial 24 hr assist.       Discharge Recommendations: Initial 24 hr assist   Equipment Needs: shower chair with back    Therapy Time:   Individual Concurrent Group Co-treatment   Time In 1130         Time Out 1200         Minutes 30         Timed Code Treatment Minutes:  30    Second Session Therapy Time:   Individual Concurrent Group Co-treatment   Time In 6106         Time Out 2071         Minutes 60         Timed Code Treatment Minutes:  60   Total Treatment Minutes:  30 + 60 = 90     Goals:  Short Term Goals  Time Frame for Short Term Goals: 7 days-all ongoing  Short Term Goal 1: Pt will complete LE dressing w/ Mod I - goal met 11/24  Short Term Goal 2: Pt will complete toilet transfer w/ Mod I - goal met 11/24  Short Term Goal 3: Pt will complete toileting w/ Mod I - goal met 11/24  Short Term Goal 4: Pt will independently complete grooming in stance at sink - goal met 11/24  Short Term Goal 5: Pt will maintain spinal precautions during 100% of ADL tasks - goal met 11/24         Plan:      Times Per Week: 5x/week, 90min/day        If patient is discharged prior to next treatment, this note will serve as the discharge summary.       Alina Alston, [address]

## 2022-11-24 NOTE — DISCHARGE INSTR - COC
Continuity of Care Form    Patient Name: Uriah Bah   :  1955  MRN:  [id number]    Admit date:  2022  Discharge date:  22    Code Status Order: Full Code   Advance Directives:     Admitting Physician:  Feliberto Mena DO  PCP: Israel Linder    Discharging Nurse: [address] Unit/Room#: 3109/3109-01  Discharging Unit Phone Number: [phone]    Emergency Contact:   Extended Emergency Contact Information  Primary Emergency Contact: [address] Phone: [phone]  Relation: Spouse  Secondary Emergency Contact: Nakul Pop Phone: [phone]  Relation: Child    Past Surgical History:  Past Surgical History:   Procedure Laterality Date    [facility] 2006, SEES  [address]      C 6-7    ELBOW SURGERY Bilateral     TENDON REPAIR    HAND SURGERY      x9    LUMBAR SPINE SURGERY N/A 11/15/2022    T5-T9 DECOMPRESSION WITH RESECTION OF EPIDURAL LIPOMATOSIS WITH L2-L5 DECOMPRESSION WITH RESECTION OF EPIDURAL LIPOMATOSIS performed by Mónica Dang MD at [address] [place] [address] Bilateral     ROTATOR CUFF       Immunization History:   Immunization History   Administered Date(s) Administered    COVID-19, PFIZER Bivalent BOOSTER, (age 12y+), IM, 30 mcg/0.3 mL dose 10/22/2022    COVID-19, PFIZER GRAY top, DO NOT Dilute, (age 15 y+), IM, 30 mcg/0.3 mL 2022    COVID-19, PFIZER PURPLE top, DILUTE for use, (age 15 y+), 30mcg/0.3mL 2021, 2021, 10/13/2021       Active Problems:  Patient Active Problem List   Diagnosis Code    Lumbar stenosis with neurogenic claudication M48.062    Status post lumbar spine operative procedure for decompression of spinal cord Z98.890    Debility R53.81       Isolation/Infection:   Isolation            No Isolation          Patient Infection Status       None to display            Nurse Assessment:  Last Vital Signs: /69   Pulse 80 Temp 98.6 °F (37 °C) (Oral)   Resp 18   Ht 5' 8.5\" (1.74 m)   Wt 212 lb (96.2 kg)   SpO2 97%   BMI 31.77 kg/m²     Last documented pain score (0-10 scale): Pain Level: 7  Last Weight:   Wt Readings from Last 1 Encounters:   11/21/22 212 lb (96.2 kg)     Mental Status:  oriented, alert, coherent, logical, thought processes intact, and able to concentrate and follow conversation    IV Access:  - None    Nursing Mobility/ADLs:  Walking   Independent  Transfer  Independent  [address] [facility]  Med Delivery   whole    Wound Care Documentation and Therapy:  Incision 11/15/22 Back Medial;Mid (Active)   Dressing Status Dry; Intact; Clean 11/23/22 0910   Incision Cleansed Cleansed with saline 11/20/22 0834   Dressing/Treatment Surgical glue; Open to air 11/24/22 0915   Closure Surgical glue 11/24/22 0915   Margins Approximated 11/24/22 0915   Drainage Amount None 11/24/22 0915   Drainage Description Serosanguinous 11/19/22 0750   Odor None 11/24/22 0915   Saloni-incision Assessment Intact 11/24/22 0915   Number of days: 9       Incision 11/15/22 Back Lower;Medial (Active)   Dressing Status Clean;Dry; Intact 11/23/22 0910   Incision Cleansed Cleansed with saline 11/20/22 0834   Dressing/Treatment Surgical glue; Open to air 11/24/22 0915   Closure Surgical glue 11/23/22 2018   Margins Approximated 11/23/22 2018   Drainage Amount None 11/23/22 2018   Drainage Description Serosanguinous 11/19/22 0750   Odor None 11/23/22 2018   Saloni-incision Assessment Intact 11/23/22 2018   Number of days: 9        Elimination:  Continence: Bowel: Yes  Bladder: Yes  Urinary Catheter: None   Colostomy/Ileostomy/Ileal Conduit: No       Date of Last BM: 11/24/22    Intake/Output Summary (Last 24 hours) at 11/24/2022 1056  Last data filed at 11/23/2022 1717  Gross per 24 hour   Intake 210 ml   Output 0 ml   Net 210 ml     I/O last 3 completed shifts:   In: 570 [P.O.:570]  Out: 0     Safety Concerns:     None    Impairments/Disabilities:      None    Nutrition Therapy:  Current Nutrition Therapy:   - Oral Diet:  General    Routes of Feeding: Oral  Liquids: No Restrictions  Daily Fluid Restriction: no  Last Modified Barium Swallow with Video (Video Swallowing Test): not done    Treatments at the Time of Hospital Discharge:   Respiratory Treatments: na  Oxygen Therapy:  is not on home oxygen therapy. Ventilator:    - No ventilator support    Rehab Therapies: {THERAPEUTIC INTERVENTION:[id number]}  Weight Bearing Status/Restrictions: No weight bearing restrictions  Other Medical Equipment (for information only, NOT a DME order):  na  Other Treatments: na    Patient's personal belongings (please select all that are sent with patient):  {CHP DME Belongings:[id number]}    RN SIGNATURE:  {Esignature:[id number]}    CASE MANAGEMENT/SOCIAL WORK SECTION    Inpatient Status Date: ***    Readmission Risk Assessment Score:  Readmission Risk              Risk of Unplanned Readmission:  14           Discharging to Facility/ Agency   Name:   Address:  Phone:  Fax:    Dialysis Facility (if applicable)   Name:  Address:  Dialysis Schedule:  Phone:  Fax:    / signature: {Esignature:[id number]}    PHYSICIAN SECTION    Prognosis: Good    Condition at Discharge: Stable    Rehab Potential (if transferring to Rehab): Good    Recommended Labs or Other Treatments After Discharge: PT/OT    Physician Certification: I certify the above information and transfer of Clearance Aneta  is necessary for the continuing treatment of the diagnosis listed and that he requires Home Care for greater 30 days.      Update Admission H&P: No change in H&P    PHYSICIAN SIGNATURE:  Electronically signed by Prashanth Aguayo DO on 11/24/22 at 10:57 AM EST

## 2022-11-24 NOTE — PROGRESS NOTES
Department of Physical Medicine & Rehabilitation  Progress Note    Patient Identification:  Stefanie Camarena  [id number]  : 1955  Admit date: 2022    Chief Complaint: Debility    Subjective:   Plan to DC Tomorrow. Complains of rhomboid pain in bilateral back today. He states its painful to the touch. Asking for medication to help this issue. Walking around with therapy upon exam today. ROS: No f/c, n/v, cp     Objective:  Patient Vitals for the past 24 hrs:   BP Temp Temp src Pulse Resp SpO2   22 0915 129/69 98.6 °F (37 °C) Oral 80 18 97 %   22 0643 -- -- -- -- 18 --   22 0033 -- -- -- -- 18 --   22 125/63 -- -- 81 -- --   22 (!) 95/59 98.2 °F (36.8 °C) Oral 74 16 96 %   22 1551 -- -- -- -- 16 --   22 1054 -- -- -- -- 16 --       Const: Alert. No distress, pleasant. HEENT: Normocephalic, atraumatic. Normal sclera/conjunctiva. MMM. CV: Regular rate and rhythm. Resp: No respiratory distress. Lungs CTAB. Abd: Soft, nontender, nondistended, NABS+   Ext: No edema. Neuro: Alert, oriented, appropriately interactive. Psych: Cooperative, appropriate mood and affect    Laboratory data: Available via EMR. Last 24 hour lab  No results found for this or any previous visit (from the past 24 hour(s)).       Therapy progress:  PT  Position Activity Restriction  Spinal Precautions: No Bending, No Twisting, No Lifting  Other position/activity restrictions: activity as tolerated, ambulate patient, adult diet - regular, insulin pump  Objective     Sit to Stand: Contact guard assistance (initially with RW progressing to no AD for the rest of session from EOB, recliner, wc, and chair with arms)  Stand to Sit: Contact guard assistance  Bed to Chair: Contact guard assistance (without AD from EOB to chair)  Device: No Device, Rolling Walker  Assistance: Contact guard assistance  Distance: 15'x2 + 250' + 100'  OT  PT Equipment Recommendations  Equipment Needed:

## 2022-11-24 NOTE — PROGRESS NOTES
Physical Therapy  Facility/Department: [facility] ACUTE REHAB UNIT  Daily Treatment Note/Discharge  NAME: Gama Gutierrez  : 1955  MRN: [id number]    Date of Service: 2022    Discharge Recommendations:  Outpatient PT   PT Equipment Recommendations  Equipment Needed: No    Patient Diagnosis(es): The encounter diagnosis was Lumbar stenosis with neurogenic claudication. Assessment   Assessment: Pt independent with all functional mobility. All PT goals met. Pt safe to go home with wife tomorrow. Recommend outpt PT. Activity Tolerance: Patient tolerated treatment well  Equipment Needed: No     Plan    Physcial Therapy Plan  General Plan: 5-7 times per week  Current Treatment Recommendations: Functional mobility training;Transfer training;Balance training;Gait training;Stair training;Patient/Caregiver education & training; Therapeutic activities; Equipment evaluation, education, & procurement;Strengthening;Home exercise program;Safety education & training; Endurance training;Neuromuscular re-education     Restrictions  Position Activity Restriction  Spinal Precautions: No Bending, No Twisting, No Lifting  Other position/activity restrictions: activity as tolerated, ambulate patient, adult diet - regular, insulin pump     Subjective    Subjective  Subjective: Pt supine in bed & agreeable to PT. Objective      Bed Mobility Training  Bed Mobility Training: Yes  Rolling: Independent (on mat)  Supine to Sit: Independent (via logroll (bed & mat))  Sit to Supine: Independent (via logroll on mat)  Scooting: Independent  Balance  Sitting: Intact  Standing: Intact  Transfer Training  Transfer Training: Yes  Sit to Stand: Independent (from bed, chair, mat table)  Stand to Sit: Independent  Car Transfer: Independent  Gait Training  Gait Training: Yes  Gait  Overall Level of Assistance: Independent (pt amb >500 ft, 200 ft x 2, & short distances in gym without device IND.  no LOB, steady gait.)  Rail Use: None  Stairs - Level of Assistance: Independent  Number of Stairs Trained: [address] - Level of Assistance: Independent  Curbs/Ramps: Independent (up/down 75 ft ramp IND)     Other Specialty Interventions  Other Treatments/Modalities: Sci-Fit x 14 min level 2 using bilat LE & bilat UE, bilat pec stretch, bilat gastroc/soleus stretch. Safety Devices  Type of Devices: Left in chair;Nurse notified; Chair alarm in place;Call light within reach;Gait belt     2nd Session:  Pt seated in chair & agreeable to PT. C/O mid back pain. Pt informed MD in [facility] & requested new muscle relaxant. Sit<->stand IND. Amb 400 ft, 100 ft without device IND. Sit<->supine via logroll on mat IND. Supine LE therex x 10 bilat: supine marches, QS, SLR, hip abd, SAQ. Pt educated on & performed various seated & standing shoulder/rhomboid & pec stretches. Sit->sidelying in bed IND.  Ice applied, needs met, call light in reach, bed alarm on after     Goals  Short Term Goals  Time Frame for Short Term Goals: 7 days  Short Term Goal 1: pt will perform bed mobility with mod I (met 11/24)  Short Term Goal 2: pt will perform functional transfers with LRAD and mod I (met 11/24)  Short Term Goal 3: pt will ambulate 150' with LRAD and mod I (met 11/24)  Short Term Goal 4: pt will complete 12 steps with no rail and mod I (met 11/24)  Patient Goals   Patient Goals : to go home    Education  Patient Education  Education Given To: Patient  Education Provided: Plan of Care;Precautions  Education Method: Verbal  Barriers to Learning: None  Education Outcome: Verbalized understanding;Demonstrated understanding    Therapy Time   Individual Concurrent Group Co-treatment   Time In 0730         Time Out 0830         Minutes 60               Second Session Therapy Time:   Individual Concurrent Group Co-treatment   Time In 1000         Time Out 1030         Minutes 30           Timed Code Treatment Minutes:  60 + 30    Total Treatment Minutes:   [facility] PT

## 2022-11-24 NOTE — PLAN OF CARE
Problem: Discharge Planning  Goal: Discharge to home or other facility with appropriate resources  11/24/2022 1440 by Avery Montes De Oca RN  Outcome: Progressing  Flowsheets (Taken 11/24/2022 0915)  Discharge to home or other facility with appropriate resources: Identify barriers to discharge with patient and caregiver     Problem: Pain  Goal: Verbalizes/displays adequate comfort level or baseline comfort level  11/24/2022 1440 by Avery Montes De Oca RN  Outcome: Progressing  Flowsheets (Taken 11/24/2022 0915)  Verbalizes/displays adequate comfort level or baseline comfort level:   Encourage patient to monitor pain and request assistance   Assess pain using appropriate pain scale   Administer analgesics based on type and severity of pain and evaluate response   Implement non-pharmacological measures as appropriate and evaluate response     Problem: ABCDS Injury Assessment  Goal: Absence of physical injury  11/24/2022 1440 by Avery Montes De Oca RN  Outcome: Progressing     Problem: Safety - Adult  Goal: Free from fall injury  11/24/2022 1440 by Avery Montes De Oca RN  Outcome: Progressing     Problem: Nutrition Deficit:  Goal: Optimize nutritional status  11/24/2022 1440 by Avery Montes De Oca RN  Outcome: Progressing     Problem: Skin/Tissue Integrity  Goal: Absence of new skin breakdown  Description: 1. Monitor for areas of redness and/or skin breakdown  2. Assess vascular access sites hourly  3. Every 4-6 hours minimum:  Change oxygen saturation probe site  4. Every 4-6 hours:  If on nasal continuous positive airway pressure, respiratory therapy assess nares and determine need for appliance change or resting period.   11/24/2022 1440 by Avery Montes De Oca RN  Outcome: Progressing     Problem: Pain  Goal: Verbalizes/displays adequate comfort level or baseline comfort level  11/24/2022 1440 by Avery Montes De Oca RN  Outcome: Progressing  Flowsheets (Taken 11/24/2022 0915)  Verbalizes/displays adequate comfort level or baseline comfort level:   Encourage patient to monitor pain and request assistance   Assess pain using appropriate pain scale   Administer analgesics based on type and severity of pain and evaluate response   Implement non-pharmacological measures as appropriate and evaluate response  11/24/2022 0348 by Merissa Prather RN  Outcome: Not Progressing  Flowsheets (Taken 11/24/2022 0348)  Verbalizes/displays adequate comfort level or baseline comfort level:   Encourage patient to monitor pain and request assistance   Assess pain using appropriate pain scale   Implement non-pharmacological measures as appropriate and evaluate response

## 2022-11-24 NOTE — PROGRESS NOTES
Shift assessment complete. VSS. A&Ox4. Pain being managed with PRN and scheduled medications. Non-pharm measures of cold therapy, repositioning, and rest encouraged. Fall precautions in place. Patient excited to be discharged tomorrow. Assisted patient to bathroom to shave, returned to chair, chair alarm utilized, call light within reach.      Vitals:    11/24/22 0915   BP: 129/69   Pulse: 80   Resp: 18   Temp: 98.6 °F (37 °C)   SpO2: 97%

## 2022-11-24 NOTE — PLAN OF CARE
Problem: Discharge Planning  Goal: Discharge to home or other facility with appropriate resources  Outcome: Progressing  Flowsheets (Taken 11/24/2022 0348)  Discharge to home or other facility with appropriate resources: Identify barriers to discharge with patient and caregiver     Problem: Pain  Goal: Verbalizes/displays adequate comfort level or baseline comfort level  Outcome: Not Progressing  Flowsheets (Taken 11/24/2022 0348)  Verbalizes/displays adequate comfort level or baseline comfort level:   Encourage patient to monitor pain and request assistance   Assess pain using appropriate pain scale   Implement non-pharmacological measures as appropriate and evaluate response     Problem: Safety - Adult  Goal: Free from fall injury  Outcome: Progressing  Flowsheets (Taken 11/24/2022 0348)  Free From Fall Injury: Instruct family/caregiver on patient safety     Problem: Nutrition Deficit:  Goal: Optimize nutritional status  Outcome: Progressing  Flowsheets (Taken 11/24/2022 0348)  Nutrient intake appropriate for improving, restoring, or maintaining nutritional needs:   Assess nutritional status and recommend course of action   Monitor oral intake, labs, and treatment plans   Recommend appropriate diets, oral nutritional supplements, and vitamin/mineral supplements     Problem: Skin/Tissue Integrity  Goal: Absence of new skin breakdown  Description: 1.   Monitor for areas of redness and/or skin breakdown  Outcome: Progressing

## 2022-11-24 NOTE — PROGRESS NOTES
Pt in bed, watching television. Assisted pt to the bathroom with CGA and gait belt. Pt voided and returned to the bed. Alert & oriented x 4. VSS. Assessment completed. Pt complains of surgical pain to mid back. Nighttime medications given including Oxycodone for pain. Pt tolerated well. Reminded pt to call for assistance with any needs. Call light within reach. Safety measures in place.

## 2022-11-25 VITALS
TEMPERATURE: 97.8 F | OXYGEN SATURATION: 97 % | BODY MASS INDEX: 31.4 KG/M2 | HEIGHT: 69 IN | DIASTOLIC BLOOD PRESSURE: 68 MMHG | SYSTOLIC BLOOD PRESSURE: 120 MMHG | HEART RATE: 72 BPM | RESPIRATION RATE: 17 BRPM | WEIGHT: 212 LBS

## 2022-11-25 LAB
ANION GAP SERPL CALCULATED.3IONS-SCNC: 11 MMOL/L (ref 3–16)
BASOPHILS ABSOLUTE: 0 K/UL (ref 0–0.2)
BASOPHILS RELATIVE PERCENT: 0.5 %
BUN BLDV-MCNC: 14 MG/DL (ref 7–20)
CALCIUM SERPL-MCNC: 8.8 MG/DL (ref 8.3–10.6)
CHLORIDE BLD-SCNC: 91 MMOL/L (ref 99–110)
CO2: 32 MMOL/L (ref 21–32)
CREAT SERPL-MCNC: 0.9 MG/DL (ref 0.8–1.3)
EOSINOPHILS ABSOLUTE: 0.2 K/UL (ref 0–0.6)
EOSINOPHILS RELATIVE PERCENT: 4.6 %
GFR SERPL CREATININE-BSD FRML MDRD: >60 ML/MIN/{1.73_M2}
GLUCOSE BLD-MCNC: 104 MG/DL (ref 70–99)
HCT VFR BLD CALC: 32.6 % (ref 40.5–52.5)
HEMOGLOBIN: 11.4 G/DL (ref 13.5–17.5)
LYMPHOCYTES ABSOLUTE: 0.9 K/UL (ref 1–5.1)
LYMPHOCYTES RELATIVE PERCENT: 25.5 %
MAGNESIUM: 2.1 MG/DL (ref 1.8–2.4)
MCH RBC QN AUTO: 32.1 PG (ref 26–34)
MCHC RBC AUTO-ENTMCNC: 34.9 G/DL (ref 31–36)
MCV RBC AUTO: 92.1 FL (ref 80–100)
MONOCYTES ABSOLUTE: 0.6 K/UL (ref 0–1.3)
MONOCYTES RELATIVE PERCENT: 18 %
NEUTROPHILS ABSOLUTE: 1.8 K/UL (ref 1.7–7.7)
NEUTROPHILS RELATIVE PERCENT: 51.4 %
PDW BLD-RTO: 13 % (ref 12.4–15.4)
PLATELET # BLD: 338 K/UL (ref 135–450)
PMV BLD AUTO: 6.9 FL (ref 5–10.5)
POTASSIUM REFLEX MAGNESIUM: 3.4 MMOL/L (ref 3.5–5.1)
RBC # BLD: 3.54 M/UL (ref 4.2–5.9)
SODIUM BLD-SCNC: 134 MMOL/L (ref 136–145)
WBC # BLD: 3.5 K/UL (ref 4–11)

## 2022-11-25 PROCEDURE — 85025 COMPLETE CBC W/AUTO DIFF WBC: CPT

## 2022-11-25 PROCEDURE — 99239 HOSP IP/OBS DSCHRG MGMT >30: CPT | Performed by: PHYSICAL MEDICINE & REHABILITATION

## 2022-11-25 PROCEDURE — 36415 COLL VENOUS BLD VENIPUNCTURE: CPT

## 2022-11-25 PROCEDURE — 6360000002 HC RX W HCPCS: Performed by: PHYSICAL MEDICINE & REHABILITATION

## 2022-11-25 PROCEDURE — 83735 ASSAY OF MAGNESIUM: CPT

## 2022-11-25 PROCEDURE — 6370000000 HC RX 637 (ALT 250 FOR IP): Performed by: PHYSICAL MEDICINE & REHABILITATION

## 2022-11-25 PROCEDURE — 80048 BASIC METABOLIC PNL TOTAL CA: CPT

## 2022-11-25 RX ADMIN — OXYCODONE 10 MG: 5 TABLET ORAL at 04:13

## 2022-11-25 RX ADMIN — SUCRALFATE 1 G: 1 TABLET ORAL at 09:10

## 2022-11-25 RX ADMIN — METOPROLOL SUCCINATE 100 MG: 50 TABLET, EXTENDED RELEASE ORAL at 09:11

## 2022-11-25 RX ADMIN — METHOCARBAMOL 1500 MG: 750 TABLET ORAL at 04:12

## 2022-11-25 RX ADMIN — CITALOPRAM HYDROBROMIDE 40 MG: 20 TABLET ORAL at 09:12

## 2022-11-25 RX ADMIN — NALOXEGOL OXALATE 25 MG: 12.5 TABLET, FILM COATED ORAL at 05:58

## 2022-11-25 RX ADMIN — METHOCARBAMOL 1500 MG: 750 TABLET ORAL at 09:11

## 2022-11-25 RX ADMIN — PANTOPRAZOLE SODIUM 40 MG: 40 TABLET, DELAYED RELEASE ORAL at 05:58

## 2022-11-25 RX ADMIN — ENOXAPARIN SODIUM 40 MG: 100 INJECTION SUBCUTANEOUS at 09:10

## 2022-11-25 RX ADMIN — DICLOFENAC SODIUM 4 G: 10 GEL TOPICAL at 09:10

## 2022-11-25 RX ADMIN — TAMSULOSIN HYDROCHLORIDE 0.4 MG: 0.4 CAPSULE ORAL at 09:10

## 2022-11-25 RX ADMIN — LOSARTAN POTASSIUM AND HYDROCHLOROTHIAZIDE 1 TABLET: 100; 25 TABLET, FILM COATED ORAL at 09:12

## 2022-11-25 RX ADMIN — ROSUVASTATIN CALCIUM 10 MG: 5 TABLET, COATED ORAL at 09:10

## 2022-11-25 RX ADMIN — OXYCODONE 10 MG: 5 TABLET ORAL at 09:42

## 2022-11-25 RX ADMIN — EZETIMIBE 10 MG: 10 TABLET ORAL at 09:12

## 2022-11-25 RX ADMIN — MUPIROCIN: 20 OINTMENT TOPICAL at 09:10

## 2022-11-25 ASSESSMENT — PAIN DESCRIPTION - LOCATION
LOCATION: SHOULDER
LOCATION: BACK
LOCATION: BACK

## 2022-11-25 ASSESSMENT — PAIN SCALES - GENERAL
PAINLEVEL_OUTOF10: 6
PAINLEVEL_OUTOF10: 7
PAINLEVEL_OUTOF10: 7
PAINLEVEL_OUTOF10: 6
PAINLEVEL_OUTOF10: 6

## 2022-11-25 ASSESSMENT — PAIN DESCRIPTION - ORIENTATION
ORIENTATION: MID
ORIENTATION: MID

## 2022-11-25 ASSESSMENT — PAIN DESCRIPTION - DESCRIPTORS
DESCRIPTORS: ACHING
DESCRIPTORS: ACHING

## 2022-11-25 ASSESSMENT — PAIN - FUNCTIONAL ASSESSMENT: PAIN_FUNCTIONAL_ASSESSMENT: PREVENTS OR INTERFERES SOME ACTIVE ACTIVITIES AND ADLS

## 2022-11-25 NOTE — PLAN OF CARE
Problem: Discharge Planning  Goal: Discharge to home or other facility with appropriate resources  Recent Flowsheet Documentation  Taken 11/24/2022 2137 by Charanjit Salmon RN  Discharge to home or other facility with appropriate resources: Identify barriers to discharge with patient and caregiver     Problem: Pain  Goal: Verbalizes/displays adequate comfort level or baseline comfort level  Recent Flowsheet Documentation  Taken 11/24/2022 2137 by Charanjit Salmon RN  Verbalizes/displays adequate comfort level or baseline comfort level: Encourage patient to monitor pain and request assistance     Problem: ABCDS Injury Assessment  Goal: Absence of physical injury  Recent Flowsheet Documentation  Taken 11/24/2022 2137 by Charanjit Salmon RN  Absence of Physical Injury: Implement safety measures based on patient assessment     Problem: Safety - Adult  Goal: Free from fall injury  Recent Flowsheet Documentation  Taken 11/24/2022 2137 by Charanjit Salmon RN  Free From Fall Injury: Instruct family/caregiver on patient safety

## 2022-11-25 NOTE — PROGRESS NOTES
Pt resting in room. Morning vitals WNL. Ambulating with CG assistance. Pain slightly elevated, pain meds given. Discharging today. Will continue to monitor.     Vitals:  /68  HR 72  O2 97 % ra  Temp 97.8  RR 17  Pain 6/10

## 2022-11-25 NOTE — PROGRESS NOTES
[facility] Discharge Assessment    Transportation  \"Has lack of transportation kept you from medical appointments, meetings, work, or from getting things needed for daily living? \"Check all that apply:  [] A.  Yes, it has kept me from medical appointments or from getting my medications  [] B.  Yes, it has kept me from non-medical meetings, appointments, work, or from getting things that I need  [x] C.  No  [] X. Patient unable to respond  [] Y. Patient declines to respond    Provision of Current Reconciled Medication List to Subsequent Provider at Discharge  [] No, current reconciled medication list not provided to the subsequent provider. [x] Yes, current reconciled medication list provided to the subsequent provider. (**Select route of transmission below**)   [x] Via Electronic Health Record   [] [employer]  [] Verbal (e.g. in person, telephone, video conferencing)  [] Paper-based (e.g. fax, copies, printouts)   [] Other Methods (e.g. texting, email, CDs)    Provision of Current Reconciled Medication List to Patient at Discharge  [] No, current reconciled medication list not provided to the patient, family and/or caregiver. [x] Yes, current reconciled medication list provided to the patient, family and/or caregiver.  (**Select route of transmission below**)   [x] Via Electronic Health Record (e.g., electronic access to patient portal)   [] Via [employer]  [x] Verbal (e.g. in person, telephone, video conferencing)  [x] Paper-based (e.g. fax, copies, printouts)   [] Other Methods (e.g. texting, email, CDs)    Health Literacy  \"How often do you need to have someone help you when you read instructions, pamphlets, or other written material from your doctor or pharmacy? \"  []  0. Never  []  1. Rarely  [x]  2. Sometimes  []  3. Often  []  4. Always  []  8.   Patient unable to respond    BIMS - **Must be completed in the flowsheet at discharge prior to proceeding with Delirium Assessment**  [x] BIMS completed in flowsheet at discharge    Signs and Symptoms of Delirium  A. Acute Onset Mental Status Change - Is there evidence of an acute change in mental status from the patient's baseline? [x] 0. No  [] 1. Yes    B. Inattention - Did the patient have difficulty focusing attention, for example being easily distractible or having difficulty keeping track of what was being said? [x]  0. Behavior not present  []  1. Behavior continuously present, does not fluctuate  []  2. Behavior present, fluctuates (comes and goes, changes in severity)    C. Disorganized thinking - Was the patient's thinking disorganized or incoherent (rambling or irrelevant conversation, unclear or illogical flow of ideas, or unpredictable switching from subject to subject)? [x]  0. Behavior not present  []  1. Behavior continuously present, does not fluctuate  []  2. Behavior present, fluctuates (comes and goes, changes in severity)    D. Altered level of consciousness - Did the patient have altered level of consciousness as indicated by any of the following criteria? Vigilant - startled easily to any sound or touch  Lethargic - repeatedly dozed off while being asked questions, but responded to voice or touch  Stuporous - very difficulty to arouse and keep aroused for the interview  Comatose - could not be aroused  [x]  0. Behavior not present  []  1. Behavior continuously present, does not fluctuate  []  2. Behavior present, fluctuates (comes and goes, changes in severity)    Mood    \"Over the last 2 weeks, have you been bothered by any of the following problems?\" 1. Symptom Presence    0 = No  1 = Yes  9 = No Response 2.  Symptom Frequency    0 = Never or 1 day  1 = 2-6 days (several days)  2 = 7-11 days (half or more of the days)  3 = 12-14 days (nearly every day)  **Leave blank if 'No Reponse'**      Enter scores in boxes    Column 1 Column 2   Little interest or pleasure in doing things   1 1   Feeling down, depressed, or hopeless   1 1   **If either A or B in column 2 is coded 2 or 3, CONTINUE asking the questions below. If not, END the interview. **     Trouble falling or staying asleep, or sleeping too much       Feeling tired or having little energy       Poor appetite or overeating       Feeling bad about yourself - or that you are a failure or have let yourself or your family down       Trouble concentrating on things, such as reading the newspaper or watching television       Moving or speaking so slowly that other people could have noticed. Or the opposite- being so fidgety or restless that you have been moving around a lot more than usual.       Thoughts that you would be better off dead, or of hurting yourself in some way. Total Severity: Add scores for all frequency responses in column 2 (possible score 0-27, or enter 99 if unable to complete (if symptom frequency (column 2) is blank for 3 or more items). Social Isolation  \"How often do you feel lonely or isolated from those around you? \"  [] 0. Never  [x] 1. Rarely  [] 2. Sometimes  [] 3. Often  [] 4. Always  [] 7. Patient declines to respond  [] 8. Patient unable to respond    Pain Effect on Sleep  \"Over the past 5 days, how much of the time has pain made it hard for you to sleep at night? \"  []  0. Does not apply - I have not had any pain or hurting in the past 5 days  []  1. Rarely or not at all  []  2. Occasionally  []  3. Frequently  [x]  4. Almost constantly  []  8. Unable to answer    **If the patient answers \"0. Does not apply\" to this question, skip the next two \"Pain Effect. Edna Saha \" questions**    Pain Interference with Therapy Activities  \"Over the past 5 days, how often have you limited your participation in rehabilitation therapy sessions due to pain? \"  []  0. Does not apply - I have not received rehabilitation therapy in the past 5 days  []  1. Rarely or not at all  [x]  2. Occasionally  []  3. Frequently  []  4. Almost constantly  []  8. Unable to answer    Pain Interference with Day-to-Day Activities: \"Over the past 5 days, how often have you limited your day-to-day activities (excluding rehabilitation therapy session)? \"  []  1. Rarely or not at all  []  2. Occasionally  [x]  3. Frequently  []  4. Almost constantly  []  8. Unable to answer    Nutritional Approaches  Last 7 Days - Check all of the following nutritional approaches that were received within the last 7 days:  []  A. Parenteral/IV feeding  []  B. Feeding tube (e.g., nasogastric or abdominal (PEG))  []  C. Mechanically altered diet - requires change in texture of food or liquids (e.g., pureed food, thickened liquids)  []  D. Therapeutic diet (e.g., low salt, diabetic, low cholesterol)  [x]  Z. None of the above    At time of Discharge - Check all of the following nutritional approaches that were being received at discharge:  []  A. Parenteral/IV feeding (including IV fluids if needed for hydration, but not as part of dialysis/chemo)  []  B. Feeding tube (e.g., nasogastric or abdominal (PEG))  []  C. Mechanically altered diet - requires change in texture of food or liquids (e.g., pureed food, thickened liquids)  []  D. Therapeutic diet (e.g., low salt, diabetic, low cholesterol  [x]  Z. None of the above    High Risk Drug Classes:  Use and Indication    Is taking: Check if the pt is taking any medications by pharmacological classification, not how it is used, in the following classes  Indication noted:  If column 1 is checked, check if there is an indication noted for all meds in the drug class Is taking  (check all that apply) Indication noted (check all that apply)   Antipsychotic [] []   Anticoagulant [] []   Antibiotic [] []   Opioid [x] [x]   Antiplatelet [] []   Hypoglycemic (including insulin) [x] [x]   None of the above []     Special Treatments, Procedures, and Programs    Check all of the following treatments, procedures, and programs that apply at discharge. At Discharge (check all that apply)   Cancer Treatments   A1. Chemotherapy []           A2. IV []           A3. Oral []           A10. Other []   B1. Radiation []   Respiratory Therapies   C1. Oxygen Therapy []           C2. Continuous (continuously for at least 14 hours per day) []           C3. Intermittent []           C4. High-concentration []   D1. Suctioning (Does not include oral suctioning) []           D2. Scheduled []           D3. As needed []   E1. Tracheostomy Care []   F1. Invasive Mechanical Ventilator (ventilator or respirator) []   G1. Non-invasive Mechanical Ventilator []           G2. BiPAP []           G3. CPAP [x]   Other   H1. IV Medications (Do not include sub Q pumps, flushes, Dextrose 50% or lactated ringers) []           H2. Vasoactive medications []           H3. Antibiotics []           H4. Anticoagulation []           H10. Other []   I1. Transfusions []   J1. Dialysis []           J2. Hemodialysis []           J3. Peritoneal dialysis []   O1. IV access (including a catheter in a vein) []           O2. Peripheral []           O3. Midline []           O4.  Central (PICC, tunneled, port) []      None of the above (select if no Cancer, Respiratory, or Other boxes are checked) []

## 2022-11-25 NOTE — PLAN OF CARE
Problem: Discharge Planning  Goal: Discharge to home or other facility with appropriate resources  Outcome: Progressing  Flowsheets (Taken 11/24/2022 2137 by Melva Kline, RN)  Discharge to home or other facility with appropriate resources: Identify barriers to discharge with patient and caregiver     Problem: Pain  Goal: Verbalizes/displays adequate comfort level or baseline comfort level  Outcome: Progressing  Flowsheets (Taken 11/24/2022 2137 by Melva Kline RN)  Verbalizes/displays adequate comfort level or baseline comfort level: Encourage patient to monitor pain and request assistance     Problem: ABCDS Injury Assessment  Goal: Absence of physical injury  Outcome: Progressing  Flowsheets (Taken 11/24/2022 2137 by Melva Kline RN)  Absence of Physical Injury: Implement safety measures based on patient assessment     Problem: Safety - Adult  Goal: Free from fall injury  Outcome: Progressing  Flowsheets (Taken 11/24/2022 2137 by Melva Kline RN)  Free From Fall Injury: Instruct family/caregiver on patient safety     Problem: Nutrition Deficit:  Goal: Optimize nutritional status  Outcome: Progressing     Problem: Skin/Tissue Integrity  Goal: Absence of new skin breakdown  Description: 1. Monitor for areas of redness and/or skin breakdown  2. Assess vascular access sites hourly  3. Every 4-6 hours minimum:  Change oxygen saturation probe site  4. Every 4-6 hours:  If on nasal continuous positive airway pressure, respiratory therapy assess nares and determine need for appliance change or resting period.   Outcome: Progressing

## 2022-11-25 NOTE — DISCHARGE SUMMARY
Physical Medicine & Rehabilitation  Discharge Summary     Patient Identification:  Tashi Tavera  : 1955  Admit date: 2022  Discharge date:  22  Attending provider: Marija Mejia DO        Primary care provider: JOSE GUADALUPE LUNDBERG     Discharge Diagnoses:   Patient Active Problem List   Diagnosis    Lumbar stenosis with neurogenic claudication    Status post lumbar spine operative procedure for decompression of spinal cord    Debility       History of Present Illness/Acute Hospital Course: Tashi Tavera is a 61 y.o. male, past medical history of type 2 diabetes, hypertension, hyperlipidemia who has been having chronic thoracic and lumbar pain with lower extremity weakness. He had an MRI done which demonstrated epidural lipomatosis severe spinal stenosis and cord compression from the T5 T6-T9 levels has severe central stenosis at L3-L4 and L4-L5 with moderate to severe stenosis at L2-L3. This likely secondary to a combination of epidural lipomatosis and facet arthropathy. Patient has been seen neurosurgery and had failed outpatient conservative management. He underwent T5-T9 decompression with resection of epidural lipomatosis with L2-L5 decompression with resection of epidural lipomatosis on 11/15. Inpatient Rehabilitation Course: Tashi Tavera is a 61 y.o. male admitted to inpatient rehabilitation on 2022 with Debility. The patient participated in an aggressive multidisciplinary inpatient rehabilitation program involving 3 hours of therapy per day, at least 5 days per week.      Impairments: Decreased functional mobility     Medical Management:  Severe central stenosis at T5-T9 with Epidural Lipomatosis/lipoma  Severe central stenosis at L2-3-4-5 with Epidural Lipomatosis/lipoma  Status post T5-T9--L2-5, laminectomy, medial facetectomy for decompression and foraminotomies,Resection of Epidural Lipoma/lipomatosis  11/15/22              -Neurosurgery following -Continue PT/OT- ARU setting               -Pain management: Oxycodone and lidocaine patch              -robaxin              -Postop incision care             - voltaren gel prn      Type 2 diabetes  -Continue with insulin pump  -Management per primary team     Essential hypertension              -Continue metoprolol, losartan/HCTZ     Hyperlipidemia  -continue rosuvastatin, ezetemibe     GERD  -continue PPI     History of TIAs  -continue statin. [employer] -ASA on hold       Discharge Exam:  Constitutional: Alert, WDWN, Pleasant, no distress  Head: Normocephalic, atruamatic, MMM  Eyes: Conjunctiva noninjected, no icterus, no drainage  Pulm: CTA bilat. Respirations non-labored. CV: No murmurs noted. RRR. Abd: Soft, nontender.  NABS+  Ext: No edema, no varicosities  Neuro: Alert, fully oriented, appropriate   MSK: No joint abnormalities noted       Discharge Functional Status:    Physical therapy:  Bed Mobility: Scooting: Stand by assistance  Transfers: Sit to Stand: Contact guard assistance (initially with RW progressing to no AD for the rest of session from EOB, recliner, wc, and chair with arms)  Stand to Sit: Contact guard assistance  Bed to Chair: Contact guard assistance (without AD from EOB to chair), Ambulation  Surface: Level tile  Device: No Device, Rolling Walker  Assistance: Contact guard assistance  Quality of Gait: slow christine, steady gait - no loss of balance, forward flexed posture- Max VC for upright posture, decreased (B) step length and foot clearance  Gait Deviations: Decreased step length, Decreased step height, Slow Christine  Distance: 15'x2 + 250' + 100'  Comments: initial ambulation to/from bathroom performed with RW, rest of ambulation performed without AD. pt steady without AD  More Ambulation?: Yes, Stairs  # Steps : 12  Stairs Height: 6\"  Rails: Right ascending  Assistance: Contact guard assistance  Comment: non-reciprocal pattern for ascending and descending  Mobility:  , PT Equipment Recommendations  Equipment Needed: No  Other: continue to assess- may benefit from RW, Assessment: pt is a 60 yo male s/p spinal surgery from home with wife typically very IND and active prior to admission without AD. pt presenting below baseline function requiring SBA for all bed mobility and CGA for all transfers and ambulation tasks without AD including stairs. pt continues to be limited mostly by pain but also demonstrates weakness in BLEs and BUEs with decreased activity tolerance requiring frequent rest breaks. pt demonstrates poor safety awareness, can be impulsive, and requires cues to follow spinal precautions. pt will benefit from continued skilled PT to maximize independence and promote a safe dc home. Occupational therapy:  ,  , Assessment: Pt is a 60 y/o M who presents s/p T5-T9 DECOMPRESSION WITH RESECTION OF EPIDURAL LIPOMATOSIS WITH L2-L5 DECOMPRESSION WITH RESECTION OF EPIDURAL LIPOMATOSIS. Pt is from home w/ wife and was independent w/ all ADLs/IADLs, worked part-time, and ambulated w/o AD pta. Pt currently requires SBA-CGA for functional transfers, CGA for functional mobility w/o AD, and increased assist to complete all ADLs d/t pain, decreased balance, and generalized weakness. Pt is motivated to return home at dc and would benefit from ongoing inpt OT services to maximize safety and functional independence prior to dc.  Cont OT per POC    Speech therapy:         Significant Diagnostics:   Lab Results   Component Value Date    CREATININE 0.9 11/25/2022    BUN 14 11/25/2022     (L) 11/25/2022    K 3.4 (L) 11/25/2022    CL 91 (L) 11/25/2022    CO2 32 11/25/2022       Lab Results   Component Value Date    WBC 3.5 (L) 11/25/2022    HGB 11.4 (L) 11/25/2022    HCT 32.6 (L) 11/25/2022    MCV 92.1 11/25/2022     11/25/2022       Disposition:  home    Services:PT/OT  DME: walker     Discharge Condition: Stable    Follow-up:  See after visit summary from hospitalization    Discharge Medications:     Medication List        START taking these medications      diclofenac sodium 1 % Gel  Commonly known as: VOLTAREN  Apply 4 g topically 2 times daily     lidocaine 4 % external patch  Place 2 patches onto the skin daily     methocarbamol 750 MG tablet  Commonly known as: ROBAXIN  Take 2 tablets by mouth in the morning and 2 tablets at noon and 2 tablets in the evening and 2 tablets before bedtime. Do all this for 10 days. metoprolol succinate 100 MG extended release tablet  Commonly known as: TOPROL XL  Take 1 tablet by mouth daily  Replaces: bisoprolol 10 MG tablet     mupirocin 2 % ointment  Commonly known as: BACTROBAN  Apply topically 3 times daily. naloxegol 25 MG Tabs tablet  Commonly known as: MOVANTIK  Take 1 tablet by mouth every morning (before breakfast)     omeprazole 20 MG delayed release capsule  Commonly known as: PRILOSEC     oxyCODONE 5 MG immediate release tablet  Commonly known as: ROXICODONE  Take 1 tablet by mouth every 4 hours as needed for Pain for up to 3 days.      polyethylene glycol 17 g packet  Commonly known as: GLYCOLAX  Take 17 g by mouth daily as needed for Constipation     sennosides-docusate sodium 8.6-50 MG tablet  Commonly known as: SENOKOT-S  Take 1 tablet by mouth 2 times daily     sucralfate 1 GM tablet  Commonly known as: CARAFATE  Take 1 tablet by mouth every 12 hours     tamsulosin 0.4 MG capsule  Commonly known as: FLOMAX  Take 1 capsule by mouth daily            CONTINUE taking these medications      citalopram 40 MG tablet  Commonly known as: CELEXA     Dexcom G6 Sensor Misc     insulin lispro 100 UNIT/ML injection vial  Commonly known as: HUMALOG     losartan-hydroCHLOROthiazide 100-25 MG per tablet  Commonly known as: HYZAAR  Take 1 tablet by mouth daily     rosuvastatin 10 MG tablet  Commonly known as: CRESTOR            STOP taking these medications      bisoprolol 10 MG tablet  Commonly known as: ZEBETA  Replaced by: metoprolol succinate 100 MG extended release tablet     ezetimibe 10 MG tablet  Commonly known as: ZETIA               Where to Get Your Medications        These medications were sent to [facility]/pharmacy #[id number] [place], [place] - [address]  [phone]  [address], [facility] [address]      Hours: 24-hours Phone: [phone]   diclofenac sodium 1 % Gel  lidocaine 4 % external patch  losartan-hydroCHLOROthiazide 100-25 MG per tablet  methocarbamol 750 MG tablet  metoprolol succinate 100 MG extended release tablet  mupirocin 2 % ointment  naloxegol 25 MG Tabs tablet  oxyCODONE 5 MG immediate release tablet  polyethylene glycol 17 g packet  sennosides-docusate sodium 8.6-50 MG tablet  sucralfate 1 GM tablet  tamsulosin 0.4 MG capsule           I spent over 35 minutes on this discharge encounter between counseling, coordination of care, and medication reconciliation. To comply with [facility] chrissy R.II.4.1:   Discharge order placed in advance to facilitate patients discharge needs.       Jorge Luis Romero, DO

## 2022-11-25 NOTE — PROGRESS NOTES
Patient discharged. Patient educated on fall precautions, last dose of medication, and next dose of medications.  Patient given AVS.

## 2022-11-25 NOTE — CARE COORDINATION
Case Management Assessment            Discharge Note                    Date / Time of Note: 11/25/2022 11:29 AM                  Discharge Note Completed by: CIARAN Kirkpatrick    Patient Name: Tashi Tavera   YOB: 1955  Diagnosis: Debility [R53.81]   Date / Time: 11/21/2022  4:21 PM    Current PCP: Payam Durán patient: No    Hospitalization in the last 30 days: Yes    Advance Directives:  Code Status: Full Code  [place] DNR form completed and on chart: Not Indicated    Financial:  Payor: Cristiane Ch / Plan: Palak Cook ESSENTIAL/PLUS / Product Type: *No Product type* /      Pharmacy:    [facility] [place] - [address] [place] [address] [address]  Phone: [phone] Fax: [phone]      Assistance purchasing medications?: Potential Assistance Purchasing Medications: No  Assistance provided by Case Management: None at this time    Does patient want to participate in local refill/ meds to beds program?:      Meds To Beds General Rules:  1. Can ONLY be done Monday- Friday between 8:30am-5pm  2. Prescription(s) must be in pharmacy by 3pm to be filled same day  3. Copy of patient's insurance/ prescription drug card and patient face sheet must be sent along with the prescription(s)  4. Cost of Rx cannot be added to hospital bill. If financial assistance is needed, please contact unit  or ;  or  CANNOT provide pharmacy voucher for patients co-pays  5.  Patients can then  the prescription on their way out of the hospital at discharge, or pharmacy can deliver to the bedside if staff is available. (payment due at time of pick-up or delivery - cash, check, or card accepted)     Able to afford home medications/ co-pay costs: Yes    ADLS:  Current PT AM-PAC Score:   /24  Current OT AM-PAC Score:   /24      DISCHARGE Disposition: Home with [facility] [address]: [employer]     LOC at discharge: Not Applicable  UMESH Completed: Not Indicated    Notification completed in HENS/PAS?:  Not Applicable    IMM Completed:   No    Transportation:  Transportation PLAN for discharge: family   Mode of Transport: Private Car  Reason for medical transport: Not Applicable  Name of Transport Company: Not Applicable  Time of Transport: n/a    Transport form completed: Not Indicated    Home Care:  [address] ordered at discharge: Yes  [address]: [email or web address]  Phone: [phone]  Fax: [phone]  Orders faxed: Yes [facility]ksandar Goff from [facility] faxed    Durable Medical Equipment:  DME Provider: none  Equipment obtained during hospitalization: none    Referrals made at [facility] for outpatient continued care:  Not Applicable    Additional CM Notes: Pt is from home with his wife who is an RN. Pt is returning home at discharge. Skilled Home Care services have been arranged through [email or web address]. Pt has transportation to get home at discharge. Pt has no DME or other CM needs at discharge. The Plan for Transition of Care is related to the following treatment goals of Debility [R53.81]    The Patient and/or patient representative Javan Yee and his family were provided with a choice of provider and agrees with the discharge plan Yes    Freedom of choice list was provided with basic dialogue that supports the patient's individualized plan of care/goals and shares the quality data associated with the providers.  Yes    Care Transitions patient: No    CIARAN Gonzalez  [facility] [employer]  Case Management Department  Ph: [phone]  Fax: [phone]

## 2022-11-25 NOTE — PROGRESS NOTES
Pt A & O. VSS. Pt x 1 assist. Pt non compliant with safety precautions. C/o surgical pain managed with PRN Pain medication. Will cont to monitor.

## 2024-12-19 RX ORDER — LOSARTAN POTASSIUM AND HYDROCHLOROTHIAZIDE 25; 100 MG/1; MG/1
TABLET ORAL
Qty: 30 TABLET | Refills: 3 | OUTPATIENT
Start: 2024-12-19

## (undated) DEVICE — TOOL 9BA75 LEGEND 9CM 7..5MM BA: Brand: MIDAS REX

## (undated) DEVICE — SYSTEM SKIN CLSR 22CM DERMBND PRINEO

## (undated) DEVICE — BLANKET WRM W29.9XL79.1IN UP BODY FORC AIR MISTRAL-AIR

## (undated) DEVICE — SUTURE MCRYL SZ 4-0 L27IN ABSRB UD L19MM PS-2 1/2 CIR PRIM Y426H

## (undated) DEVICE — ELECTRODE NERVE STIM FOR SPNL CRD MONITORING

## (undated) DEVICE — GLOVE SURG SZ 85 L12IN FNGR THK87MIL DK GRN LTX FREE ISOLEX

## (undated) DEVICE — GLOVE SURG SZ 7 CRM LTX FREE POLYISOPRENE POLYMER BEAD ANTI

## (undated) DEVICE — SYSTEM SKIN CLOSURE 42CM DERMABOND PRINEO

## (undated) DEVICE — COVER LT HNDL CAM BLU DISP W/ SURG CTRL

## (undated) DEVICE — SPONGE,NEURO,0.5"X3",XR,STRL,LF,10/PK: Brand: MEDLINE

## (undated) DEVICE — SUTURE VCRL SZ 2-0 L18IN ABSRB UD CT-1 L36MM 1/2 CIR J839D

## (undated) DEVICE — ADHESIVE SKIN CLSR 0.7ML TOP DERMBND ADV

## (undated) DEVICE — SUTURE 1 STRATAFIX SYMMETRIC PDS + 30CM CT-1 SXPP1A435

## (undated) DEVICE — LAMINECTOMY: Brand: MEDLINE INDUSTRIES, INC.

## (undated) DEVICE — DRAPE MICSCP W54XL150IN W/ 4 BINOC GLS LENS LEICA

## (undated) DEVICE — DRAIN SURG 15FR SIL RND CHN W/ TRCR FULL FLUT DBL WRP TRAD

## (undated) DEVICE — TOWEL,STOP FLAG GOLD N-W: Brand: MEDLINE

## (undated) DEVICE — BASIC SINGLE BASIN 1-LF: Brand: MEDLINE INDUSTRIES, INC.

## (undated) DEVICE — SUTURE VCRL SZ 0 L18IN ABSRB UD L36MM CT-1 1/2 CIR J840D

## (undated) DEVICE — DRESSING FOAM DISK DIA1IN H 7MM HYDRPHLC CHG IMPREG IN SL

## (undated) DEVICE — PAD,NON-ADHERENT,3X8,STERILE,LF,1/PK: Brand: MEDLINE

## (undated) DEVICE — GLOVE SURG SZ 85 L12IN FNGR THK79MIL GRN LTX FREE

## (undated) DEVICE — KIT POS PT CARE KT W/ GENTLE TCH WILSON +

## (undated) DEVICE — SYRINGE MED 30ML STD CLR PLAS LUERLOCK TIP N CTRL DISP

## (undated) DEVICE — GAUZE,SPONGE,4"X4",16PLY,XRAY,STRL,LF: Brand: MEDLINE

## (undated) DEVICE — RESERVOIR,SUCTION,100CC,SILICONE: Brand: MEDLINE

## (undated) DEVICE — SOLUTION IV 1000ML 0.9% SOD CHL

## (undated) DEVICE — TOOL 14MH30 LEGEND 14CM 3MM: Brand: MIDAS REX ™

## (undated) DEVICE — GLOVE SURG SZ 7 L12IN FNGR THK79MIL GRN LTX FREE